# Patient Record
Sex: FEMALE | HISPANIC OR LATINO
[De-identification: names, ages, dates, MRNs, and addresses within clinical notes are randomized per-mention and may not be internally consistent; named-entity substitution may affect disease eponyms.]

---

## 2017-07-12 ENCOUNTER — APPOINTMENT (OUTPATIENT)
Dept: SPINE | Facility: CLINIC | Age: 22
End: 2017-07-12

## 2017-07-12 VITALS
HEIGHT: 60 IN | BODY MASS INDEX: 27.48 KG/M2 | SYSTOLIC BLOOD PRESSURE: 102 MMHG | DIASTOLIC BLOOD PRESSURE: 68 MMHG | WEIGHT: 140 LBS | HEART RATE: 54 BPM

## 2017-07-12 DIAGNOSIS — Z78.9 OTHER SPECIFIED HEALTH STATUS: ICD-10-CM

## 2017-07-12 DIAGNOSIS — Z82.69 FAMILY HISTORY OF OTHER DISEASES OF THE MUSCULOSKELETAL SYSTEM AND CONNECTIVE TISSUE: ICD-10-CM

## 2017-07-12 RX ORDER — ASPIRIN 81 MG
81 TABLET, DELAYED RELEASE (ENTERIC COATED) ORAL
Refills: 0 | Status: DISCONTINUED | COMMUNITY

## 2019-05-28 ENCOUNTER — LABORATORY RESULT (OUTPATIENT)
Age: 24
End: 2019-05-28

## 2019-05-28 ENCOUNTER — APPOINTMENT (OUTPATIENT)
Dept: NEUROLOGY | Facility: CLINIC | Age: 24
End: 2019-05-28
Payer: MEDICAID

## 2019-05-28 VITALS
SYSTOLIC BLOOD PRESSURE: 112 MMHG | HEART RATE: 92 BPM | DIASTOLIC BLOOD PRESSURE: 62 MMHG | HEIGHT: 60 IN | WEIGHT: 162 LBS | BODY MASS INDEX: 31.8 KG/M2 | TEMPERATURE: 98.4 F | OXYGEN SATURATION: 98 %

## 2019-05-28 DIAGNOSIS — Z78.9 OTHER SPECIFIED HEALTH STATUS: ICD-10-CM

## 2019-05-28 PROCEDURE — 99205 OFFICE O/P NEW HI 60 MIN: CPT

## 2019-05-28 NOTE — ASSESSMENT
[FreeTextEntry1] : Patient's symptoms of right eyelid twitching are likely related to anxiety.  However given patient's age, will evaluate for multiple sclerosis and epileptic events. Will obtain MRI of the brain with and without contrast as well as an EEG. Will also obtain thyroid functions tests.

## 2019-05-28 NOTE — REVIEW OF SYSTEMS
[As Noted in HPI] : as noted in HPI [Anxiety] : anxiety [Depression] : depression [Fever] : no fever [Eyesight Problems] : no eyesight problems [Loss Of Hearing] : no hearing loss [Chest Pain] : no chest pain [Shortness Of Breath] : no shortness of breath [Vomiting] : no vomiting [Incontinence] : no incontinence [Joint Pain] : no joint pain [Itching] : no itching [Muscle Weakness] : no muscle weakness [Easy Bleeding] : no tendency for easy bleeding

## 2019-05-28 NOTE — PHYSICAL EXAM
[General Appearance - Alert] : alert [General Appearance - In No Acute Distress] : in no acute distress [Person] : oriented to person [Place] : oriented to place [Time] : oriented to time [Registration Intact] : recent registration memory intact [Concentration Intact] : normal concentrating ability [Visual Intact] : visual attention was ~T not ~L decreased [Naming Objects] : no difficulty naming common objects [Repeating Phrases] : no difficulty repeating a phrase [Fluency] : fluency intact [Comprehension] : comprehension intact [Vocabulary] : adequate range of vocabulary [Cranial Nerves Optic (II)] : visual acuity intact bilaterally,  visual fields full to confrontation, pupils equal round and reactive to light [Cranial Nerves Oculomotor (III)] : extraocular motion intact [Cranial Nerves Facial (VII)] : face symmetrical [Cranial Nerves Trigeminal (V)] : facial sensation intact symmetrically [Cranial Nerves Vestibulocochlear (VIII)] : hearing was intact bilaterally [Cranial Nerves Glossopharyngeal (IX)] : tongue and palate midline [Cranial Nerves Accessory (XI - Cranial And Spinal)] : head turning and shoulder shrug symmetric [Cranial Nerves Hypoglossal (XII)] : there was no tongue deviation with protrusion [Motor Tone] : muscle tone was normal in all four extremities [Motor Strength] : muscle strength was normal in all four extremities [Involuntary Movements] : no involuntary movements were seen [Sensation Tactile Decrease] : light touch was intact [Abnormal Walk] : normal gait [Balance] : balance was intact [1+] : Ankle jerk left 1+ [Full Pulse] : the pedal pulses are present [Coordination - Dysmetria Impaired Finger-to-Nose Bilateral] : not present [Plantar Reflex Right Only] : normal on the right [Coordination - Dysmetria Impaired Heel-to-Shin Bilateral] : not present [Plantar Reflex Left Only] : normal on the left [FreeTextEntry5] : fundi sharp

## 2019-05-28 NOTE — DATA REVIEWED
[de-identified] : Neurosurgery consult appreciated\par MRI of the cervical spine shows C5-C6 disc bulge, inferior cerebellar tonsillar displacement measuring 1-2 mm suggestive of borderline tonsillar ectopia without evidence of syringohydromyelia

## 2019-05-28 NOTE — CONSULT LETTER
[Dear  ___] : Dear  [unfilled], [Consult Letter:] : I had the pleasure of evaluating your patient, [unfilled]. [Please see my note below.] : Please see my note below. [Sincerely,] : Sincerely, [Consult Closing:] : Thank you very much for allowing me to participate in the care of this patient.  If you have any questions, please do not hesitate to contact me. [FreeTextEntry3] : Patrica Baltazar MD, MPH\par

## 2019-05-28 NOTE — HISTORY OF PRESENT ILLNESS
[FreeTextEntry1] : Patient is here for evaluation of facial twitching started end of April-beginning of May 2019. It involve the right lower eyelid.  The twitching persisted for a few weeks, then resolve. The patient denies loss of vision or double vision. She had dizziness at that time. No vertigo. She did not having difficulty speaking or with language. She has occasional episodes of difficulty with swallowing, chronic. There was no focal weakness or numbness. The patient has just had difficulty with balance with falling towards the left side. The symptoms resolved. She had anxiety and depression at that time, which has improved.\par \par The patient has been born and lived in [place].  There is no family history of brain tumors, epilepsy or multiple sclerosis.

## 2019-05-30 ENCOUNTER — TRANSCRIPTION ENCOUNTER (OUTPATIENT)
Age: 24
End: 2019-05-30

## 2019-05-30 LAB
ANION GAP SERPL CALC-SCNC: 13 MMOL/L
BUN SERPL-MCNC: 15 MG/DL
CALCIUM SERPL-MCNC: 9.1 MG/DL
CHLORIDE SERPL-SCNC: 102 MMOL/L
CO2 SERPL-SCNC: 25 MMOL/L
CREAT SERPL-MCNC: 0.75 MG/DL
GLUCOSE SERPL-MCNC: 97 MG/DL
HCG UR QL: NEGATIVE
POTASSIUM SERPL-SCNC: 3.8 MMOL/L
SODIUM SERPL-SCNC: 140 MMOL/L
TSH SERPL-ACNC: 14.8 UIU/ML

## 2019-06-03 ENCOUNTER — APPOINTMENT (OUTPATIENT)
Dept: MRI IMAGING | Facility: HOSPITAL | Age: 24
End: 2019-06-03
Payer: MEDICAID

## 2019-06-03 ENCOUNTER — OUTPATIENT (OUTPATIENT)
Dept: OUTPATIENT SERVICES | Facility: HOSPITAL | Age: 24
LOS: 1 days | End: 2019-06-03
Payer: MEDICAID

## 2019-06-03 DIAGNOSIS — R25.3 FASCICULATION: ICD-10-CM

## 2019-06-03 PROCEDURE — 70553 MRI BRAIN STEM W/O & W/DYE: CPT

## 2019-06-03 PROCEDURE — 70553 MRI BRAIN STEM W/O & W/DYE: CPT | Mod: 26

## 2019-08-01 ENCOUNTER — APPOINTMENT (OUTPATIENT)
Dept: NEUROLOGY | Facility: CLINIC | Age: 24
End: 2019-08-01
Payer: MEDICAID

## 2019-08-01 ENCOUNTER — RESULT REVIEW (OUTPATIENT)
Age: 24
End: 2019-08-01

## 2019-08-01 VITALS
OXYGEN SATURATION: 98 % | BODY MASS INDEX: 31.8 KG/M2 | HEART RATE: 81 BPM | SYSTOLIC BLOOD PRESSURE: 108 MMHG | WEIGHT: 162 LBS | DIASTOLIC BLOOD PRESSURE: 68 MMHG | HEIGHT: 60 IN

## 2019-08-01 PROCEDURE — 99213 OFFICE O/P EST LOW 20 MIN: CPT

## 2019-08-01 NOTE — HISTORY OF PRESENT ILLNESS
[FreeTextEntry1] : Patient is here for evaluation of facial twitching started end of April-beginning of May 2019. It involve the right lower eyelid. The twitching persisted for a few weeks, then resolve. The patient denies loss of vision or double vision. She had dizziness at that time. No vertigo. She did not having difficulty speaking or with language. She has occasional episodes of difficulty with swallowing, chronic. There was no focal weakness or numbness. The patient has just had difficulty with balance with falling towards the left side. The symptoms resolved. She had anxiety and depression at that time, which has improved.\par \par The patient has been born and lived in [place]. There is no family history of brain tumors, epilepsy or multiple sclerosis. \par \par The twitching has resolved and has not reoccured.\par

## 2019-08-01 NOTE — PHYSICAL EXAM
[General Appearance - Alert] : alert [General Appearance - In No Acute Distress] : in no acute distress [Person] : oriented to person [Place] : oriented to place [Time] : oriented to time [Registration Intact] : recent registration memory intact [Concentration Intact] : normal concentrating ability [Visual Intact] : visual attention was ~T not ~L decreased [Naming Objects] : no difficulty naming common objects [Repeating Phrases] : no difficulty repeating a phrase [Fluency] : fluency intact [Comprehension] : comprehension intact [Vocabulary] : adequate range of vocabulary [Cranial Nerves Oculomotor (III)] : extraocular motion intact [Cranial Nerves Optic (II)] : visual acuity intact bilaterally,  visual fields full to confrontation, pupils equal round and reactive to light [Cranial Nerves Trigeminal (V)] : facial sensation intact symmetrically [Cranial Nerves Facial (VII)] : face symmetrical [Motor Tone] : muscle tone was normal in all four extremities [Sensation Tactile Decrease] : light touch was intact [Motor Strength] : muscle strength was normal in all four extremities [Abnormal Walk] : normal gait [Balance] : balance was intact

## 2019-08-01 NOTE — ASSESSMENT
[FreeTextEntry1] : Patient's symptoms of right eyelid twitching are likely related to anxiety. TSh is high, patient will fu with PMD/endo for that.  Will get EEG to evaluate for epileptic events. Patient will call after her eeg.

## 2019-08-02 ENCOUNTER — NON-APPOINTMENT (OUTPATIENT)
Age: 24
End: 2019-08-02

## 2019-08-02 ENCOUNTER — APPOINTMENT (OUTPATIENT)
Dept: OBGYN | Facility: CLINIC | Age: 24
End: 2019-08-02
Payer: MEDICAID

## 2019-08-02 ENCOUNTER — OUTPATIENT (OUTPATIENT)
Dept: OUTPATIENT SERVICES | Facility: HOSPITAL | Age: 24
LOS: 1 days | End: 2019-08-02
Payer: MEDICAID

## 2019-08-02 VITALS
WEIGHT: 163 LBS | DIASTOLIC BLOOD PRESSURE: 66 MMHG | BODY MASS INDEX: 32 KG/M2 | SYSTOLIC BLOOD PRESSURE: 92 MMHG | HEIGHT: 60 IN

## 2019-08-02 DIAGNOSIS — Z34.00 ENCOUNTER FOR SUPERVISION OF NORMAL FIRST PREGNANCY, UNSPECIFIED TRIMESTER: ICD-10-CM

## 2019-08-02 LAB
APPEARANCE UR: CLEAR — SIGNIFICANT CHANGE UP
BASOPHILS # BLD AUTO: 0.02 K/UL — SIGNIFICANT CHANGE UP (ref 0–0.2)
BASOPHILS NFR BLD AUTO: 0.4 % — SIGNIFICANT CHANGE UP (ref 0–2)
BILIRUB UR-MCNC: NEGATIVE — SIGNIFICANT CHANGE UP
COLOR SPEC: YELLOW — SIGNIFICANT CHANGE UP
DIFF PNL FLD: SIGNIFICANT CHANGE UP
EOSINOPHIL # BLD AUTO: 0.1 K/UL — SIGNIFICANT CHANGE UP (ref 0–0.5)
EOSINOPHIL NFR BLD AUTO: 1.8 % — SIGNIFICANT CHANGE UP (ref 0–6)
GLUCOSE UR QL: NEGATIVE — SIGNIFICANT CHANGE UP
HCT VFR BLD CALC: 36.2 % — SIGNIFICANT CHANGE UP (ref 34.5–45)
HGB BLD-MCNC: 11.1 G/DL — LOW (ref 11.5–15.5)
IMM GRANULOCYTES NFR BLD AUTO: 0.2 % — SIGNIFICANT CHANGE UP (ref 0–1.5)
KETONES UR-MCNC: NEGATIVE — SIGNIFICANT CHANGE UP
LEUKOCYTE ESTERASE UR-ACNC: NEGATIVE — SIGNIFICANT CHANGE UP
LYMPHOCYTES # BLD AUTO: 1.99 K/UL — SIGNIFICANT CHANGE UP (ref 1–3.3)
LYMPHOCYTES # BLD AUTO: 35.2 % — SIGNIFICANT CHANGE UP (ref 13–44)
MCHC RBC-ENTMCNC: 24.8 PG — LOW (ref 27–34)
MCHC RBC-ENTMCNC: 30.7 GM/DL — LOW (ref 32–36)
MCV RBC AUTO: 81 FL — SIGNIFICANT CHANGE UP (ref 80–100)
MONOCYTES # BLD AUTO: 0.32 K/UL — SIGNIFICANT CHANGE UP (ref 0–0.9)
MONOCYTES NFR BLD AUTO: 5.7 % — SIGNIFICANT CHANGE UP (ref 2–14)
NEUTROPHILS # BLD AUTO: 3.22 K/UL — SIGNIFICANT CHANGE UP (ref 1.8–7.4)
NEUTROPHILS NFR BLD AUTO: 56.7 % — SIGNIFICANT CHANGE UP (ref 43–77)
NITRITE UR-MCNC: NEGATIVE — SIGNIFICANT CHANGE UP
PH UR: 6 — SIGNIFICANT CHANGE UP (ref 5–8)
PLATELET # BLD AUTO: 396 K/UL — SIGNIFICANT CHANGE UP (ref 150–400)
PROT UR-MCNC: ABNORMAL
RBC # BLD: 4.47 M/UL — SIGNIFICANT CHANGE UP (ref 3.8–5.2)
RBC # FLD: 14.5 % — SIGNIFICANT CHANGE UP (ref 10.3–14.5)
SP GR SPEC: 1.03 — HIGH (ref 1.01–1.02)
T3FREE SERPL-MCNC: 3.09 PG/ML — SIGNIFICANT CHANGE UP (ref 1.8–4.6)
T4 FREE SERPL-MCNC: 1.1 NG/DL — SIGNIFICANT CHANGE UP (ref 0.9–1.8)
TSH SERPL-MCNC: 9.49 UIU/ML — HIGH (ref 0.27–4.2)
UROBILINOGEN FLD QL: SIGNIFICANT CHANGE UP
WBC # BLD: 5.66 K/UL — SIGNIFICANT CHANGE UP (ref 3.8–10.5)
WBC # FLD AUTO: 5.66 K/UL — SIGNIFICANT CHANGE UP (ref 3.8–10.5)

## 2019-08-02 PROCEDURE — G0463: CPT

## 2019-08-02 PROCEDURE — 87389 HIV-1 AG W/HIV-1&-2 AB AG IA: CPT

## 2019-08-02 PROCEDURE — 84439 ASSAY OF FREE THYROXINE: CPT

## 2019-08-02 PROCEDURE — 81025 URINE PREGNANCY TEST: CPT

## 2019-08-02 PROCEDURE — 36415 COLL VENOUS BLD VENIPUNCTURE: CPT

## 2019-08-02 PROCEDURE — 86901 BLOOD TYPING SEROLOGIC RH(D): CPT

## 2019-08-02 PROCEDURE — 86850 RBC ANTIBODY SCREEN: CPT

## 2019-08-02 PROCEDURE — 81003 URINALYSIS AUTO W/O SCOPE: CPT

## 2019-08-02 PROCEDURE — 84443 ASSAY THYROID STIM HORMONE: CPT

## 2019-08-02 PROCEDURE — 86765 RUBEOLA ANTIBODY: CPT

## 2019-08-02 PROCEDURE — 86480 TB TEST CELL IMMUN MEASURE: CPT

## 2019-08-02 PROCEDURE — 86803 HEPATITIS C AB TEST: CPT

## 2019-08-02 PROCEDURE — 83655 ASSAY OF LEAD: CPT

## 2019-08-02 PROCEDURE — 81220 CFTR GENE COM VARIANTS: CPT

## 2019-08-02 PROCEDURE — 87340 HEPATITIS B SURFACE AG IA: CPT

## 2019-08-02 PROCEDURE — 86780 TREPONEMA PALLIDUM: CPT

## 2019-08-02 PROCEDURE — 83036 HEMOGLOBIN GLYCOSYLATED A1C: CPT

## 2019-08-02 PROCEDURE — 81001 URINALYSIS AUTO W/SCOPE: CPT

## 2019-08-02 PROCEDURE — 83020 HEMOGLOBIN ELECTROPHORESIS: CPT | Mod: 26

## 2019-08-02 PROCEDURE — 86762 RUBELLA ANTIBODY: CPT

## 2019-08-02 PROCEDURE — 86900 BLOOD TYPING SEROLOGIC ABO: CPT

## 2019-08-02 PROCEDURE — 99203 OFFICE O/P NEW LOW 30 MIN: CPT | Mod: TH

## 2019-08-02 PROCEDURE — 86787 VARICELLA-ZOSTER ANTIBODY: CPT

## 2019-08-02 PROCEDURE — 87086 URINE CULTURE/COLONY COUNT: CPT

## 2019-08-02 PROCEDURE — 84481 FREE ASSAY (FT-3): CPT

## 2019-08-02 PROCEDURE — 86376 MICROSOMAL ANTIBODY EACH: CPT

## 2019-08-02 PROCEDURE — 83020 HEMOGLOBIN ELECTROPHORESIS: CPT

## 2019-08-03 LAB
HBA1C BLD-MCNC: 5.6 % — SIGNIFICANT CHANGE UP (ref 4–5.6)
HBV SURFACE AG SER-ACNC: SIGNIFICANT CHANGE UP
HCV AB S/CO SERPL IA: 0.12 S/CO — SIGNIFICANT CHANGE UP (ref 0–0.99)
HCV AB SERPL-IMP: SIGNIFICANT CHANGE UP
HIV 1+2 AB+HIV1 P24 AG SERPL QL IA: SIGNIFICANT CHANGE UP
MEV IGG SER-ACNC: >300 AU/ML — SIGNIFICANT CHANGE UP
MEV IGG+IGM SER-IMP: POSITIVE — SIGNIFICANT CHANGE UP
RUBV IGG SER-ACNC: 3.6 INDEX — SIGNIFICANT CHANGE UP
RUBV IGG SER-IMP: POSITIVE — SIGNIFICANT CHANGE UP
T PALLIDUM AB TITR SER: NEGATIVE — SIGNIFICANT CHANGE UP
THYROPEROXIDASE AB SERPL-ACNC: 640 IU/ML — HIGH (ref 0–34.9)
VZV IGG FLD QL IA: 1759 INDEX — SIGNIFICANT CHANGE UP
VZV IGG FLD QL IA: POSITIVE — SIGNIFICANT CHANGE UP

## 2019-08-04 LAB
CULTURE RESULTS: SIGNIFICANT CHANGE UP
SPECIMEN SOURCE: SIGNIFICANT CHANGE UP

## 2019-08-05 ENCOUNTER — RESULT CHARGE (OUTPATIENT)
Age: 24
End: 2019-08-05

## 2019-08-05 ENCOUNTER — OUTPATIENT (OUTPATIENT)
Dept: OUTPATIENT SERVICES | Facility: HOSPITAL | Age: 24
LOS: 1 days | End: 2019-08-05
Payer: MEDICAID

## 2019-08-05 ENCOUNTER — RESULT REVIEW (OUTPATIENT)
Age: 24
End: 2019-08-05

## 2019-08-05 DIAGNOSIS — Z34.91 ENCOUNTER FOR SUPERVISION OF NORMAL PREGNANCY, UNSPECIFIED, FIRST TRIMESTER: ICD-10-CM

## 2019-08-05 DIAGNOSIS — R25.3 FASCICULATION: ICD-10-CM

## 2019-08-05 DIAGNOSIS — Z3A.10 10 WEEKS GESTATION OF PREGNANCY: ICD-10-CM

## 2019-08-05 LAB
GAMMA INTERFERON BACKGROUND BLD IA-ACNC: 0.02 IU/ML — SIGNIFICANT CHANGE UP
LEAD BLD-MCNC: 1 UG/DL — SIGNIFICANT CHANGE UP (ref 0–4)
M TB IFN-G BLD-IMP: NEGATIVE — SIGNIFICANT CHANGE UP
M TB IFN-G CD4+ BCKGRND COR BLD-ACNC: 0 IU/ML — SIGNIFICANT CHANGE UP
M TB IFN-G CD4+CD8+ BCKGRND COR BLD-ACNC: 0.01 IU/ML — SIGNIFICANT CHANGE UP
QUANT TB PLUS MITOGEN MINUS NIL: 1.97 IU/ML — SIGNIFICANT CHANGE UP

## 2019-08-05 PROCEDURE — 95819 EEG AWAKE AND ASLEEP: CPT

## 2019-08-05 PROCEDURE — 95819 EEG AWAKE AND ASLEEP: CPT | Mod: 26

## 2019-08-05 PROCEDURE — 95957 EEG DIGITAL ANALYSIS: CPT

## 2019-08-05 NOTE — EEG REPORT - NS EEG TEXT BOX
[facility]  Comprehensive Epilepsy Center  Report of Routine EEG     [facility]: [address], [address] [place] [address], Phone: [phone]  [facility]: [address], [place] [address], Phone: [phone]  Office: [address] [facility], [facility] [address], [place] [address], Phone: [phone]    Patient Name: EDVIN JACOBSEN    Age: 24 year  : 1995  Patient ID: -, MRN #: [id number], Location: [facility] EEG Lab  Referring Physician: DR SHER  EEG #:     Study Date: 2019		    Technical Information:					  On Instrument: -  Placement and Labeling of Electrodes:  The EEG was performed utilizing 20 channels referential EEG connections (coronal over temporal over parasagittal montage) using all standard 10-20 electrode placements with EKG.  Recording was at a sampling rate of 256 samples per second per channel.  Harmeet and seizure detection algorithms were utilized.    History:  ROUTINE EEG PERFORMED   25 Y/O FEMALE  H/O : CHIRI ,FASCICULATION  P/W :  RIGHT EYE TWITCHING   PATIENT IS ALERT AND ORIENTED X 3  PATIENT IS RIGHT HANDED  PHOTIC STIMULATION PERFORMED  HYPERVENTILATION PERFORMED  HR 60-65  PDR 11        Medication	  No Data.	    Study Interpretation:    Findings: The background was continuous, spontaneously variable and reactive. During wakefulness, the posterior dominant rhythm consisted of symmetric, well-modulated 11 Hz activity, with amplitude to 30 uV, that attenuated to eye opening.  Low amplitude frontal beta was noted in wakefulness.    Background Slowing:  No generalized background slowing was present.    Focal Slowing:   None were present.    Sleep Background:  Drowsiness was characterized by fragmentation, attenuation, and slowing of the background activity.    Stage II sleep transients were not recorded.    Other Non-Epileptiform Findings:  None were present.    Interictal Epileptiform Activity:   None were present.    Events:  Clinical events: None recorded.  Seizures: None recorded.    Activation Procedures:   Hyperventilation was performed and did not elicit any abnormalities.    Photic stimulation was performed and did not elicit any abnormalities.      Artifacts:  Intermittent myogenic and movement artifacts were noted.    ECG:  The heart rate on single channel ECG was predominantly between 60 and 70 BPM.    EEG Summary/Classification:  Normal EEG in the awake and drowsy states.    EEG Impression/Clinical Correlate:    Normal EEG study.    No epileptic pattern or seizure seen.    No clinical events concerning for seizure were captured during this study.    A repeat study preceded by sleep deprivation may be considered to help capture the asleep state.      ________________________________________    Jesus Balderas MD  Attending Physician, [facility] [place]

## 2019-08-06 LAB
HEMOGLOBIN INTERPRETATION: SIGNIFICANT CHANGE UP
HGB A MFR BLD: 97.5 % — SIGNIFICANT CHANGE UP (ref 95.8–98)
HGB A2 MFR BLD: 2.5 % — SIGNIFICANT CHANGE UP (ref 2–3.2)

## 2019-08-09 LAB — CYSTIC FIBROSIS EXPANDED PANEL: SIGNIFICANT CHANGE UP

## 2019-08-19 ENCOUNTER — APPOINTMENT (OUTPATIENT)
Dept: ANTEPARTUM | Facility: CLINIC | Age: 24
End: 2019-08-19
Payer: MEDICAID

## 2019-08-19 ENCOUNTER — ASOB RESULT (OUTPATIENT)
Age: 24
End: 2019-08-19

## 2019-08-19 PROCEDURE — 76801 OB US < 14 WKS SINGLE FETUS: CPT

## 2019-08-19 PROCEDURE — 36416 COLLJ CAPILLARY BLOOD SPEC: CPT

## 2019-08-19 PROCEDURE — 76813 OB US NUCHAL MEAS 1 GEST: CPT

## 2019-09-06 ENCOUNTER — OUTPATIENT (OUTPATIENT)
Dept: OUTPATIENT SERVICES | Facility: HOSPITAL | Age: 24
LOS: 1 days | End: 2019-09-06
Payer: MEDICAID

## 2019-09-06 ENCOUNTER — APPOINTMENT (OUTPATIENT)
Dept: OBGYN | Facility: CLINIC | Age: 24
End: 2019-09-06
Payer: MEDICAID

## 2019-09-06 ENCOUNTER — NON-APPOINTMENT (OUTPATIENT)
Age: 24
End: 2019-09-06

## 2019-09-06 VITALS
BODY MASS INDEX: 31.41 KG/M2 | WEIGHT: 160 LBS | HEIGHT: 60 IN | DIASTOLIC BLOOD PRESSURE: 65 MMHG | SYSTOLIC BLOOD PRESSURE: 97 MMHG

## 2019-09-06 DIAGNOSIS — Z34.00 ENCOUNTER FOR SUPERVISION OF NORMAL FIRST PREGNANCY, UNSPECIFIED TRIMESTER: ICD-10-CM

## 2019-09-06 LAB
T4 FREE SERPL-MCNC: 1.2 NG/DL — SIGNIFICANT CHANGE UP (ref 0.9–1.8)
TSH SERPL-MCNC: 5.91 UIU/ML — HIGH (ref 0.27–4.2)

## 2019-09-06 PROCEDURE — 84439 ASSAY OF FREE THYROXINE: CPT

## 2019-09-06 PROCEDURE — G0463: CPT

## 2019-09-06 PROCEDURE — 81003 URINALYSIS AUTO W/O SCOPE: CPT

## 2019-09-06 PROCEDURE — 99213 OFFICE O/P EST LOW 20 MIN: CPT | Mod: TH

## 2019-09-06 PROCEDURE — 84443 ASSAY THYROID STIM HORMONE: CPT

## 2019-09-09 DIAGNOSIS — Z3A.15 15 WEEKS GESTATION OF PREGNANCY: ICD-10-CM

## 2019-09-09 DIAGNOSIS — O99.280 ENDOCRINE, NUTRITIONAL AND METABOLIC DISEASES COMPLICATING PREGNANCY, UNSPECIFIED TRIMESTER: ICD-10-CM

## 2019-09-09 DIAGNOSIS — Z34.91 ENCOUNTER FOR SUPERVISION OF NORMAL PREGNANCY, UNSPECIFIED, FIRST TRIMESTER: ICD-10-CM

## 2019-09-25 ENCOUNTER — APPOINTMENT (OUTPATIENT)
Dept: NEUROLOGY | Facility: CLINIC | Age: 24
End: 2019-09-25
Payer: MEDICAID

## 2019-09-25 ENCOUNTER — OUTPATIENT (OUTPATIENT)
Dept: OUTPATIENT SERVICES | Facility: HOSPITAL | Age: 24
LOS: 1 days | End: 2019-09-25
Payer: MEDICAID

## 2019-09-25 VITALS
DIASTOLIC BLOOD PRESSURE: 69 MMHG | SYSTOLIC BLOOD PRESSURE: 104 MMHG | BODY MASS INDEX: 31.41 KG/M2 | HEIGHT: 60 IN | WEIGHT: 160 LBS | OXYGEN SATURATION: 98 % | HEART RATE: 69 BPM

## 2019-09-25 DIAGNOSIS — Z34.90 ENCOUNTER FOR SUPERVISION OF NORMAL PREGNANCY, UNSPECIFIED, UNSPECIFIED TRIMESTER: ICD-10-CM

## 2019-09-25 PROCEDURE — 82105 ALPHA-FETOPROTEIN SERUM: CPT

## 2019-09-25 PROCEDURE — 99212 OFFICE O/P EST SF 10 MIN: CPT

## 2019-09-25 PROCEDURE — 84704 HCG FREE BETACHAIN TEST: CPT

## 2019-09-25 PROCEDURE — 84702 CHORIONIC GONADOTROPIN TEST: CPT

## 2019-09-25 PROCEDURE — 86336 INHIBIN A: CPT

## 2019-09-25 PROCEDURE — 82677 ASSAY OF ESTRIOL: CPT

## 2019-09-25 RX ORDER — LEVOTHYROXINE SODIUM 0.05 MG/1
50 TABLET ORAL DAILY
Qty: 30 | Refills: 1 | Status: DISCONTINUED | COMMUNITY
Start: 2019-08-06 | End: 2019-09-25

## 2019-09-25 NOTE — ASSESSMENT
[FreeTextEntry1] : Patient's symptoms of right eyelid twitching are likely related to anxiety. TSh is high, synthroid was just increased.  fu prn

## 2019-09-25 NOTE — HISTORY OF PRESENT ILLNESS
[FreeTextEntry1] : Patient is here for evaluation of facial twitching started end of April-beginning of May 2019. It involve the right lower eyelid. The twitching persisted for a few weeks, then resolve. The patient denies loss of vision or double vision. She had dizziness at that time. No vertigo. She did not having difficulty speaking or with language. She has occasional episodes of difficulty with swallowing, chronic. There was no focal weakness or numbness. The patient has just had difficulty with balance with falling towards the left side. The symptoms resolved. She had anxiety and depression at that time, which has improved.  The dose of Synthroid was increased a couple of weeks ago. \par \par The patient has been born and lived in [place]. There is no family history of brain tumors, epilepsy or multiple sclerosis. \par \par The twitching has resolved and has not reoccured.

## 2019-09-25 NOTE — PHYSICAL EXAM
[General Appearance - Alert] : alert [General Appearance - In No Acute Distress] : in no acute distress [Person] : oriented to person [Place] : oriented to place [Time] : oriented to time [Registration Intact] : recent registration memory intact [Concentration Intact] : normal concentrating ability [Visual Intact] : visual attention was ~T not ~L decreased [Naming Objects] : no difficulty naming common objects [Repeating Phrases] : no difficulty repeating a phrase [Fluency] : fluency intact [Comprehension] : comprehension intact [Cranial Nerves Optic (II)] : visual acuity intact bilaterally,  visual fields full to confrontation, pupils equal round and reactive to light [Vocabulary] : adequate range of vocabulary [Cranial Nerves Trigeminal (V)] : facial sensation intact symmetrically [Cranial Nerves Oculomotor (III)] : extraocular motion intact [Cranial Nerves Facial (VII)] : face symmetrical [Motor Tone] : muscle tone was normal in all four extremities [Motor Strength] : muscle strength was normal in all four extremities [Sensation Tactile Decrease] : light touch was intact [Abnormal Walk] : normal gait [Balance] : balance was intact

## 2019-09-28 LAB
1ST TRIMESTER DATA: SIGNIFICANT CHANGE UP
2ND TRIMESTER DATA: SIGNIFICANT CHANGE UP
AFP AMN-MCNC: SIGNIFICANT CHANGE UP
AFP SERPL-ACNC: SIGNIFICANT CHANGE UP
B-HCG FREE SERPL-MCNC: SIGNIFICANT CHANGE UP
B-HCG FREE SERPL-MCNC: SIGNIFICANT CHANGE UP
CLINICAL BIOCHEMIST REVIEW: SIGNIFICANT CHANGE UP
DEMOGRAPHIC DATA: SIGNIFICANT CHANGE UP
INHIBIN A SERPL-MCNC: SIGNIFICANT CHANGE UP
NT: SIGNIFICANT CHANGE UP
PAPP-A SERPL-ACNC: SIGNIFICANT CHANGE UP
SCREEN-FOOTER: SIGNIFICANT CHANGE UP
U ESTRIOL SERPL-SCNC: SIGNIFICANT CHANGE UP

## 2019-10-10 ENCOUNTER — OUTPATIENT (OUTPATIENT)
Dept: OUTPATIENT SERVICES | Facility: HOSPITAL | Age: 24
LOS: 1 days | End: 2019-10-10
Payer: MEDICAID

## 2019-10-10 ENCOUNTER — APPOINTMENT (OUTPATIENT)
Dept: OBGYN | Facility: CLINIC | Age: 24
End: 2019-10-10
Payer: MEDICAID

## 2019-10-10 ENCOUNTER — NON-APPOINTMENT (OUTPATIENT)
Age: 24
End: 2019-10-10

## 2019-10-10 VITALS
SYSTOLIC BLOOD PRESSURE: 98 MMHG | DIASTOLIC BLOOD PRESSURE: 63 MMHG | HEIGHT: 60 IN | WEIGHT: 159 LBS | BODY MASS INDEX: 31.22 KG/M2

## 2019-10-10 DIAGNOSIS — Z34.00 ENCOUNTER FOR SUPERVISION OF NORMAL FIRST PREGNANCY, UNSPECIFIED TRIMESTER: ICD-10-CM

## 2019-10-10 PROCEDURE — 81003 URINALYSIS AUTO W/O SCOPE: CPT

## 2019-10-10 PROCEDURE — 99213 OFFICE O/P EST LOW 20 MIN: CPT | Mod: TH

## 2019-10-10 PROCEDURE — 85027 COMPLETE CBC AUTOMATED: CPT

## 2019-10-10 PROCEDURE — 84443 ASSAY THYROID STIM HORMONE: CPT

## 2019-10-10 PROCEDURE — 84439 ASSAY OF FREE THYROXINE: CPT

## 2019-10-10 PROCEDURE — G0463: CPT

## 2019-10-10 PROCEDURE — 84480 ASSAY TRIIODOTHYRONINE (T3): CPT

## 2019-10-10 PROCEDURE — 84479 ASSAY OF THYROID (T3 OR T4): CPT

## 2019-10-10 PROCEDURE — 84436 ASSAY OF TOTAL THYROXINE: CPT

## 2019-10-11 DIAGNOSIS — Z3A.10 10 WEEKS GESTATION OF PREGNANCY: ICD-10-CM

## 2019-10-11 DIAGNOSIS — O99.280 ENDOCRINE, NUTRITIONAL AND METABOLIC DISEASES COMPLICATING PREGNANCY, UNSPECIFIED TRIMESTER: ICD-10-CM

## 2019-10-11 LAB
BASOPHILS # BLD AUTO: 0.03 K/UL — SIGNIFICANT CHANGE UP (ref 0–0.2)
BASOPHILS NFR BLD AUTO: 0.4 % — SIGNIFICANT CHANGE UP (ref 0–2)
EOSINOPHIL # BLD AUTO: 0.14 K/UL — SIGNIFICANT CHANGE UP (ref 0–0.5)
EOSINOPHIL NFR BLD AUTO: 1.7 % — SIGNIFICANT CHANGE UP (ref 0–6)
HCT VFR BLD CALC: 29.7 % — LOW (ref 34.5–45)
HGB BLD-MCNC: 9.2 G/DL — LOW (ref 11.5–15.5)
IMM GRANULOCYTES NFR BLD AUTO: 0.4 % — SIGNIFICANT CHANGE UP (ref 0–1.5)
LYMPHOCYTES # BLD AUTO: 1.81 K/UL — SIGNIFICANT CHANGE UP (ref 1–3.3)
LYMPHOCYTES # BLD AUTO: 21.7 % — SIGNIFICANT CHANGE UP (ref 13–44)
MCHC RBC-ENTMCNC: 25.7 PG — LOW (ref 27–34)
MCHC RBC-ENTMCNC: 31 GM/DL — LOW (ref 32–36)
MCV RBC AUTO: 83 FL — SIGNIFICANT CHANGE UP (ref 80–100)
MONOCYTES # BLD AUTO: 0.47 K/UL — SIGNIFICANT CHANGE UP (ref 0–0.9)
MONOCYTES NFR BLD AUTO: 5.6 % — SIGNIFICANT CHANGE UP (ref 2–14)
NEUTROPHILS # BLD AUTO: 5.86 K/UL — SIGNIFICANT CHANGE UP (ref 1.8–7.4)
NEUTROPHILS NFR BLD AUTO: 70.2 % — SIGNIFICANT CHANGE UP (ref 43–77)
PLATELET # BLD AUTO: 327 K/UL — SIGNIFICANT CHANGE UP (ref 150–400)
RBC # BLD: 3.58 M/UL — LOW (ref 3.8–5.2)
RBC # FLD: 15.3 % — HIGH (ref 10.3–14.5)
T3 SERPL-MCNC: 192 NG/DL — SIGNIFICANT CHANGE UP (ref 80–200)
T4 FREE SERPL-MCNC: 1 NG/DL — SIGNIFICANT CHANGE UP (ref 0.9–1.8)
T4/T3 UPTAKE INDEX SERPL: 1.3 TBI — SIGNIFICANT CHANGE UP (ref 0.8–1.3)
TSH SERPL-MCNC: 5.97 UIU/ML — HIGH (ref 0.27–4.2)
WBC # BLD: 8.34 K/UL — SIGNIFICANT CHANGE UP (ref 3.8–10.5)
WBC # FLD AUTO: 8.34 K/UL — SIGNIFICANT CHANGE UP (ref 3.8–10.5)

## 2019-10-14 ENCOUNTER — APPOINTMENT (OUTPATIENT)
Dept: ANTEPARTUM | Facility: CLINIC | Age: 24
End: 2019-10-14
Payer: MEDICAID

## 2019-10-14 ENCOUNTER — ASOB RESULT (OUTPATIENT)
Age: 24
End: 2019-10-14

## 2019-10-14 PROCEDURE — 99203 OFFICE O/P NEW LOW 30 MIN: CPT | Mod: 25,TH

## 2019-10-18 ENCOUNTER — MED ADMIN CHARGE (OUTPATIENT)
Age: 24
End: 2019-10-18

## 2019-11-07 ENCOUNTER — APPOINTMENT (OUTPATIENT)
Dept: ENDOCRINOLOGY | Facility: CLINIC | Age: 24
End: 2019-11-07

## 2019-11-14 ENCOUNTER — OUTPATIENT (OUTPATIENT)
Dept: OUTPATIENT SERVICES | Facility: HOSPITAL | Age: 24
LOS: 1 days | End: 2019-11-14
Payer: MEDICAID

## 2019-11-14 ENCOUNTER — NON-APPOINTMENT (OUTPATIENT)
Age: 24
End: 2019-11-14

## 2019-11-14 ENCOUNTER — APPOINTMENT (OUTPATIENT)
Dept: OBGYN | Facility: CLINIC | Age: 24
End: 2019-11-14
Payer: MEDICAID

## 2019-11-14 VITALS
HEIGHT: 60 IN | DIASTOLIC BLOOD PRESSURE: 61 MMHG | SYSTOLIC BLOOD PRESSURE: 97 MMHG | BODY MASS INDEX: 32.39 KG/M2 | WEIGHT: 165 LBS

## 2019-11-14 DIAGNOSIS — Z3A.10 10 WEEKS GESTATION OF PREGNANCY: ICD-10-CM

## 2019-11-14 DIAGNOSIS — Z34.00 ENCOUNTER FOR SUPERVISION OF NORMAL FIRST PREGNANCY, UNSPECIFIED TRIMESTER: ICD-10-CM

## 2019-11-14 LAB
ALBUMIN SERPL ELPH-MCNC: 3.3 G/DL — SIGNIFICANT CHANGE UP (ref 3.3–5)
ALP SERPL-CCNC: 98 U/L — SIGNIFICANT CHANGE UP (ref 40–120)
ALT FLD-CCNC: 5 U/L — LOW (ref 10–45)
ANION GAP SERPL CALC-SCNC: 11 MMOL/L — SIGNIFICANT CHANGE UP (ref 5–17)
APPEARANCE UR: CLEAR — SIGNIFICANT CHANGE UP
AST SERPL-CCNC: 13 U/L — SIGNIFICANT CHANGE UP (ref 10–40)
BASOPHILS # BLD AUTO: 0.02 K/UL — SIGNIFICANT CHANGE UP (ref 0–0.2)
BASOPHILS NFR BLD AUTO: 0.2 % — SIGNIFICANT CHANGE UP (ref 0–2)
BILIRUB SERPL-MCNC: 0.2 MG/DL — SIGNIFICANT CHANGE UP (ref 0.2–1.2)
BILIRUB UR-MCNC: NEGATIVE — SIGNIFICANT CHANGE UP
BUN SERPL-MCNC: 7 MG/DL — SIGNIFICANT CHANGE UP (ref 7–23)
CALCIUM SERPL-MCNC: 8.6 MG/DL — SIGNIFICANT CHANGE UP (ref 8.4–10.5)
CHLORIDE SERPL-SCNC: 104 MMOL/L — SIGNIFICANT CHANGE UP (ref 96–108)
CO2 SERPL-SCNC: 22 MMOL/L — SIGNIFICANT CHANGE UP (ref 22–31)
COLOR SPEC: COLORLESS — SIGNIFICANT CHANGE UP
CREAT SERPL-MCNC: 0.56 MG/DL — SIGNIFICANT CHANGE UP (ref 0.5–1.3)
DIFF PNL FLD: NEGATIVE — SIGNIFICANT CHANGE UP
EOSINOPHIL # BLD AUTO: 0.19 K/UL — SIGNIFICANT CHANGE UP (ref 0–0.5)
EOSINOPHIL NFR BLD AUTO: 2.2 % — SIGNIFICANT CHANGE UP (ref 0–6)
FERRITIN SERPL-MCNC: 10 NG/ML — LOW (ref 15–150)
GLUCOSE SERPL-MCNC: 109 MG/DL — HIGH (ref 70–99)
GLUCOSE UR QL: ABNORMAL
HCT VFR BLD CALC: 28 % — LOW (ref 34.5–45)
HGB BLD-MCNC: 8.7 G/DL — LOW (ref 11.5–15.5)
IMM GRANULOCYTES NFR BLD AUTO: 0.3 % — SIGNIFICANT CHANGE UP (ref 0–1.5)
IRON SATN MFR SERPL: 33 UG/DL — SIGNIFICANT CHANGE UP (ref 30–160)
IRON SATN MFR SERPL: 6 % — LOW (ref 14–50)
KETONES UR-MCNC: NEGATIVE — SIGNIFICANT CHANGE UP
LEUKOCYTE ESTERASE UR-ACNC: NEGATIVE — SIGNIFICANT CHANGE UP
LYMPHOCYTES # BLD AUTO: 1.65 K/UL — SIGNIFICANT CHANGE UP (ref 1–3.3)
LYMPHOCYTES # BLD AUTO: 18.7 % — SIGNIFICANT CHANGE UP (ref 13–44)
MCHC RBC-ENTMCNC: 25 PG — LOW (ref 27–34)
MCHC RBC-ENTMCNC: 31.1 GM/DL — LOW (ref 32–36)
MCV RBC AUTO: 80.5 FL — SIGNIFICANT CHANGE UP (ref 80–100)
MONOCYTES # BLD AUTO: 0.47 K/UL — SIGNIFICANT CHANGE UP (ref 0–0.9)
MONOCYTES NFR BLD AUTO: 5.3 % — SIGNIFICANT CHANGE UP (ref 2–14)
NEUTROPHILS # BLD AUTO: 6.46 K/UL — SIGNIFICANT CHANGE UP (ref 1.8–7.4)
NEUTROPHILS NFR BLD AUTO: 73.3 % — SIGNIFICANT CHANGE UP (ref 43–77)
NITRITE UR-MCNC: NEGATIVE — SIGNIFICANT CHANGE UP
PH UR: 6.5 — SIGNIFICANT CHANGE UP (ref 5–8)
PLATELET # BLD AUTO: 347 K/UL — SIGNIFICANT CHANGE UP (ref 150–400)
POTASSIUM SERPL-MCNC: 3.8 MMOL/L — SIGNIFICANT CHANGE UP (ref 3.5–5.3)
POTASSIUM SERPL-SCNC: 3.8 MMOL/L — SIGNIFICANT CHANGE UP (ref 3.5–5.3)
PROT SERPL-MCNC: 6.9 G/DL — SIGNIFICANT CHANGE UP (ref 6–8.3)
PROT UR-MCNC: NEGATIVE — SIGNIFICANT CHANGE UP
RBC # BLD: 3.48 M/UL — LOW (ref 3.8–5.2)
RBC # FLD: 14.6 % — HIGH (ref 10.3–14.5)
SODIUM SERPL-SCNC: 137 MMOL/L — SIGNIFICANT CHANGE UP (ref 135–145)
SP GR SPEC: 1.01 — LOW (ref 1.01–1.02)
T PALLIDUM AB TITR SER: NEGATIVE — SIGNIFICANT CHANGE UP
T4 FREE SERPL-MCNC: 0.9 NG/DL — SIGNIFICANT CHANGE UP (ref 0.9–1.8)
TIBC SERPL-MCNC: 522 UG/DL — HIGH (ref 220–430)
TSH SERPL-MCNC: 8.49 UIU/ML — HIGH (ref 0.27–4.2)
UIBC SERPL-MCNC: 489 UG/DL — HIGH (ref 110–370)
UROBILINOGEN FLD QL: SIGNIFICANT CHANGE UP
WBC # BLD: 8.82 K/UL — SIGNIFICANT CHANGE UP (ref 3.8–10.5)
WBC # FLD AUTO: 8.82 K/UL — SIGNIFICANT CHANGE UP (ref 3.8–10.5)

## 2019-11-14 PROCEDURE — 82728 ASSAY OF FERRITIN: CPT

## 2019-11-14 PROCEDURE — 80053 COMPREHEN METABOLIC PANEL: CPT

## 2019-11-14 PROCEDURE — 99213 OFFICE O/P EST LOW 20 MIN: CPT | Mod: TH

## 2019-11-14 PROCEDURE — 86780 TREPONEMA PALLIDUM: CPT

## 2019-11-14 PROCEDURE — 84443 ASSAY THYROID STIM HORMONE: CPT

## 2019-11-14 PROCEDURE — G0463: CPT

## 2019-11-14 PROCEDURE — 85027 COMPLETE CBC AUTOMATED: CPT

## 2019-11-14 PROCEDURE — 36415 COLL VENOUS BLD VENIPUNCTURE: CPT

## 2019-11-14 PROCEDURE — 87086 URINE CULTURE/COLONY COUNT: CPT

## 2019-11-14 PROCEDURE — 83550 IRON BINDING TEST: CPT

## 2019-11-14 PROCEDURE — 87186 SC STD MICRODIL/AGAR DIL: CPT

## 2019-11-14 PROCEDURE — 82950 GLUCOSE TEST: CPT

## 2019-11-14 PROCEDURE — 83540 ASSAY OF IRON: CPT

## 2019-11-14 PROCEDURE — 81003 URINALYSIS AUTO W/O SCOPE: CPT

## 2019-11-14 PROCEDURE — 84439 ASSAY OF FREE THYROXINE: CPT

## 2019-11-15 LAB — GLUCOSE 1H P GLC SERPL-MCNC: 103 MG/DL — SIGNIFICANT CHANGE UP (ref 70–199)

## 2019-11-18 ENCOUNTER — RESULT REVIEW (OUTPATIENT)
Age: 24
End: 2019-11-18

## 2019-12-04 ENCOUNTER — OUTPATIENT (OUTPATIENT)
Dept: OUTPATIENT SERVICES | Facility: HOSPITAL | Age: 24
LOS: 1 days | End: 2019-12-04
Payer: MEDICAID

## 2019-12-04 ENCOUNTER — NON-APPOINTMENT (OUTPATIENT)
Age: 24
End: 2019-12-04

## 2019-12-04 ENCOUNTER — APPOINTMENT (OUTPATIENT)
Dept: OBGYN | Facility: CLINIC | Age: 24
End: 2019-12-04
Payer: MEDICAID

## 2019-12-04 VITALS
HEIGHT: 60 IN | DIASTOLIC BLOOD PRESSURE: 64 MMHG | WEIGHT: 164 LBS | SYSTOLIC BLOOD PRESSURE: 99 MMHG | BODY MASS INDEX: 32.2 KG/M2

## 2019-12-04 DIAGNOSIS — Z3A.10 10 WEEKS GESTATION OF PREGNANCY: ICD-10-CM

## 2019-12-04 DIAGNOSIS — Z34.00 ENCOUNTER FOR SUPERVISION OF NORMAL FIRST PREGNANCY, UNSPECIFIED TRIMESTER: ICD-10-CM

## 2019-12-04 PROCEDURE — 81003 URINALYSIS AUTO W/O SCOPE: CPT

## 2019-12-04 PROCEDURE — G0463: CPT

## 2019-12-04 PROCEDURE — 87086 URINE CULTURE/COLONY COUNT: CPT

## 2019-12-04 PROCEDURE — 99213 OFFICE O/P EST LOW 20 MIN: CPT | Mod: NC,TH

## 2019-12-04 PROCEDURE — 81001 URINALYSIS AUTO W/SCOPE: CPT

## 2019-12-04 PROCEDURE — 87186 SC STD MICRODIL/AGAR DIL: CPT

## 2019-12-05 LAB
APPEARANCE UR: ABNORMAL
BACTERIA # UR AUTO: NEGATIVE — SIGNIFICANT CHANGE UP
BILIRUB UR-MCNC: NEGATIVE — SIGNIFICANT CHANGE UP
COLOR SPEC: SIGNIFICANT CHANGE UP
COMMENT - URINE: SIGNIFICANT CHANGE UP
DIFF PNL FLD: SIGNIFICANT CHANGE UP
EPI CELLS # UR: 4 /HPF — SIGNIFICANT CHANGE UP (ref 0–5)
GLUCOSE UR QL: ABNORMAL
HYALINE CASTS # UR AUTO: 0 /LPF — SIGNIFICANT CHANGE UP (ref 0–7)
KETONES UR-MCNC: NEGATIVE — SIGNIFICANT CHANGE UP
LEUKOCYTE ESTERASE UR-ACNC: NEGATIVE — SIGNIFICANT CHANGE UP
NITRITE UR-MCNC: NEGATIVE — SIGNIFICANT CHANGE UP
PH UR: 6 — SIGNIFICANT CHANGE UP (ref 5–8)
PROT UR-MCNC: SIGNIFICANT CHANGE UP
RBC CASTS # UR COMP ASSIST: 5 /HPF — HIGH (ref 0–4)
SP GR SPEC: 1.02 — SIGNIFICANT CHANGE UP (ref 1.01–1.02)
UROBILINOGEN FLD QL: SIGNIFICANT CHANGE UP
WBC UR QL: 3 /HPF — SIGNIFICANT CHANGE UP (ref 0–5)

## 2019-12-07 LAB
-  AMIKACIN: SIGNIFICANT CHANGE UP
-  AMPICILLIN/SULBACTAM: SIGNIFICANT CHANGE UP
-  AMPICILLIN: SIGNIFICANT CHANGE UP
-  AZTREONAM: SIGNIFICANT CHANGE UP
-  CEFAZOLIN: SIGNIFICANT CHANGE UP
-  CEFEPIME: SIGNIFICANT CHANGE UP
-  CEFOXITIN: SIGNIFICANT CHANGE UP
-  CEFTRIAXONE: SIGNIFICANT CHANGE UP
-  CIPROFLOXACIN: SIGNIFICANT CHANGE UP
-  GENTAMICIN: SIGNIFICANT CHANGE UP
-  IMIPENEM: SIGNIFICANT CHANGE UP
-  LEVOFLOXACIN: SIGNIFICANT CHANGE UP
-  MEROPENEM: SIGNIFICANT CHANGE UP
-  NITROFURANTOIN: SIGNIFICANT CHANGE UP
-  PIPERACILLIN/TAZOBACTAM: SIGNIFICANT CHANGE UP
-  TIGECYCLINE: SIGNIFICANT CHANGE UP
-  TOBRAMYCIN: SIGNIFICANT CHANGE UP
-  TRIMETHOPRIM/SULFAMETHOXAZOLE: SIGNIFICANT CHANGE UP
METHOD TYPE: SIGNIFICANT CHANGE UP

## 2019-12-08 LAB
-  AMPICILLIN/SULBACTAM: SIGNIFICANT CHANGE UP
-  CEFAZOLIN: SIGNIFICANT CHANGE UP
-  GENTAMICIN: SIGNIFICANT CHANGE UP
-  OXACILLIN: SIGNIFICANT CHANGE UP
-  PENICILLIN: SIGNIFICANT CHANGE UP
-  RIFAMPIN: SIGNIFICANT CHANGE UP
-  TETRACYCLINE: SIGNIFICANT CHANGE UP
-  TRIMETHOPRIM/SULFAMETHOXAZOLE: SIGNIFICANT CHANGE UP
-  VANCOMYCIN: SIGNIFICANT CHANGE UP
CULTURE RESULTS: SIGNIFICANT CHANGE UP
METHOD TYPE: SIGNIFICANT CHANGE UP
ORGANISM # SPEC MICROSCOPIC CNT: SIGNIFICANT CHANGE UP
SPECIMEN SOURCE: SIGNIFICANT CHANGE UP

## 2019-12-09 DIAGNOSIS — G93.5 COMPRESSION OF BRAIN: ICD-10-CM

## 2019-12-09 DIAGNOSIS — Z34.03 ENCOUNTER FOR SUPERVISION OF NORMAL FIRST PREGNANCY, THIRD TRIMESTER: ICD-10-CM

## 2019-12-09 DIAGNOSIS — Z3A.27 27 WEEKS GESTATION OF PREGNANCY: ICD-10-CM

## 2019-12-09 DIAGNOSIS — M50.20 OTHER CERVICAL DISC DISPLACEMENT, UNSPECIFIED CERVICAL REGION: ICD-10-CM

## 2019-12-10 ENCOUNTER — RESULT REVIEW (OUTPATIENT)
Age: 24
End: 2019-12-10

## 2019-12-10 RX ORDER — CEFDINIR 300 MG/1
300 CAPSULE ORAL
Qty: 14 | Refills: 0 | Status: COMPLETED | COMMUNITY
Start: 2019-11-18 | End: 2019-12-10

## 2019-12-17 ENCOUNTER — ASOB RESULT (OUTPATIENT)
Age: 24
End: 2019-12-17

## 2019-12-17 ENCOUNTER — APPOINTMENT (OUTPATIENT)
Dept: ANTEPARTUM | Facility: CLINIC | Age: 24
End: 2019-12-17
Payer: MEDICAID

## 2019-12-17 PROCEDURE — 76816 OB US FOLLOW-UP PER FETUS: CPT

## 2019-12-26 ENCOUNTER — NON-APPOINTMENT (OUTPATIENT)
Age: 24
End: 2019-12-26

## 2019-12-26 ENCOUNTER — OUTPATIENT (OUTPATIENT)
Dept: OUTPATIENT SERVICES | Facility: HOSPITAL | Age: 24
LOS: 1 days | End: 2019-12-26
Payer: MEDICAID

## 2019-12-26 ENCOUNTER — APPOINTMENT (OUTPATIENT)
Dept: OBGYN | Facility: CLINIC | Age: 24
End: 2019-12-26
Payer: MEDICAID

## 2019-12-26 VITALS
SYSTOLIC BLOOD PRESSURE: 98 MMHG | HEIGHT: 60 IN | WEIGHT: 166.75 LBS | DIASTOLIC BLOOD PRESSURE: 64 MMHG | BODY MASS INDEX: 32.74 KG/M2

## 2019-12-26 DIAGNOSIS — Z34.00 ENCOUNTER FOR SUPERVISION OF NORMAL FIRST PREGNANCY, UNSPECIFIED TRIMESTER: ICD-10-CM

## 2019-12-26 LAB
APPEARANCE UR: ABNORMAL
BILIRUB UR-MCNC: NEGATIVE — SIGNIFICANT CHANGE UP
COLOR SPEC: YELLOW — SIGNIFICANT CHANGE UP
DIFF PNL FLD: SIGNIFICANT CHANGE UP
GLUCOSE UR QL: ABNORMAL
KETONES UR-MCNC: NEGATIVE — SIGNIFICANT CHANGE UP
LEUKOCYTE ESTERASE UR-ACNC: NEGATIVE — SIGNIFICANT CHANGE UP
NITRITE UR-MCNC: NEGATIVE — SIGNIFICANT CHANGE UP
PH UR: 6.5 — SIGNIFICANT CHANGE UP (ref 5–8)
PROT UR-MCNC: ABNORMAL
SP GR SPEC: 1.03 — HIGH (ref 1.01–1.02)
UROBILINOGEN FLD QL: SIGNIFICANT CHANGE UP

## 2019-12-26 PROCEDURE — 99213 OFFICE O/P EST LOW 20 MIN: CPT | Mod: TH

## 2019-12-26 PROCEDURE — G0463: CPT

## 2019-12-26 PROCEDURE — 81001 URINALYSIS AUTO W/SCOPE: CPT

## 2019-12-26 PROCEDURE — 87086 URINE CULTURE/COLONY COUNT: CPT

## 2019-12-26 PROCEDURE — 81003 URINALYSIS AUTO W/O SCOPE: CPT

## 2019-12-27 LAB
BACTERIA # UR AUTO: NEGATIVE — SIGNIFICANT CHANGE UP
CULTURE RESULTS: SIGNIFICANT CHANGE UP
EPI CELLS # UR: 17 /HPF — HIGH (ref 0–5)
HYALINE CASTS # UR AUTO: 0 /LPF — SIGNIFICANT CHANGE UP (ref 0–7)
RBC CASTS # UR COMP ASSIST: 3 /HPF — SIGNIFICANT CHANGE UP (ref 0–4)
SPECIMEN SOURCE: SIGNIFICANT CHANGE UP
WBC UR QL: 6 /HPF — HIGH (ref 0–5)

## 2019-12-30 DIAGNOSIS — Z34.93 ENCOUNTER FOR SUPERVISION OF NORMAL PREGNANCY, UNSPECIFIED, THIRD TRIMESTER: ICD-10-CM

## 2019-12-30 DIAGNOSIS — Z3A.31 31 WEEKS GESTATION OF PREGNANCY: ICD-10-CM

## 2020-01-09 ENCOUNTER — APPOINTMENT (OUTPATIENT)
Dept: OBGYN | Facility: CLINIC | Age: 25
End: 2020-01-09
Payer: MEDICAID

## 2020-01-09 ENCOUNTER — OUTPATIENT (OUTPATIENT)
Dept: OUTPATIENT SERVICES | Facility: HOSPITAL | Age: 25
LOS: 1 days | End: 2020-01-09
Payer: MEDICAID

## 2020-01-09 VITALS
DIASTOLIC BLOOD PRESSURE: 63 MMHG | SYSTOLIC BLOOD PRESSURE: 99 MMHG | WEIGHT: 167 LBS | BODY MASS INDEX: 32.79 KG/M2 | HEIGHT: 60 IN

## 2020-01-09 DIAGNOSIS — Z34.00 ENCOUNTER FOR SUPERVISION OF NORMAL FIRST PREGNANCY, UNSPECIFIED TRIMESTER: ICD-10-CM

## 2020-01-09 LAB
APPEARANCE UR: ABNORMAL
BACTERIA # UR AUTO: ABNORMAL
BILIRUB UR-MCNC: NEGATIVE — SIGNIFICANT CHANGE UP
COLOR SPEC: YELLOW — SIGNIFICANT CHANGE UP
DIFF PNL FLD: ABNORMAL
EPI CELLS # UR: >27 /HPF — HIGH (ref 0–5)
GLUCOSE UR QL: ABNORMAL
HYALINE CASTS # UR AUTO: 0 /LPF — SIGNIFICANT CHANGE UP (ref 0–7)
KETONES UR-MCNC: NEGATIVE — SIGNIFICANT CHANGE UP
LEUKOCYTE ESTERASE UR-ACNC: ABNORMAL
NITRITE UR-MCNC: NEGATIVE — SIGNIFICANT CHANGE UP
PH UR: 6.5 — SIGNIFICANT CHANGE UP (ref 5–8)
PROT UR-MCNC: ABNORMAL
RBC CASTS # UR COMP ASSIST: 3 /HPF — SIGNIFICANT CHANGE UP (ref 0–4)
SP GR SPEC: 1.03 — HIGH (ref 1.01–1.02)
UROBILINOGEN FLD QL: SIGNIFICANT CHANGE UP
WBC UR QL: 60 /HPF — HIGH (ref 0–5)

## 2020-01-09 PROCEDURE — 87086 URINE CULTURE/COLONY COUNT: CPT

## 2020-01-09 PROCEDURE — 81001 URINALYSIS AUTO W/SCOPE: CPT

## 2020-01-09 PROCEDURE — 81003 URINALYSIS AUTO W/O SCOPE: CPT

## 2020-01-09 PROCEDURE — G0463: CPT

## 2020-01-09 PROCEDURE — 99213 OFFICE O/P EST LOW 20 MIN: CPT | Mod: TH

## 2020-01-10 DIAGNOSIS — Z34.03 ENCOUNTER FOR SUPERVISION OF NORMAL FIRST PREGNANCY, THIRD TRIMESTER: ICD-10-CM

## 2020-01-10 DIAGNOSIS — Z3A.32 32 WEEKS GESTATION OF PREGNANCY: ICD-10-CM

## 2020-01-10 LAB
CULTURE RESULTS: NO GROWTH — SIGNIFICANT CHANGE UP
SPECIMEN SOURCE: SIGNIFICANT CHANGE UP

## 2020-01-16 ENCOUNTER — NON-APPOINTMENT (OUTPATIENT)
Age: 25
End: 2020-01-16

## 2020-01-21 ENCOUNTER — OUTPATIENT (OUTPATIENT)
Dept: OUTPATIENT SERVICES | Facility: HOSPITAL | Age: 25
LOS: 1 days | End: 2020-01-21
Payer: MEDICAID

## 2020-01-21 ENCOUNTER — NON-APPOINTMENT (OUTPATIENT)
Age: 25
End: 2020-01-21

## 2020-01-21 ENCOUNTER — APPOINTMENT (OUTPATIENT)
Dept: OBGYN | Facility: CLINIC | Age: 25
End: 2020-01-21
Payer: MEDICAID

## 2020-01-21 VITALS
DIASTOLIC BLOOD PRESSURE: 58 MMHG | BODY MASS INDEX: 32.79 KG/M2 | RESPIRATION RATE: 18 BRPM | HEIGHT: 60 IN | WEIGHT: 167 LBS | OXYGEN SATURATION: 98 % | HEART RATE: 85 BPM | TEMPERATURE: 98.2 F | SYSTOLIC BLOOD PRESSURE: 96 MMHG

## 2020-01-21 DIAGNOSIS — Z34.00 ENCOUNTER FOR SUPERVISION OF NORMAL FIRST PREGNANCY, UNSPECIFIED TRIMESTER: ICD-10-CM

## 2020-01-21 PROCEDURE — 99213 OFFICE O/P EST LOW 20 MIN: CPT | Mod: TH

## 2020-01-21 PROCEDURE — 81001 URINALYSIS AUTO W/SCOPE: CPT

## 2020-01-21 PROCEDURE — 84439 ASSAY OF FREE THYROXINE: CPT

## 2020-01-21 PROCEDURE — 84443 ASSAY THYROID STIM HORMONE: CPT

## 2020-01-21 PROCEDURE — G0463: CPT

## 2020-01-21 PROCEDURE — 85027 COMPLETE CBC AUTOMATED: CPT

## 2020-01-21 PROCEDURE — 86376 MICROSOMAL ANTIBODY EACH: CPT

## 2020-01-21 PROCEDURE — 87086 URINE CULTURE/COLONY COUNT: CPT

## 2020-01-21 PROCEDURE — 36415 COLL VENOUS BLD VENIPUNCTURE: CPT

## 2020-01-21 PROCEDURE — 83036 HEMOGLOBIN GLYCOSYLATED A1C: CPT

## 2020-01-21 PROCEDURE — 81003 URINALYSIS AUTO W/O SCOPE: CPT

## 2020-01-21 RX ORDER — AMOXICILLIN 500 MG/1
500 CAPSULE ORAL 3 TIMES DAILY
Qty: 21 | Refills: 0 | Status: DISCONTINUED | COMMUNITY
Start: 2019-12-10 | End: 2020-01-21

## 2020-01-21 RX ORDER — LEVOTHYROXINE SODIUM 0.07 MG/1
75 TABLET ORAL DAILY
Qty: 60 | Refills: 1 | Status: DISCONTINUED | COMMUNITY
Start: 2019-09-25 | End: 2020-01-21

## 2020-01-21 RX ORDER — FEXOFENADINE HYDROCHLORIDE AND PSEUDOEPHEDRINE HYDROCHLORIDE 180; 240 MG/1; MG/1
TABLET, FILM COATED, EXTENDED RELEASE ORAL
Refills: 0 | Status: DISCONTINUED | COMMUNITY
End: 2020-01-21

## 2020-01-21 RX ORDER — PNV NO.95/FERROUS FUM/FOLIC AC 28MG-0.8MG
TABLET ORAL
Refills: 0 | Status: DISCONTINUED | COMMUNITY
End: 2020-01-21

## 2020-01-22 DIAGNOSIS — Z3A.34 34 WEEKS GESTATION OF PREGNANCY: ICD-10-CM

## 2020-01-22 DIAGNOSIS — G93.5 COMPRESSION OF BRAIN: ICD-10-CM

## 2020-01-22 DIAGNOSIS — O99.019 ANEMIA COMPLICATING PREGNANCY, UNSPECIFIED TRIMESTER: ICD-10-CM

## 2020-01-22 DIAGNOSIS — M50.20 OTHER CERVICAL DISC DISPLACEMENT, UNSPECIFIED CERVICAL REGION: ICD-10-CM

## 2020-01-22 DIAGNOSIS — O99.280 ENDOCRINE, NUTRITIONAL AND METABOLIC DISEASES COMPLICATING PREGNANCY, UNSPECIFIED TRIMESTER: ICD-10-CM

## 2020-01-22 LAB
ANISOCYTOSIS BLD QL: SIGNIFICANT CHANGE UP
APPEARANCE UR: CLEAR — SIGNIFICANT CHANGE UP
BACTERIA # UR AUTO: ABNORMAL
BASOPHILS # BLD AUTO: 0 K/UL — SIGNIFICANT CHANGE UP (ref 0–0.2)
BASOPHILS NFR BLD AUTO: 0 % — SIGNIFICANT CHANGE UP (ref 0–2)
BILIRUB UR-MCNC: NEGATIVE — SIGNIFICANT CHANGE UP
COLOR SPEC: YELLOW — SIGNIFICANT CHANGE UP
DIFF PNL FLD: SIGNIFICANT CHANGE UP
ELLIPTOCYTES BLD QL SMEAR: SLIGHT — SIGNIFICANT CHANGE UP
EOSINOPHIL # BLD AUTO: 0.09 K/UL — SIGNIFICANT CHANGE UP (ref 0–0.5)
EOSINOPHIL NFR BLD AUTO: 0.9 % — SIGNIFICANT CHANGE UP (ref 0–6)
EPI CELLS # UR: 8 /HPF — HIGH (ref 0–5)
GLUCOSE UR QL: ABNORMAL
HBA1C BLD-MCNC: 4.8 % — SIGNIFICANT CHANGE UP (ref 4–5.6)
HCT VFR BLD CALC: 32.9 % — LOW (ref 34.5–45)
HGB BLD-MCNC: 10.7 G/DL — LOW (ref 11.5–15.5)
HYALINE CASTS # UR AUTO: 0 /LPF — SIGNIFICANT CHANGE UP (ref 0–7)
HYPOCHROMIA BLD QL: SLIGHT — SIGNIFICANT CHANGE UP
KETONES UR-MCNC: NEGATIVE — SIGNIFICANT CHANGE UP
LEUKOCYTE ESTERASE UR-ACNC: NEGATIVE — SIGNIFICANT CHANGE UP
LYMPHOCYTES # BLD AUTO: 1.11 K/UL — SIGNIFICANT CHANGE UP (ref 1–3.3)
LYMPHOCYTES # BLD AUTO: 11.5 % — LOW (ref 13–44)
MACROCYTES BLD QL: SLIGHT — SIGNIFICANT CHANGE UP
MANUAL SMEAR VERIFICATION: SIGNIFICANT CHANGE UP
MCHC RBC-ENTMCNC: 27.5 PG — SIGNIFICANT CHANGE UP (ref 27–34)
MCHC RBC-ENTMCNC: 32.5 GM/DL — SIGNIFICANT CHANGE UP (ref 32–36)
MCV RBC AUTO: 84.6 FL — SIGNIFICANT CHANGE UP (ref 80–100)
MICROCYTES BLD QL: SIGNIFICANT CHANGE UP
MONOCYTES # BLD AUTO: 0.51 K/UL — SIGNIFICANT CHANGE UP (ref 0–0.9)
MONOCYTES NFR BLD AUTO: 5.3 % — SIGNIFICANT CHANGE UP (ref 2–14)
NEUTROPHILS # BLD AUTO: 7.86 K/UL — HIGH (ref 1.8–7.4)
NEUTROPHILS NFR BLD AUTO: 81.4 % — HIGH (ref 43–77)
NITRITE UR-MCNC: NEGATIVE — SIGNIFICANT CHANGE UP
PH UR: 6 — SIGNIFICANT CHANGE UP (ref 5–8)
PLAT MORPH BLD: NORMAL — SIGNIFICANT CHANGE UP
PLATELET # BLD AUTO: 312 K/UL — SIGNIFICANT CHANGE UP (ref 150–400)
POIKILOCYTOSIS BLD QL AUTO: SLIGHT — SIGNIFICANT CHANGE UP
POLYCHROMASIA BLD QL SMEAR: SIGNIFICANT CHANGE UP
PROT UR-MCNC: ABNORMAL
RBC # BLD: 3.89 M/UL — SIGNIFICANT CHANGE UP (ref 3.8–5.2)
RBC # FLD: SIGNIFICANT CHANGE UP (ref 10.3–14.5)
RBC BLD AUTO: ABNORMAL
RBC CASTS # UR COMP ASSIST: 4 /HPF — SIGNIFICANT CHANGE UP (ref 0–4)
SP GR SPEC: >=1.03 — SIGNIFICANT CHANGE UP (ref 1.01–1.02)
T4 FREE SERPL-MCNC: 1 NG/DL — SIGNIFICANT CHANGE UP (ref 0.9–1.8)
THYROPEROXIDASE AB SERPL-ACNC: 150 IU/ML — HIGH
TSH SERPL-MCNC: 5.35 UIU/ML — HIGH (ref 0.27–4.2)
UROBILINOGEN FLD QL: SIGNIFICANT CHANGE UP
VARIANT LYMPHS # BLD: 0.9 % — SIGNIFICANT CHANGE UP (ref 0–6)
WBC # BLD: 9.66 K/UL — SIGNIFICANT CHANGE UP (ref 3.8–10.5)
WBC # FLD AUTO: 9.66 K/UL — SIGNIFICANT CHANGE UP (ref 3.8–10.5)
WBC UR QL: 5 /HPF — SIGNIFICANT CHANGE UP (ref 0–5)

## 2020-01-23 LAB
CULTURE RESULTS: NO GROWTH — SIGNIFICANT CHANGE UP
SPECIMEN SOURCE: SIGNIFICANT CHANGE UP

## 2020-01-27 ENCOUNTER — ASOB RESULT (OUTPATIENT)
Age: 25
End: 2020-01-27

## 2020-01-27 ENCOUNTER — APPOINTMENT (OUTPATIENT)
Dept: ANTEPARTUM | Facility: CLINIC | Age: 25
End: 2020-01-27
Payer: MEDICAID

## 2020-01-27 ENCOUNTER — OUTPATIENT (OUTPATIENT)
Dept: OUTPATIENT SERVICES | Facility: HOSPITAL | Age: 25
LOS: 1 days | End: 2020-01-27
Payer: MEDICAID

## 2020-01-27 DIAGNOSIS — Z3A.00 WEEKS OF GESTATION OF PREGNANCY NOT SPECIFIED: ICD-10-CM

## 2020-01-27 DIAGNOSIS — O26.899 OTHER SPECIFIED PREGNANCY RELATED CONDITIONS, UNSPECIFIED TRIMESTER: ICD-10-CM

## 2020-01-27 PROCEDURE — 59025 FETAL NON-STRESS TEST: CPT | Mod: 26

## 2020-01-27 PROCEDURE — 96360 HYDRATION IV INFUSION INIT: CPT

## 2020-01-27 PROCEDURE — 36415 COLL VENOUS BLD VENIPUNCTURE: CPT

## 2020-01-27 PROCEDURE — 76816 OB US FOLLOW-UP PER FETUS: CPT

## 2020-01-27 RX ORDER — ONDANSETRON 8 MG/1
4 TABLET, FILM COATED ORAL ONCE
Refills: 0 | Status: COMPLETED | OUTPATIENT
Start: 2020-01-27 | End: 2020-01-28

## 2020-01-27 RX ORDER — SODIUM CHLORIDE 9 MG/ML
1000 INJECTION, SOLUTION INTRAVENOUS ONCE
Refills: 0 | Status: COMPLETED | OUTPATIENT
Start: 2020-01-27 | End: 2020-01-27

## 2020-01-27 RX ADMIN — SODIUM CHLORIDE 1000 MILLILITER(S): 9 INJECTION, SOLUTION INTRAVENOUS at 23:45

## 2020-01-28 LAB
ALBUMIN SERPL ELPH-MCNC: 2.9 G/DL — LOW (ref 3.5–5)
ALP SERPL-CCNC: 161 U/L — HIGH (ref 40–120)
ALT FLD-CCNC: 12 U/L DA — SIGNIFICANT CHANGE UP (ref 10–60)
AMYLASE P1 CFR SERPL: 22 U/L — LOW (ref 25–115)
ANION GAP SERPL CALC-SCNC: 10 MMOL/L — SIGNIFICANT CHANGE UP (ref 5–17)
APPEARANCE UR: ABNORMAL
AST SERPL-CCNC: 12 U/L — SIGNIFICANT CHANGE UP (ref 10–40)
BASOPHILS # BLD AUTO: 0.01 K/UL — SIGNIFICANT CHANGE UP (ref 0–0.2)
BASOPHILS NFR BLD AUTO: 0.1 % — SIGNIFICANT CHANGE UP (ref 0–2)
BILIRUB SERPL-MCNC: 0.3 MG/DL — SIGNIFICANT CHANGE UP (ref 0.2–1.2)
BILIRUB UR-MCNC: NEGATIVE — SIGNIFICANT CHANGE UP
BUN SERPL-MCNC: 10 MG/DL — SIGNIFICANT CHANGE UP (ref 7–18)
CALCIUM SERPL-MCNC: 8.6 MG/DL — SIGNIFICANT CHANGE UP (ref 8.4–10.5)
CHLORIDE SERPL-SCNC: 106 MMOL/L — SIGNIFICANT CHANGE UP (ref 96–108)
CO2 SERPL-SCNC: 22 MMOL/L — SIGNIFICANT CHANGE UP (ref 22–31)
COLOR SPEC: YELLOW — SIGNIFICANT CHANGE UP
CREAT SERPL-MCNC: 0.54 MG/DL — SIGNIFICANT CHANGE UP (ref 0.5–1.3)
DIFF PNL FLD: ABNORMAL
EOSINOPHIL # BLD AUTO: 0.03 K/UL — SIGNIFICANT CHANGE UP (ref 0–0.5)
EOSINOPHIL NFR BLD AUTO: 0.2 % — SIGNIFICANT CHANGE UP (ref 0–6)
GLUCOSE SERPL-MCNC: 83 MG/DL — SIGNIFICANT CHANGE UP (ref 70–99)
GLUCOSE UR QL: 100 MG/DL
HCT VFR BLD CALC: 36 % — SIGNIFICANT CHANGE UP (ref 34.5–45)
HGB BLD-MCNC: 11.7 G/DL — SIGNIFICANT CHANGE UP (ref 11.5–15.5)
IMM GRANULOCYTES NFR BLD AUTO: 0.4 % — SIGNIFICANT CHANGE UP (ref 0–1.5)
KETONES UR-MCNC: ABNORMAL
LEUKOCYTE ESTERASE UR-ACNC: ABNORMAL
LIDOCAIN IGE QN: 71 U/L — LOW (ref 73–393)
LYMPHOCYTES # BLD AUTO: 0.75 K/UL — LOW (ref 1–3.3)
LYMPHOCYTES # BLD AUTO: 5.8 % — LOW (ref 13–44)
MCHC RBC-ENTMCNC: 27.2 PG — SIGNIFICANT CHANGE UP (ref 27–34)
MCHC RBC-ENTMCNC: 32.5 GM/DL — SIGNIFICANT CHANGE UP (ref 32–36)
MCV RBC AUTO: 83.7 FL — SIGNIFICANT CHANGE UP (ref 80–100)
MONOCYTES # BLD AUTO: 0.32 K/UL — SIGNIFICANT CHANGE UP (ref 0–0.9)
MONOCYTES NFR BLD AUTO: 2.5 % — SIGNIFICANT CHANGE UP (ref 2–14)
NEUTROPHILS # BLD AUTO: 11.68 K/UL — HIGH (ref 1.8–7.4)
NEUTROPHILS NFR BLD AUTO: 91 % — HIGH (ref 43–77)
NITRITE UR-MCNC: NEGATIVE — SIGNIFICANT CHANGE UP
NRBC # BLD: 0 /100 WBCS — SIGNIFICANT CHANGE UP (ref 0–0)
PH UR: 6 — SIGNIFICANT CHANGE UP (ref 5–8)
PLATELET # BLD AUTO: 264 K/UL — SIGNIFICANT CHANGE UP (ref 150–400)
POTASSIUM SERPL-MCNC: 3.5 MMOL/L — SIGNIFICANT CHANGE UP (ref 3.5–5.3)
POTASSIUM SERPL-SCNC: 3.5 MMOL/L — SIGNIFICANT CHANGE UP (ref 3.5–5.3)
PROT SERPL-MCNC: 7.1 G/DL — SIGNIFICANT CHANGE UP (ref 6–8.3)
PROT UR-MCNC: 100
RBC # BLD: 4.3 M/UL — SIGNIFICANT CHANGE UP (ref 3.8–5.2)
RBC # FLD: SIGNIFICANT CHANGE UP (ref 10.3–14.5)
SODIUM SERPL-SCNC: 138 MMOL/L — SIGNIFICANT CHANGE UP (ref 135–145)
SP GR SPEC: 1.02 — SIGNIFICANT CHANGE UP (ref 1.01–1.02)
UROBILINOGEN FLD QL: NEGATIVE — SIGNIFICANT CHANGE UP
WBC # BLD: 12.84 K/UL — HIGH (ref 3.8–10.5)
WBC # FLD AUTO: 12.84 K/UL — HIGH (ref 3.8–10.5)

## 2020-01-28 PROCEDURE — 85027 COMPLETE CBC AUTOMATED: CPT

## 2020-01-28 PROCEDURE — 59025 FETAL NON-STRESS TEST: CPT

## 2020-01-28 PROCEDURE — 82150 ASSAY OF AMYLASE: CPT

## 2020-01-28 PROCEDURE — 83690 ASSAY OF LIPASE: CPT

## 2020-01-28 PROCEDURE — 36415 COLL VENOUS BLD VENIPUNCTURE: CPT

## 2020-01-28 PROCEDURE — 80053 COMPREHEN METABOLIC PANEL: CPT

## 2020-01-28 PROCEDURE — 81001 URINALYSIS AUTO W/SCOPE: CPT

## 2020-01-28 PROCEDURE — G0463: CPT

## 2020-01-28 RX ADMIN — ONDANSETRON 4 MILLIGRAM(S): 8 TABLET, FILM COATED ORAL at 00:10

## 2020-02-01 ENCOUNTER — OUTPATIENT (OUTPATIENT)
Dept: OUTPATIENT SERVICES | Facility: HOSPITAL | Age: 25
LOS: 1 days | End: 2020-02-01

## 2020-02-04 ENCOUNTER — OUTPATIENT (OUTPATIENT)
Dept: OUTPATIENT SERVICES | Facility: HOSPITAL | Age: 25
LOS: 1 days | End: 2020-02-04
Payer: MEDICAID

## 2020-02-04 ENCOUNTER — APPOINTMENT (OUTPATIENT)
Dept: OBGYN | Facility: CLINIC | Age: 25
End: 2020-02-04
Payer: MEDICAID

## 2020-02-04 ENCOUNTER — NON-APPOINTMENT (OUTPATIENT)
Age: 25
End: 2020-02-04

## 2020-02-04 VITALS
SYSTOLIC BLOOD PRESSURE: 97 MMHG | HEIGHT: 60 IN | BODY MASS INDEX: 33.38 KG/M2 | DIASTOLIC BLOOD PRESSURE: 60 MMHG | WEIGHT: 170 LBS

## 2020-02-04 DIAGNOSIS — Z34.00 ENCOUNTER FOR SUPERVISION OF NORMAL FIRST PREGNANCY, UNSPECIFIED TRIMESTER: ICD-10-CM

## 2020-02-04 LAB
ANISOCYTOSIS BLD QL: SIGNIFICANT CHANGE UP
BASOPHILS # BLD AUTO: 0.03 K/UL — SIGNIFICANT CHANGE UP (ref 0–0.2)
BASOPHILS NFR BLD AUTO: 0.3 % — SIGNIFICANT CHANGE UP (ref 0–2)
EOSINOPHIL # BLD AUTO: 0.12 K/UL — SIGNIFICANT CHANGE UP (ref 0–0.5)
EOSINOPHIL NFR BLD AUTO: 1.3 % — SIGNIFICANT CHANGE UP (ref 0–6)
HCT VFR BLD CALC: 34.9 % — SIGNIFICANT CHANGE UP (ref 34.5–45)
HCV AB S/CO SERPL IA: 0.11 S/CO — SIGNIFICANT CHANGE UP (ref 0–0.99)
HCV AB SERPL-IMP: SIGNIFICANT CHANGE UP
HGB BLD-MCNC: 11.2 G/DL — LOW (ref 11.5–15.5)
IMM GRANULOCYTES NFR BLD AUTO: 0.5 % — SIGNIFICANT CHANGE UP (ref 0–1.5)
LYMPHOCYTES # BLD AUTO: 1.54 K/UL — SIGNIFICANT CHANGE UP (ref 1–3.3)
LYMPHOCYTES # BLD AUTO: 16.6 % — SIGNIFICANT CHANGE UP (ref 13–44)
MANUAL SMEAR VERIFICATION: SIGNIFICANT CHANGE UP
MCHC RBC-ENTMCNC: 27.1 PG — SIGNIFICANT CHANGE UP (ref 27–34)
MCHC RBC-ENTMCNC: 32.1 GM/DL — SIGNIFICANT CHANGE UP (ref 32–36)
MCV RBC AUTO: 84.3 FL — SIGNIFICANT CHANGE UP (ref 80–100)
MONOCYTES # BLD AUTO: 0.48 K/UL — SIGNIFICANT CHANGE UP (ref 0–0.9)
MONOCYTES NFR BLD AUTO: 5.2 % — SIGNIFICANT CHANGE UP (ref 2–14)
NEUTROPHILS # BLD AUTO: 7.08 K/UL — SIGNIFICANT CHANGE UP (ref 1.8–7.4)
NEUTROPHILS NFR BLD AUTO: 76.1 % — SIGNIFICANT CHANGE UP (ref 43–77)
PLAT MORPH BLD: NORMAL — SIGNIFICANT CHANGE UP
PLATELET # BLD AUTO: 298 K/UL — SIGNIFICANT CHANGE UP (ref 150–400)
POLYCHROMASIA BLD QL SMEAR: SLIGHT — SIGNIFICANT CHANGE UP
RBC # BLD: 4.14 M/UL — SIGNIFICANT CHANGE UP (ref 3.8–5.2)
RBC # FLD: SIGNIFICANT CHANGE UP (ref 10.3–14.5)
RBC BLD AUTO: ABNORMAL
WBC # BLD: 9.3 K/UL — SIGNIFICANT CHANGE UP (ref 3.8–10.5)
WBC # FLD AUTO: 9.3 K/UL — SIGNIFICANT CHANGE UP (ref 3.8–10.5)

## 2020-02-04 PROCEDURE — 81003 URINALYSIS AUTO W/O SCOPE: CPT

## 2020-02-04 PROCEDURE — 87491 CHLMYD TRACH DNA AMP PROBE: CPT

## 2020-02-04 PROCEDURE — 86780 TREPONEMA PALLIDUM: CPT

## 2020-02-04 PROCEDURE — 86765 RUBEOLA ANTIBODY: CPT

## 2020-02-04 PROCEDURE — G0463: CPT

## 2020-02-04 PROCEDURE — 86803 HEPATITIS C AB TEST: CPT

## 2020-02-04 PROCEDURE — 36415 COLL VENOUS BLD VENIPUNCTURE: CPT

## 2020-02-04 PROCEDURE — 87653 STREP B DNA AMP PROBE: CPT

## 2020-02-04 PROCEDURE — 87591 N.GONORRHOEAE DNA AMP PROB: CPT

## 2020-02-04 PROCEDURE — 85027 COMPLETE CBC AUTOMATED: CPT

## 2020-02-04 PROCEDURE — 87389 HIV-1 AG W/HIV-1&-2 AB AG IA: CPT

## 2020-02-04 PROCEDURE — 99213 OFFICE O/P EST LOW 20 MIN: CPT | Mod: TH

## 2020-02-05 DIAGNOSIS — O99.019 ANEMIA COMPLICATING PREGNANCY, UNSPECIFIED TRIMESTER: ICD-10-CM

## 2020-02-05 DIAGNOSIS — G93.5 COMPRESSION OF BRAIN: ICD-10-CM

## 2020-02-05 DIAGNOSIS — O99.280 ENDOCRINE, NUTRITIONAL AND METABOLIC DISEASES COMPLICATING PREGNANCY, UNSPECIFIED TRIMESTER: ICD-10-CM

## 2020-02-05 DIAGNOSIS — M50.20 OTHER CERVICAL DISC DISPLACEMENT, UNSPECIFIED CERVICAL REGION: ICD-10-CM

## 2020-02-05 DIAGNOSIS — Z34.03 ENCOUNTER FOR SUPERVISION OF NORMAL FIRST PREGNANCY, THIRD TRIMESTER: ICD-10-CM

## 2020-02-05 LAB
C TRACH RRNA SPEC QL NAA+PROBE: SIGNIFICANT CHANGE UP
GROUP B BETA STREP DNA (PCR): SIGNIFICANT CHANGE UP
GROUP B BETA STREP INTERPRETATION: SIGNIFICANT CHANGE UP
HIV 1+2 AB+HIV1 P24 AG SERPL QL IA: SIGNIFICANT CHANGE UP
MEV IGG SER-ACNC: 153 AU/ML — SIGNIFICANT CHANGE UP
MEV IGG+IGM SER-IMP: POSITIVE — SIGNIFICANT CHANGE UP
N GONORRHOEA RRNA SPEC QL NAA+PROBE: SIGNIFICANT CHANGE UP
SOURCE GROUP B STREP: SIGNIFICANT CHANGE UP
SPECIMEN SOURCE: SIGNIFICANT CHANGE UP
T PALLIDUM AB TITR SER: NEGATIVE — SIGNIFICANT CHANGE UP

## 2020-02-10 ENCOUNTER — APPOINTMENT (OUTPATIENT)
Dept: OBGYN | Facility: CLINIC | Age: 25
End: 2020-02-10
Payer: MEDICAID

## 2020-02-10 ENCOUNTER — NON-APPOINTMENT (OUTPATIENT)
Age: 25
End: 2020-02-10

## 2020-02-10 ENCOUNTER — OUTPATIENT (OUTPATIENT)
Dept: OUTPATIENT SERVICES | Facility: HOSPITAL | Age: 25
LOS: 1 days | End: 2020-02-10
Payer: MEDICAID

## 2020-02-10 VITALS
DIASTOLIC BLOOD PRESSURE: 68 MMHG | SYSTOLIC BLOOD PRESSURE: 107 MMHG | WEIGHT: 171 LBS | HEIGHT: 60 IN | BODY MASS INDEX: 33.57 KG/M2

## 2020-02-10 DIAGNOSIS — Z34.00 ENCOUNTER FOR SUPERVISION OF NORMAL FIRST PREGNANCY, UNSPECIFIED TRIMESTER: ICD-10-CM

## 2020-02-10 LAB
APPEARANCE UR: ABNORMAL
BACTERIA # UR AUTO: NEGATIVE — SIGNIFICANT CHANGE UP
BILIRUB UR-MCNC: NEGATIVE — SIGNIFICANT CHANGE UP
COLOR SPEC: YELLOW — SIGNIFICANT CHANGE UP
COMMENT - URINE: SIGNIFICANT CHANGE UP
DIFF PNL FLD: SIGNIFICANT CHANGE UP
EPI CELLS # UR: 24 /HPF — HIGH (ref 0–5)
GLUCOSE UR QL: ABNORMAL
HYALINE CASTS # UR AUTO: 0 /LPF — SIGNIFICANT CHANGE UP (ref 0–7)
KETONES UR-MCNC: ABNORMAL
LEUKOCYTE ESTERASE UR-ACNC: NEGATIVE — SIGNIFICANT CHANGE UP
NITRITE UR-MCNC: NEGATIVE — SIGNIFICANT CHANGE UP
PH UR: 6.5 — SIGNIFICANT CHANGE UP (ref 5–8)
PROT UR-MCNC: ABNORMAL
RBC CASTS # UR COMP ASSIST: 2 /HPF — SIGNIFICANT CHANGE UP (ref 0–4)
SP GR SPEC: 1.02 — SIGNIFICANT CHANGE UP (ref 1.01–1.02)
UROBILINOGEN FLD QL: SIGNIFICANT CHANGE UP
WBC UR QL: 5 /HPF — SIGNIFICANT CHANGE UP (ref 0–5)

## 2020-02-10 PROCEDURE — G0463: CPT

## 2020-02-10 PROCEDURE — 87086 URINE CULTURE/COLONY COUNT: CPT

## 2020-02-10 PROCEDURE — 99213 OFFICE O/P EST LOW 20 MIN: CPT | Mod: TH

## 2020-02-10 PROCEDURE — 81001 URINALYSIS AUTO W/SCOPE: CPT

## 2020-02-11 DIAGNOSIS — M50.20 OTHER CERVICAL DISC DISPLACEMENT, UNSPECIFIED CERVICAL REGION: ICD-10-CM

## 2020-02-11 DIAGNOSIS — Z34.93 ENCOUNTER FOR SUPERVISION OF NORMAL PREGNANCY, UNSPECIFIED, THIRD TRIMESTER: ICD-10-CM

## 2020-02-11 DIAGNOSIS — G93.5 COMPRESSION OF BRAIN: ICD-10-CM

## 2020-02-11 DIAGNOSIS — Z3A.37 37 WEEKS GESTATION OF PREGNANCY: ICD-10-CM

## 2020-02-12 LAB
CULTURE RESULTS: SIGNIFICANT CHANGE UP
SPECIMEN SOURCE: SIGNIFICANT CHANGE UP

## 2020-02-18 ENCOUNTER — APPOINTMENT (OUTPATIENT)
Dept: ANTEPARTUM | Facility: CLINIC | Age: 25
End: 2020-02-18
Payer: MEDICAID

## 2020-02-18 ENCOUNTER — INPATIENT (INPATIENT)
Facility: HOSPITAL | Age: 25
LOS: 2 days | Discharge: HOME | End: 2020-02-21
Attending: OBSTETRICS & GYNECOLOGY | Admitting: OBSTETRICS & GYNECOLOGY
Payer: MEDICAID

## 2020-02-18 ENCOUNTER — ASOB RESULT (OUTPATIENT)
Age: 25
End: 2020-02-18

## 2020-02-18 DIAGNOSIS — Z3A.00 WEEKS OF GESTATION OF PREGNANCY NOT SPECIFIED: ICD-10-CM

## 2020-02-18 DIAGNOSIS — O26.899 OTHER SPECIFIED PREGNANCY RELATED CONDITIONS, UNSPECIFIED TRIMESTER: ICD-10-CM

## 2020-02-18 DIAGNOSIS — Z34.80 ENCOUNTER FOR SUPERVISION OF OTHER NORMAL PREGNANCY, UNSPECIFIED TRIMESTER: ICD-10-CM

## 2020-02-18 LAB
APTT BLD: 29.4 SEC — SIGNIFICANT CHANGE UP (ref 27.5–36.3)
BASOPHILS # BLD AUTO: 0.02 K/UL — SIGNIFICANT CHANGE UP (ref 0–0.2)
BASOPHILS NFR BLD AUTO: 0.3 % — SIGNIFICANT CHANGE UP (ref 0–2)
BLD GP AB SCN SERPL QL: SIGNIFICANT CHANGE UP
EOSINOPHIL # BLD AUTO: 0.02 K/UL — SIGNIFICANT CHANGE UP (ref 0–0.5)
EOSINOPHIL NFR BLD AUTO: 0.3 % — SIGNIFICANT CHANGE UP (ref 0–6)
FIBRINOGEN PPP-MCNC: 593 MG/DL — HIGH (ref 350–510)
HCT VFR BLD CALC: 35.1 % — SIGNIFICANT CHANGE UP (ref 34.5–45)
HGB BLD-MCNC: 11.7 G/DL — SIGNIFICANT CHANGE UP (ref 11.5–15.5)
IMM GRANULOCYTES NFR BLD AUTO: 0.4 % — SIGNIFICANT CHANGE UP (ref 0–1.5)
INR BLD: 0.96 RATIO — SIGNIFICANT CHANGE UP (ref 0.88–1.16)
LYMPHOCYTES # BLD AUTO: 1.07 K/UL — SIGNIFICANT CHANGE UP (ref 1–3.3)
LYMPHOCYTES # BLD AUTO: 13.6 % — SIGNIFICANT CHANGE UP (ref 13–44)
MCHC RBC-ENTMCNC: 28.3 PG — SIGNIFICANT CHANGE UP (ref 27–34)
MCHC RBC-ENTMCNC: 33.3 GM/DL — SIGNIFICANT CHANGE UP (ref 32–36)
MCV RBC AUTO: 84.8 FL — SIGNIFICANT CHANGE UP (ref 80–100)
MONOCYTES # BLD AUTO: 0.65 K/UL — SIGNIFICANT CHANGE UP (ref 0–0.9)
MONOCYTES NFR BLD AUTO: 8.3 % — SIGNIFICANT CHANGE UP (ref 2–14)
NEUTROPHILS # BLD AUTO: 6.05 K/UL — SIGNIFICANT CHANGE UP (ref 1.8–7.4)
NEUTROPHILS NFR BLD AUTO: 77.1 % — HIGH (ref 43–77)
NRBC # BLD: 0 /100 WBCS — SIGNIFICANT CHANGE UP (ref 0–0)
PLATELET # BLD AUTO: 234 K/UL — SIGNIFICANT CHANGE UP (ref 150–400)
PROTHROM AB SERPL-ACNC: 10.6 SEC — SIGNIFICANT CHANGE UP (ref 10–12.9)
RBC # BLD: 4.14 M/UL — SIGNIFICANT CHANGE UP (ref 3.8–5.2)
RBC # FLD: 23 % — HIGH (ref 10.3–14.5)
WBC # BLD: 7.84 K/UL — SIGNIFICANT CHANGE UP (ref 3.8–10.5)
WBC # FLD AUTO: 7.84 K/UL — SIGNIFICANT CHANGE UP (ref 3.8–10.5)

## 2020-02-18 PROCEDURE — 76816 OB US FOLLOW-UP PER FETUS: CPT

## 2020-02-18 PROCEDURE — 76820 UMBILICAL ARTERY ECHO: CPT

## 2020-02-18 PROCEDURE — 76819 FETAL BIOPHYS PROFIL W/O NST: CPT

## 2020-02-18 RX ORDER — SODIUM CHLORIDE 9 MG/ML
1000 INJECTION, SOLUTION INTRAVENOUS
Refills: 0 | Status: DISCONTINUED | OUTPATIENT
Start: 2020-02-18 | End: 2020-02-19

## 2020-02-18 RX ORDER — OXYTOCIN 10 UNIT/ML
333.33 VIAL (ML) INJECTION
Qty: 20 | Refills: 0 | Status: DISCONTINUED | OUTPATIENT
Start: 2020-02-18 | End: 2020-02-21

## 2020-02-18 RX ORDER — AMPICILLIN TRIHYDRATE 250 MG
1 CAPSULE ORAL EVERY 4 HOURS
Refills: 0 | Status: DISCONTINUED | OUTPATIENT
Start: 2020-02-18 | End: 2020-02-18

## 2020-02-18 RX ORDER — SODIUM CHLORIDE 9 MG/ML
1000 INJECTION, SOLUTION INTRAVENOUS
Refills: 0 | Status: DISCONTINUED | OUTPATIENT
Start: 2020-02-18 | End: 2020-02-18

## 2020-02-18 RX ORDER — CITRIC ACID/SODIUM CITRATE 300-500 MG
15 SOLUTION, ORAL ORAL EVERY 6 HOURS
Refills: 0 | Status: DISCONTINUED | OUTPATIENT
Start: 2020-02-18 | End: 2020-02-19

## 2020-02-18 RX ORDER — CITRIC ACID/SODIUM CITRATE 300-500 MG
15 SOLUTION, ORAL ORAL EVERY 6 HOURS
Refills: 0 | Status: DISCONTINUED | OUTPATIENT
Start: 2020-02-18 | End: 2020-02-18

## 2020-02-18 RX ORDER — AMPICILLIN TRIHYDRATE 250 MG
2 CAPSULE ORAL ONCE
Refills: 0 | Status: DISCONTINUED | OUTPATIENT
Start: 2020-02-18 | End: 2020-02-18

## 2020-02-19 ENCOUNTER — RESULT REVIEW (OUTPATIENT)
Age: 25
End: 2020-02-19

## 2020-02-19 VITALS — HEIGHT: 60 IN | WEIGHT: 169.76 LBS

## 2020-02-19 LAB
FLU A RESULT: SIGNIFICANT CHANGE UP
FLU A RESULT: SIGNIFICANT CHANGE UP
FLUAV AG NPH QL: SIGNIFICANT CHANGE UP
FLUBV AG NPH QL: DETECTED
RSV RESULT: SIGNIFICANT CHANGE UP
RSV RNA RESP QL NAA+PROBE: SIGNIFICANT CHANGE UP
T PALLIDUM AB TITR SER: NEGATIVE — SIGNIFICANT CHANGE UP

## 2020-02-19 PROCEDURE — 88307 TISSUE EXAM BY PATHOLOGIST: CPT | Mod: 26

## 2020-02-19 PROCEDURE — 59409 OBSTETRICAL CARE: CPT | Mod: U7

## 2020-02-19 RX ORDER — MAGNESIUM HYDROXIDE 400 MG/1
30 TABLET, CHEWABLE ORAL
Refills: 0 | Status: DISCONTINUED | OUTPATIENT
Start: 2020-02-19 | End: 2020-02-21

## 2020-02-19 RX ORDER — IBUPROFEN 200 MG
600 TABLET ORAL EVERY 6 HOURS
Refills: 0 | Status: COMPLETED | OUTPATIENT
Start: 2020-02-19 | End: 2021-01-17

## 2020-02-19 RX ORDER — KETOROLAC TROMETHAMINE 30 MG/ML
30 SYRINGE (ML) INJECTION ONCE
Refills: 0 | Status: DISCONTINUED | OUTPATIENT
Start: 2020-02-19 | End: 2020-02-21

## 2020-02-19 RX ORDER — HYDROCORTISONE 1 %
1 OINTMENT (GRAM) TOPICAL EVERY 6 HOURS
Refills: 0 | Status: DISCONTINUED | OUTPATIENT
Start: 2020-02-19 | End: 2020-02-21

## 2020-02-19 RX ORDER — LANOLIN
1 OINTMENT (GRAM) TOPICAL EVERY 6 HOURS
Refills: 0 | Status: DISCONTINUED | OUTPATIENT
Start: 2020-02-19 | End: 2020-02-21

## 2020-02-19 RX ORDER — OXYTOCIN 10 UNIT/ML
2 VIAL (ML) INJECTION
Qty: 30 | Refills: 0 | Status: DISCONTINUED | OUTPATIENT
Start: 2020-02-19 | End: 2020-02-19

## 2020-02-19 RX ORDER — PRAMOXINE HYDROCHLORIDE 150 MG/15G
1 AEROSOL, FOAM RECTAL EVERY 4 HOURS
Refills: 0 | Status: DISCONTINUED | OUTPATIENT
Start: 2020-02-19 | End: 2020-02-21

## 2020-02-19 RX ORDER — SODIUM CHLORIDE 9 MG/ML
3 INJECTION INTRAMUSCULAR; INTRAVENOUS; SUBCUTANEOUS EVERY 8 HOURS
Refills: 0 | Status: DISCONTINUED | OUTPATIENT
Start: 2020-02-19 | End: 2020-02-21

## 2020-02-19 RX ORDER — OXYCODONE HYDROCHLORIDE 5 MG/1
5 TABLET ORAL
Refills: 0 | Status: DISCONTINUED | OUTPATIENT
Start: 2020-02-19 | End: 2020-02-21

## 2020-02-19 RX ORDER — LEVOTHYROXINE SODIUM 125 MCG
100 TABLET ORAL DAILY
Refills: 0 | Status: DISCONTINUED | OUTPATIENT
Start: 2020-02-19 | End: 2020-02-21

## 2020-02-19 RX ORDER — SIMETHICONE 80 MG/1
80 TABLET, CHEWABLE ORAL EVERY 4 HOURS
Refills: 0 | Status: DISCONTINUED | OUTPATIENT
Start: 2020-02-19 | End: 2020-02-21

## 2020-02-19 RX ORDER — TETANUS TOXOID, REDUCED DIPHTHERIA TOXOID AND ACELLULAR PERTUSSIS VACCINE, ADSORBED 5; 2.5; 8; 8; 2.5 [IU]/.5ML; [IU]/.5ML; UG/.5ML; UG/.5ML; UG/.5ML
0.5 SUSPENSION INTRAMUSCULAR ONCE
Refills: 0 | Status: DISCONTINUED | OUTPATIENT
Start: 2020-02-19 | End: 2020-02-21

## 2020-02-19 RX ORDER — BENZOCAINE 10 %
1 GEL (GRAM) MUCOUS MEMBRANE EVERY 6 HOURS
Refills: 0 | Status: DISCONTINUED | OUTPATIENT
Start: 2020-02-19 | End: 2020-02-21

## 2020-02-19 RX ORDER — GLYCERIN ADULT
1 SUPPOSITORY, RECTAL RECTAL AT BEDTIME
Refills: 0 | Status: DISCONTINUED | OUTPATIENT
Start: 2020-02-19 | End: 2020-02-21

## 2020-02-19 RX ORDER — AER TRAVELER 0.5 G/1
1 SOLUTION RECTAL; TOPICAL EVERY 4 HOURS
Refills: 0 | Status: DISCONTINUED | OUTPATIENT
Start: 2020-02-19 | End: 2020-02-21

## 2020-02-19 RX ORDER — OXYTOCIN 10 UNIT/ML
333.33 VIAL (ML) INJECTION
Qty: 20 | Refills: 0 | Status: DISCONTINUED | OUTPATIENT
Start: 2020-02-19 | End: 2020-02-21

## 2020-02-19 RX ORDER — DIPHENHYDRAMINE HCL 50 MG
25 CAPSULE ORAL EVERY 6 HOURS
Refills: 0 | Status: DISCONTINUED | OUTPATIENT
Start: 2020-02-19 | End: 2020-02-21

## 2020-02-19 RX ORDER — OXYCODONE HYDROCHLORIDE 5 MG/1
5 TABLET ORAL ONCE
Refills: 0 | Status: DISCONTINUED | OUTPATIENT
Start: 2020-02-19 | End: 2020-02-21

## 2020-02-19 RX ORDER — DIBUCAINE 1 %
1 OINTMENT (GRAM) RECTAL EVERY 6 HOURS
Refills: 0 | Status: DISCONTINUED | OUTPATIENT
Start: 2020-02-19 | End: 2020-02-21

## 2020-02-19 RX ADMIN — Medication 1 TABLET(S): at 17:39

## 2020-02-19 RX ADMIN — Medication 75 MILLIGRAM(S): at 17:40

## 2020-02-19 RX ADMIN — Medication 75 MILLIGRAM(S): at 02:29

## 2020-02-19 RX ADMIN — SODIUM CHLORIDE 125 MILLILITER(S): 9 INJECTION, SOLUTION INTRAVENOUS at 06:00

## 2020-02-19 RX ADMIN — Medication 100 MICROGRAM(S): at 09:02

## 2020-02-19 RX ADMIN — SODIUM CHLORIDE 3 MILLILITER(S): 9 INJECTION INTRAMUSCULAR; INTRAVENOUS; SUBCUTANEOUS at 14:16

## 2020-02-19 RX ADMIN — Medication 2 MILLIUNIT(S)/MIN: at 10:13

## 2020-02-19 RX ADMIN — Medication 1000 MILLIUNIT(S)/MIN: at 11:28

## 2020-02-19 NOTE — PATIENT PROFILE OB - PRO CIRC OB
Spoke with mom who stated pt went to [facility] last night for cough and congestion. [facility] gave Rx for prednisone and now cough is loose which I told mom what to anticipate as pt gets better. She voiced understanding and had no other concerns. no

## 2020-02-20 ENCOUNTER — TRANSCRIPTION ENCOUNTER (OUTPATIENT)
Age: 25
End: 2020-02-20

## 2020-02-20 ENCOUNTER — APPOINTMENT (OUTPATIENT)
Dept: OBGYN | Facility: CLINIC | Age: 25
End: 2020-02-20

## 2020-02-20 DIAGNOSIS — J10.1 INFLUENZA DUE TO OTHER IDENTIFIED INFLUENZA VIRUS WITH OTHER RESPIRATORY MANIFESTATIONS: ICD-10-CM

## 2020-02-20 LAB
BASOPHILS # BLD AUTO: 0.01 K/UL — SIGNIFICANT CHANGE UP (ref 0–0.2)
BASOPHILS NFR BLD AUTO: 0.1 % — SIGNIFICANT CHANGE UP (ref 0–2)
EOSINOPHIL # BLD AUTO: 0.01 K/UL — SIGNIFICANT CHANGE UP (ref 0–0.5)
EOSINOPHIL NFR BLD AUTO: 0.1 % — SIGNIFICANT CHANGE UP (ref 0–6)
HCT VFR BLD CALC: 34.5 % — SIGNIFICANT CHANGE UP (ref 34.5–45)
HGB BLD-MCNC: 11.1 G/DL — LOW (ref 11.5–15.5)
IMM GRANULOCYTES NFR BLD AUTO: 0.4 % — SIGNIFICANT CHANGE UP (ref 0–1.5)
LYMPHOCYTES # BLD AUTO: 1.35 K/UL — SIGNIFICANT CHANGE UP (ref 1–3.3)
LYMPHOCYTES # BLD AUTO: 19.6 % — SIGNIFICANT CHANGE UP (ref 13–44)
MCHC RBC-ENTMCNC: 27.6 PG — SIGNIFICANT CHANGE UP (ref 27–34)
MCHC RBC-ENTMCNC: 32.2 GM/DL — SIGNIFICANT CHANGE UP (ref 32–36)
MCV RBC AUTO: 85.8 FL — SIGNIFICANT CHANGE UP (ref 80–100)
MONOCYTES # BLD AUTO: 0.5 K/UL — SIGNIFICANT CHANGE UP (ref 0–0.9)
MONOCYTES NFR BLD AUTO: 7.3 % — SIGNIFICANT CHANGE UP (ref 2–14)
NEUTROPHILS # BLD AUTO: 4.99 K/UL — SIGNIFICANT CHANGE UP (ref 1.8–7.4)
NEUTROPHILS NFR BLD AUTO: 72.5 % — SIGNIFICANT CHANGE UP (ref 43–77)
NRBC # BLD: 0 /100 WBCS — SIGNIFICANT CHANGE UP (ref 0–0)
PLATELET # BLD AUTO: 227 K/UL — SIGNIFICANT CHANGE UP (ref 150–400)
RBC # BLD: 4.02 M/UL — SIGNIFICANT CHANGE UP (ref 3.8–5.2)
RBC # FLD: 22.5 % — HIGH (ref 10.3–14.5)
WBC # BLD: 6.89 K/UL — SIGNIFICANT CHANGE UP (ref 3.8–10.5)
WBC # FLD AUTO: 6.89 K/UL — SIGNIFICANT CHANGE UP (ref 3.8–10.5)

## 2020-02-20 RX ORDER — IBUPROFEN 200 MG
600 TABLET ORAL EVERY 6 HOURS
Refills: 0 | Status: DISCONTINUED | OUTPATIENT
Start: 2020-02-20 | End: 2020-02-21

## 2020-02-20 RX ADMIN — Medication 1 TABLET(S): at 12:12

## 2020-02-20 RX ADMIN — Medication 600 MILLIGRAM(S): at 12:12

## 2020-02-20 RX ADMIN — Medication 600 MILLIGRAM(S): at 00:20

## 2020-02-20 RX ADMIN — SODIUM CHLORIDE 3 MILLILITER(S): 9 INJECTION INTRAMUSCULAR; INTRAVENOUS; SUBCUTANEOUS at 22:26

## 2020-02-20 RX ADMIN — Medication 600 MILLIGRAM(S): at 05:44

## 2020-02-20 RX ADMIN — Medication 600 MILLIGRAM(S): at 06:23

## 2020-02-20 RX ADMIN — Medication 600 MILLIGRAM(S): at 00:56

## 2020-02-20 RX ADMIN — Medication 75 MILLIGRAM(S): at 18:32

## 2020-02-20 RX ADMIN — Medication 75 MILLIGRAM(S): at 05:44

## 2020-02-20 RX ADMIN — Medication 100 MICROGRAM(S): at 05:44

## 2020-02-20 RX ADMIN — SODIUM CHLORIDE 3 MILLILITER(S): 9 INJECTION INTRAMUSCULAR; INTRAVENOUS; SUBCUTANEOUS at 14:48

## 2020-02-20 RX ADMIN — Medication 600 MILLIGRAM(S): at 12:40

## 2020-02-20 NOTE — DISCHARGE NOTE OB - HOSPITAL COURSE
25 y/o s/p  @ 38 weeks 4 days with normal course of labor and delivery and post partum care, pt stable

## 2020-02-20 NOTE — DISCHARGE NOTE OB - CARE PROVIDER_API CALL
[facility],   [address] [place] [address]  Phone: [phone]  Fax: [phone]  Follow Up Time: 1 month

## 2020-02-20 NOTE — PROGRESS NOTE ADULT - SUBJECTIVE AND OBJECTIVE BOX
PA NOTE:  ppd#1 s/ pnsvd  flu B+ on tamiflu  droplet precaution   in the room     Patient seen at bedside resting comfortably offers no current complaints. Ambulating and voiding without difficulty.  Passing flatus and tolerating regular diet.  breastfeeding . Denies HA, CP, SOB, N/V/D, dizziness, palpitations, worsening abdominal pain, worsening vaginal bleeding, or any other concerns.      Vital Signs Last 24 Hrs  T(C): 36.4 (2020 06:04), Max: 37.1 (2020 19:55)  T(F): 97.6 (2020 06:04), Max: 98.7 (2020 19:55)  HR: 74 (2020 06:04) (74 - 100)  BP: 97/58 (2020 06:04) (97/58 - 123/64)  BP(mean): --  RR: 17 (2020 06:04) (16 - 20)  SpO2: 98% (2020 06:04) (98% - 99%)    Physical Exam:     Gen: A&Ox 3, NAD  Chest: CTA B/L  Cardiac: S1+S2; RRR  Breast: Soft, nontender, nonengorged  Abdomen: +Bs; Soft, nontender,  ND; Fundus firm below umbilicus  Gyn: mod lochia, intact/repaired  Ext: Nontender, DTRS 2+, no worsening edema                              11.1   6.89  )-----------( 227      ( 2020 06:46 )             34.5         A/P:  PPD#1 s/p     -Continue pain management  -Encourage OOB and ambulation  -Check CBC  -Continue current care  -Plan for discharge tomorrow  -d/w

## 2020-02-20 NOTE — PROGRESS NOTE ADULT - ASSESSMENT
A/P:  PPD#1 s/p   breastfeeding   in the room  flu B+ on tamiflu  droplet precautions  -Continue pain management  -Encourage OOB and ambulation  -Continue current care  -Plan for discharge tomorrow  -d/w [facility] in house ob on call

## 2020-02-20 NOTE — DISCHARGE NOTE OB - PLAN OF CARE
post partum care and pain management routine vaginal delivery care, no tub baths, douching or sex for 6weeks, ambulate daily   follow up in 5-6wks with own obgyn continue taking tamiflu twice a day for the next 3 days

## 2020-02-20 NOTE — DISCHARGE NOTE OB - PATIENT PORTAL LINK FT
You can access the FollowMyHealth Patient Portal offered by [facility] by registering at the following website: [email or web address]. By joining [employer]’s FollowMyHealth portal, you will also be able to view your health information using other applications (apps) compatible with our system.

## 2020-02-20 NOTE — DISCHARGE NOTE OB - CARE PLAN
Principal Discharge DX:	 (normal spontaneous vaginal delivery)  Goal:	post partum care and pain management  Assessment and plan of treatment:	routine vaginal delivery care, no tub baths, douching or sex for 6weeks, ambulate daily   follow up in 5-6wks with own obgyn  Secondary Diagnosis:	Influenza B  Goal:	continue taking tamiflu twice a day for the next 3 days  Assessment and plan of treatment:	continue taking tamiflu twice a day for the next 3 days

## 2020-02-20 NOTE — DISCHARGE NOTE OB - PROVIDER TOKENS
FREE:[LAST:[[facility]],PHONE:[phone]],FAX:[phone]],ADDRESS:[[address] [place] [address]],FOLLOWUP:[1 month]]

## 2020-02-20 NOTE — DISCHARGE NOTE OB - MATERIALS PROVIDED
Vaccinations/[place]  Screening Program/  Immunization Record/Breastfeeding Guide and Packet/Birth Certificate Instructions/Guide to Postpartum Care/Discharge Medication Information for Patients and Families Pocket Guide

## 2020-02-20 NOTE — LACTATION INITIAL EVALUATION - ADDITIONAL HEALTH HISTORY COMMENTS
current- + Influenza- B on droplet precautions, afebrile, on Tamiflu; hx. hypothyroid; hx. Chiari malformation; hx. anxiety

## 2020-02-20 NOTE — DISCHARGE NOTE OB - MEDICATION SUMMARY - MEDICATIONS TO TAKE
I will START or STAY ON the medications listed below when I get home from the hospital:    ibuprofen 600 mg oral tablet  -- 1 tab(s) by mouth every 6 hours, As Needed -for moderate pain   -- Do not take this drug if you are pregnant.  It is very important that you take or use this exactly as directed.  Do not skip doses or discontinue unless directed by your doctor.  May cause drowsiness or dizziness.  Obtain medical advice before taking any non-prescription drugs as some may affect the action of this medication.  Take with food or milk.    -- Indication: For moderate pain    oseltamivir 75 mg oral capsule  -- 1 cap(s) by mouth 2 times a day  -- Indication: For Influenza B    ferrous sulfate 325 mg (65 mg elemental iron) oral tablet  -- 1 tab(s) by mouth once a day   -- Check with your doctor before becoming pregnant.  Do not chew, break, or crush.  May discolor urine or feces.    -- Indication: For anemia    Prenatal Multivitamins with Folic Acid 1 mg oral tablet  -- 1 tab(s) by mouth once a day  -- Indication: For Supplementation    Cara-Colace 50 mg-8.6 mg oral tablet  -- 2 tab(s) by mouth once a day (at bedtime)   -- Medication should be taken with plenty of water.    -- Indication: For Stool softener

## 2020-02-21 VITALS
RESPIRATION RATE: 17 BRPM | SYSTOLIC BLOOD PRESSURE: 92 MMHG | TEMPERATURE: 98 F | OXYGEN SATURATION: 99 % | HEART RATE: 68 BPM | DIASTOLIC BLOOD PRESSURE: 58 MMHG

## 2020-02-21 DIAGNOSIS — Z71.89 OTHER SPECIFIED COUNSELING: ICD-10-CM

## 2020-02-21 PROCEDURE — 85384 FIBRINOGEN ACTIVITY: CPT

## 2020-02-21 PROCEDURE — 59050 FETAL MONITOR W/REPORT: CPT

## 2020-02-21 PROCEDURE — 88307 TISSUE EXAM BY PATHOLOGIST: CPT

## 2020-02-21 PROCEDURE — 59025 FETAL NON-STRESS TEST: CPT

## 2020-02-21 PROCEDURE — 86901 BLOOD TYPING SEROLOGIC RH(D): CPT

## 2020-02-21 PROCEDURE — 86850 RBC ANTIBODY SCREEN: CPT

## 2020-02-21 PROCEDURE — 85027 COMPLETE CBC AUTOMATED: CPT

## 2020-02-21 PROCEDURE — 85730 THROMBOPLASTIN TIME PARTIAL: CPT

## 2020-02-21 PROCEDURE — 36415 COLL VENOUS BLD VENIPUNCTURE: CPT

## 2020-02-21 PROCEDURE — 85610 PROTHROMBIN TIME: CPT

## 2020-02-21 PROCEDURE — 86780 TREPONEMA PALLIDUM: CPT

## 2020-02-21 PROCEDURE — 87631 RESP VIRUS 3-5 TARGETS: CPT

## 2020-02-21 PROCEDURE — G0463: CPT

## 2020-02-21 PROCEDURE — 86900 BLOOD TYPING SEROLOGIC ABO: CPT

## 2020-02-21 RX ORDER — SENNOSIDES/DOCUSATE SODIUM 8.6MG-50MG
2 TABLET ORAL
Qty: 60 | Refills: 0
Start: 2020-02-21 | End: 2020-03-21

## 2020-02-21 RX ORDER — IBUPROFEN 200 MG
1 TABLET ORAL
Qty: 40 | Refills: 0
Start: 2020-02-21 | End: 2020-03-01

## 2020-02-21 RX ORDER — FERROUS SULFATE 325(65) MG
1 TABLET ORAL
Qty: 30 | Refills: 0
Start: 2020-02-21 | End: 2020-03-21

## 2020-02-21 RX ADMIN — SODIUM CHLORIDE 3 MILLILITER(S): 9 INJECTION INTRAMUSCULAR; INTRAVENOUS; SUBCUTANEOUS at 06:01

## 2020-02-21 RX ADMIN — Medication 600 MILLIGRAM(S): at 06:34

## 2020-02-21 RX ADMIN — Medication 100 MICROGRAM(S): at 06:03

## 2020-02-21 RX ADMIN — Medication 600 MILLIGRAM(S): at 06:03

## 2020-02-21 RX ADMIN — Medication 75 MILLIGRAM(S): at 06:03

## 2020-02-21 NOTE — PROGRESS NOTE ADULT - PROBLEM SELECTOR PLAN 2
-Discharge home with instructions  -Discharge with Tamiflu   -Follow up in office in 5-6 weeks for postpartum visit  -Breastfeeding encouraged   -d/w Dr. Norris, attending on call

## 2020-02-21 NOTE — PROGRESS NOTE ADULT - SUBJECTIVE AND OBJECTIVE BOX
Patient seen at bedside resting comfortably offers no current complaints.  Ambulating and voiding without difficulty.  Passing flatus and tolerating regular diet.  Pt only breastfeeding.  Denies HA, CP, SOB, N/V/D, dizziness, palpitations, worsening abdominal pain, worsening vaginal bleeding, or any other concerns.      Vital Signs Last 24 Hrs  T(C): 36.9 (21 Feb 2020 05:38), Max: 36.9 (21 Feb 2020 05:38)  T(F): 98.5 (21 Feb 2020 05:38), Max: 98.5 (21 Feb 2020 05:38)  HR: 68 (21 Feb 2020 05:38) (68 - 75)  BP: 92/58 (21 Feb 2020 05:38) (92/58 - 99/64)  BP(mean): --  RR: 17 (21 Feb 2020 05:38) (17 - 17)  SpO2: 99% (21 Feb 2020 05:38) (98% - 99%)    Physical Exam:     Gen: A&Ox 3, NAD  Chest: CTA B/L  Cardiac: S1,S2; RRR  Breast: Soft, nontender, nonengorged  Abdomen: +BS; Soft, nontender, ND; Fundus firm below umbilicus  Gyn: Min lochia  Ext: Nontender, no worsening edema                          11.1   6.89  )-----------( 227      ( 20 Feb 2020 06:46 )             34.5

## 2020-02-21 NOTE — PROGRESS NOTE ADULT - ASSESSMENT
A/P:  PPD#2 s/p  @ 38 weeks 4 days, pt Flu B positive; pt stable  -Discharge home with instructions  -Discharge with Tamiflu   -Follow up in office in 5-6 weeks for postpartum visit  -Breastfeeding encouraged   -d/w Dr. Norris, attending on call

## 2020-02-27 LAB — SURGICAL PATHOLOGY STUDY: SIGNIFICANT CHANGE UP

## 2020-03-24 ENCOUNTER — APPOINTMENT (OUTPATIENT)
Dept: OBGYN | Facility: CLINIC | Age: 25
End: 2020-03-24
Payer: MEDICAID

## 2020-03-24 ENCOUNTER — NON-APPOINTMENT (OUTPATIENT)
Age: 25
End: 2020-03-24

## 2020-03-24 ENCOUNTER — OUTPATIENT (OUTPATIENT)
Dept: OUTPATIENT SERVICES | Facility: HOSPITAL | Age: 25
LOS: 1 days | End: 2020-03-24
Payer: MEDICAID

## 2020-03-24 VITALS
BODY MASS INDEX: 30.23 KG/M2 | SYSTOLIC BLOOD PRESSURE: 95 MMHG | HEIGHT: 60 IN | TEMPERATURE: 98.6 F | WEIGHT: 154 LBS | DIASTOLIC BLOOD PRESSURE: 63 MMHG | HEART RATE: 60 BPM | OXYGEN SATURATION: 97 %

## 2020-03-24 DIAGNOSIS — M50.20 OTHER CERVICAL DISC DISPLACEMENT, UNSPECIFIED CERVICAL REGION: ICD-10-CM

## 2020-03-24 DIAGNOSIS — E03.9 ENDOCRINE, NUTRITIONAL AND METABOLIC DISEASES COMPLICATING PREGNANCY, UNSPECIFIED TRIMESTER: ICD-10-CM

## 2020-03-24 DIAGNOSIS — Z34.00 ENCOUNTER FOR SUPERVISION OF NORMAL FIRST PREGNANCY, UNSPECIFIED TRIMESTER: ICD-10-CM

## 2020-03-24 DIAGNOSIS — R25.3 FASCICULATION: ICD-10-CM

## 2020-03-24 DIAGNOSIS — O99.019 ANEMIA COMPLICATING PREGNANCY, UNSPECIFIED TRIMESTER: ICD-10-CM

## 2020-03-24 DIAGNOSIS — Z23 ENCOUNTER FOR IMMUNIZATION: ICD-10-CM

## 2020-03-24 DIAGNOSIS — O99.280 ENDOCRINE, NUTRITIONAL AND METABOLIC DISEASES COMPLICATING PREGNANCY, UNSPECIFIED TRIMESTER: ICD-10-CM

## 2020-03-24 DIAGNOSIS — Z34.03 ENCOUNTER FOR SUPERVISION OF NORMAL FIRST PREGNANCY, THIRD TRIMESTER: ICD-10-CM

## 2020-03-24 LAB
T3FREE SERPL-MCNC: 2.94 PG/ML — SIGNIFICANT CHANGE UP (ref 1.8–4.6)
T4 FREE SERPL-MCNC: 1 NG/DL — SIGNIFICANT CHANGE UP (ref 0.9–1.8)
TSH SERPL-MCNC: 17.6 UIU/ML — HIGH (ref 0.27–4.2)

## 2020-03-24 PROCEDURE — 84481 FREE ASSAY (FT-3): CPT

## 2020-03-24 PROCEDURE — 84443 ASSAY THYROID STIM HORMONE: CPT

## 2020-03-24 PROCEDURE — 99213 OFFICE O/P EST LOW 20 MIN: CPT | Mod: TH

## 2020-03-24 PROCEDURE — 84439 ASSAY OF FREE THYROXINE: CPT

## 2020-03-24 PROCEDURE — G0463: CPT

## 2020-03-24 PROCEDURE — 81025 URINE PREGNANCY TEST: CPT

## 2020-03-24 RX ORDER — LEVOTHYROXINE SODIUM 0.1 MG/1
100 TABLET ORAL DAILY
Qty: 30 | Refills: 3 | Status: DISCONTINUED | COMMUNITY
Start: 2020-01-21 | End: 2020-03-24

## 2020-03-24 RX ORDER — FERROUS SULFATE 325(65) MG
325 (65 FE) TABLET ORAL TWICE DAILY
Qty: 120 | Refills: 3 | Status: DISCONTINUED | COMMUNITY
Start: 2019-10-11 | End: 2020-03-24

## 2020-03-24 NOTE — HISTORY OF PRESENT ILLNESS
[Postpartum Follow Up] : postpartum follow up [Last Pap Date: ___] : Last Pap Date: [unfilled] [Delivery Date: ___] : on [unfilled] [] : delivered by vaginal delivery [Male] : Delivery History: baby boy [Wt. ___] : weighing [unfilled] [Rhogam] : Rhogam was not administered [Rubella Vaccine] : Rubella vaccine was not administered [Pertussis Vaccine] : Pertussis vaccine was not administered [BTL] : no tubal ligation [Breastfeeding] : currently nursing [Discharge HCT: ___] : hematocrit level was [unfilled] [Discharge HGB: ___] : hemoglobin level was [unfilled] [Resumed Menses] : has not resumed her menses [Resumed [place]] : has not resumed intercourse [Intended Contraception] : Intended Contraception: [Back to Normal] : is back to normal in size [Cervix Sample Taken] : cervical sample not taken for a Pap smear [Examination Of The Breasts] : breasts are normal [Awake] : awake [Alert] : alert [Acute Distress] : no acute distress [Mass] : no breast mass [Nipple Discharge] : no nipple discharge [Axillary LAD] : no axillary lymphadenopathy [Soft] : soft [Tender] : non tender [Distended] : not distended [Oriented x3] : oriented to person, place, and time [Depressed Mood] : not depressed [Flat Affect] : affect not flat [Labia Majora] : labia major [Labia Minora] : labia minora [Normal] : clitoris [Cystocele] : no cystocele [Rectocele] : no rectocele [Discharge] : no discharge [RRR, No Murmurs] : RRR, no murmurs [CTAB] : CTAB [Doing Well] : is doing well [No Sign of Infection] : is showing no signs of infection [None] : None [FreeTextEntry8] : EPDS~0 / Breast & Bottle feeding / Not sexually active since delivery / Hypothyroid - stopped treatment after delivery [de-identified] : Postpartum Exam / Breast feeding / Contraception counseling / Hypothyroid [de-identified] : Requesting Depo today, will return to office if decides to switch to alternate method subsequently, advised to contine PNV daily; Hypothyroid - TSH/FT4 sent today, recommended scheduling initial appointment with Endocrinology at [facility]; RTO 3 months for annual Gyn exam [Complications:___] : no complications [BF with Difficulty] : nursing without difficulty [Abdominal Pain] : no abdominal pain [Back Pain] : no back pain [Breast Pain] : no breast pain [BreastFeeding Problems] : no breastfeeding problems [Chest Pain] : no chest pain [Cracked Nipples] : no cracked nipples

## 2020-03-25 DIAGNOSIS — E03.9 HYPOTHYROIDISM, UNSPECIFIED: ICD-10-CM

## 2020-03-25 DIAGNOSIS — Z78.9 OTHER SPECIFIED HEALTH STATUS: ICD-10-CM

## 2020-03-25 DIAGNOSIS — Z30.013 ENCOUNTER FOR INITIAL PRESCRIPTION OF INJECTABLE CONTRACEPTIVE: ICD-10-CM

## 2020-06-16 ENCOUNTER — APPOINTMENT (OUTPATIENT)
Dept: OBGYN | Facility: CLINIC | Age: 25
End: 2020-06-16

## 2020-06-18 ENCOUNTER — OUTPATIENT (OUTPATIENT)
Dept: OUTPATIENT SERVICES | Facility: HOSPITAL | Age: 25
LOS: 1 days | End: 2020-06-18
Payer: MEDICAID

## 2020-06-18 ENCOUNTER — APPOINTMENT (OUTPATIENT)
Dept: OBGYN | Facility: CLINIC | Age: 25
End: 2020-06-18
Payer: MEDICAID

## 2020-06-18 VITALS
DIASTOLIC BLOOD PRESSURE: 71 MMHG | OXYGEN SATURATION: 98 % | HEIGHT: 60 IN | SYSTOLIC BLOOD PRESSURE: 106 MMHG | WEIGHT: 150 LBS | HEART RATE: 82 BPM | BODY MASS INDEX: 29.45 KG/M2 | TEMPERATURE: 98.2 F

## 2020-06-18 DIAGNOSIS — Z34.00 ENCOUNTER FOR SUPERVISION OF NORMAL FIRST PREGNANCY, UNSPECIFIED TRIMESTER: ICD-10-CM

## 2020-06-18 DIAGNOSIS — B96.89 ACUTE VAGINITIS: ICD-10-CM

## 2020-06-18 DIAGNOSIS — N76.0 ACUTE VAGINITIS: ICD-10-CM

## 2020-06-18 LAB
APPEARANCE UR: CLEAR — SIGNIFICANT CHANGE UP
BACTERIA # UR AUTO: NEGATIVE — SIGNIFICANT CHANGE UP
BILIRUB UR-MCNC: NEGATIVE — SIGNIFICANT CHANGE UP
COLOR SPEC: YELLOW — SIGNIFICANT CHANGE UP
DIFF PNL FLD: ABNORMAL
EPI CELLS # UR: 4 /HPF — SIGNIFICANT CHANGE UP (ref 0–5)
GLUCOSE UR QL: NEGATIVE — SIGNIFICANT CHANGE UP
HYALINE CASTS # UR AUTO: 0 /LPF — SIGNIFICANT CHANGE UP (ref 0–7)
KETONES UR-MCNC: SIGNIFICANT CHANGE UP
LEUKOCYTE ESTERASE UR-ACNC: NEGATIVE — SIGNIFICANT CHANGE UP
NITRITE UR-MCNC: NEGATIVE — SIGNIFICANT CHANGE UP
PH UR: 6 — SIGNIFICANT CHANGE UP (ref 5–8)
PROT UR-MCNC: ABNORMAL
RBC CASTS # UR COMP ASSIST: 7 /HPF — HIGH (ref 0–4)
SP GR SPEC: 1.05 — HIGH (ref 1.01–1.02)
UROBILINOGEN FLD QL: SIGNIFICANT CHANGE UP
WBC UR QL: 6 /HPF — HIGH (ref 0–5)

## 2020-06-18 PROCEDURE — 81001 URINALYSIS AUTO W/SCOPE: CPT

## 2020-06-18 PROCEDURE — 99213 OFFICE O/P EST LOW 20 MIN: CPT

## 2020-06-18 PROCEDURE — 87086 URINE CULTURE/COLONY COUNT: CPT

## 2020-06-18 PROCEDURE — 87800 DETECT AGNT MULT DNA DIREC: CPT

## 2020-06-18 PROCEDURE — G0463: CPT

## 2020-06-19 DIAGNOSIS — N76.0 ACUTE VAGINITIS: ICD-10-CM

## 2020-06-19 LAB
CANDIDA AB TITR SER: SIGNIFICANT CHANGE UP
CULTURE RESULTS: SIGNIFICANT CHANGE UP
G VAGINALIS DNA SPEC QL NAA+PROBE: SIGNIFICANT CHANGE UP
SPECIMEN SOURCE: SIGNIFICANT CHANGE UP
T VAGINALIS SPEC QL WET PREP: SIGNIFICANT CHANGE UP

## 2020-07-07 ENCOUNTER — APPOINTMENT (OUTPATIENT)
Dept: ENDOCRINOLOGY | Facility: CLINIC | Age: 25
End: 2020-07-07

## 2020-11-11 ENCOUNTER — APPOINTMENT (OUTPATIENT)
Dept: ENDOCRINOLOGY | Facility: CLINIC | Age: 25
End: 2020-11-11

## 2020-11-11 DIAGNOSIS — Z30.013 ENCOUNTER FOR INITIAL PRESCRIPTION OF INJECTABLE CONTRACEPTIVE: ICD-10-CM

## 2020-12-21 ENCOUNTER — OUTPATIENT (OUTPATIENT)
Dept: OUTPATIENT SERVICES | Facility: HOSPITAL | Age: 25
LOS: 1 days | End: 2020-12-21
Payer: MEDICAID

## 2020-12-21 ENCOUNTER — APPOINTMENT (OUTPATIENT)
Dept: OBGYN | Facility: CLINIC | Age: 25
End: 2020-12-21
Payer: MEDICAID

## 2020-12-21 VITALS
WEIGHT: 151 LBS | TEMPERATURE: 98.1 F | SYSTOLIC BLOOD PRESSURE: 102 MMHG | HEIGHT: 60 IN | OXYGEN SATURATION: 98 % | HEART RATE: 73 BPM | RESPIRATION RATE: 16 BRPM | DIASTOLIC BLOOD PRESSURE: 68 MMHG | BODY MASS INDEX: 29.64 KG/M2

## 2020-12-21 DIAGNOSIS — Z00.00 ENCOUNTER FOR GENERAL ADULT MEDICAL EXAMINATION WITHOUT ABNORMAL FINDINGS: ICD-10-CM

## 2020-12-21 PROCEDURE — 99213 OFFICE O/P EST LOW 20 MIN: CPT

## 2020-12-21 PROCEDURE — G0463: CPT

## 2020-12-21 NOTE — HISTORY OF PRESENT ILLNESS
[FreeTextEntry1] : \par LMP 2020\par Currently taking OCP, here for bc counseling b/c she keeps forgetting to take the pills.\par Tried Depoprovera, Nuvaring, Ortho Evra patch, IUD with side effects.\par  [TextBox_4] : birth control counseling

## 2020-12-22 DIAGNOSIS — Z30.09 ENCOUNTER FOR OTHER GENERAL COUNSELING AND ADVICE ON CONTRACEPTION: ICD-10-CM

## 2020-12-23 PROBLEM — N76.0 BACTERIAL VAGINOSIS: Status: RESOLVED | Noted: 2020-06-18 | Resolved: 2020-12-23

## 2020-12-28 ENCOUNTER — LABORATORY RESULT (OUTPATIENT)
Age: 25
End: 2020-12-28

## 2020-12-28 ENCOUNTER — APPOINTMENT (OUTPATIENT)
Dept: DERMATOLOGY | Facility: CLINIC | Age: 25
End: 2020-12-28
Payer: MEDICAID

## 2020-12-28 VITALS — HEIGHT: 60 IN | WEIGHT: 150 LBS | BODY MASS INDEX: 29.45 KG/M2

## 2020-12-28 PROCEDURE — 99072 ADDL SUPL MATRL&STAF TM PHE: CPT

## 2020-12-28 PROCEDURE — 11104 PUNCH BX SKIN SINGLE LESION: CPT

## 2020-12-28 PROCEDURE — 99203 OFFICE O/P NEW LOW 30 MIN: CPT | Mod: 25

## 2020-12-31 ENCOUNTER — NON-APPOINTMENT (OUTPATIENT)
Age: 25
End: 2020-12-31

## 2021-01-11 ENCOUNTER — APPOINTMENT (OUTPATIENT)
Dept: DERMATOLOGY | Facility: CLINIC | Age: 26
End: 2021-01-11
Payer: MEDICAID

## 2021-01-11 PROCEDURE — 99072 ADDL SUPL MATRL&STAF TM PHE: CPT

## 2021-01-11 PROCEDURE — 99214 OFFICE O/P EST MOD 30 MIN: CPT

## 2021-01-13 ENCOUNTER — NON-APPOINTMENT (OUTPATIENT)
Age: 26
End: 2021-01-13

## 2021-01-13 LAB
HCV AB SER QL: NONREACTIVE
HCV S/CO RATIO: 0.1 S/CO

## 2021-02-23 ENCOUNTER — EMERGENCY (EMERGENCY)
Facility: HOSPITAL | Age: 26
LOS: 1 days | Discharge: HOME | End: 2021-02-23
Attending: EMERGENCY MEDICINE
Payer: MEDICAID

## 2021-02-23 VITALS
TEMPERATURE: 98 F | HEART RATE: 79 BPM | HEIGHT: 60 IN | SYSTOLIC BLOOD PRESSURE: 106 MMHG | RESPIRATION RATE: 18 BRPM | WEIGHT: 160.06 LBS | DIASTOLIC BLOOD PRESSURE: 78 MMHG | OXYGEN SATURATION: 100 %

## 2021-02-23 VITALS
HEART RATE: 69 BPM | SYSTOLIC BLOOD PRESSURE: 103 MMHG | RESPIRATION RATE: 17 BRPM | OXYGEN SATURATION: 99 % | TEMPERATURE: 98 F | DIASTOLIC BLOOD PRESSURE: 55 MMHG

## 2021-02-23 PROCEDURE — U0005: CPT

## 2021-02-23 PROCEDURE — 96375 TX/PRO/DX INJ NEW DRUG ADDON: CPT

## 2021-02-23 PROCEDURE — 96374 THER/PROPH/DIAG INJ IV PUSH: CPT

## 2021-02-23 PROCEDURE — 99284 EMERGENCY DEPT VISIT MOD MDM: CPT | Mod: 25

## 2021-02-23 PROCEDURE — 99284 EMERGENCY DEPT VISIT MOD MDM: CPT

## 2021-02-23 PROCEDURE — U0003: CPT

## 2021-02-23 RX ORDER — DIPHENHYDRAMINE HCL 50 MG
25 CAPSULE ORAL ONCE
Refills: 0 | Status: COMPLETED | OUTPATIENT
Start: 2021-02-23 | End: 2021-02-23

## 2021-02-23 RX ORDER — ACETAMINOPHEN 500 MG
975 TABLET ORAL ONCE
Refills: 0 | Status: COMPLETED | OUTPATIENT
Start: 2021-02-23 | End: 2021-02-23

## 2021-02-23 RX ORDER — METOCLOPRAMIDE HCL 10 MG
10 TABLET ORAL ONCE
Refills: 0 | Status: COMPLETED | OUTPATIENT
Start: 2021-02-23 | End: 2021-02-23

## 2021-02-23 RX ORDER — SODIUM CHLORIDE 9 MG/ML
1000 INJECTION INTRAMUSCULAR; INTRAVENOUS; SUBCUTANEOUS ONCE
Refills: 0 | Status: COMPLETED | OUTPATIENT
Start: 2021-02-23 | End: 2021-02-23

## 2021-02-23 RX ADMIN — Medication 25 MILLIGRAM(S): at 10:36

## 2021-02-23 RX ADMIN — Medication 975 MILLIGRAM(S): at 12:34

## 2021-02-23 RX ADMIN — SODIUM CHLORIDE 1000 MILLILITER(S): 9 INJECTION INTRAMUSCULAR; INTRAVENOUS; SUBCUTANEOUS at 10:37

## 2021-02-23 RX ADMIN — Medication 104 MILLIGRAM(S): at 10:36

## 2021-02-23 NOTE — ED ADULT NURSE NOTE - OBJECTIVE STATEMENT
presents with c/o migraine headache onset 3 weeks , worsen in past 4 days, intermittent nose bleed, denies numbness light sensitivity, dizziness, fever or chills

## 2021-02-23 NOTE — ED PROVIDER NOTE - PATIENT PORTAL LINK FT
You can access the FollowMyHealth Patient Portal offered by [facility] by registering at the following website: [email or web address]. By joining [employer]’s FollowMyHealth portal, you will also be able to view your health information using other applications (apps) compatible with our system.

## 2021-02-23 NOTE — ED ADULT NURSE NOTE - NSIMPLEMENTINTERV_GEN_ALL_ED
Implemented All Universal Safety Interventions:  [place] to call system. Call bell, personal items and telephone within reach. Instruct patient to call for assistance. Room bathroom lighting operational. Non-slip footwear when patient is off stretcher. Physically safe environment: no spills, clutter or unnecessary equipment. Stretcher in lowest position, wheels locked, appropriate side rails in place.

## 2021-02-23 NOTE — ED PROVIDER NOTE - NSFOLLOWUPINSTRUCTIONS_ED_ALL_ED_FT
Acute Headache    WHAT YOU NEED TO KNOW:    What is an acute headache? An acute headache is pain or discomfort that starts suddenly and gets worse quickly. You may have an acute headache only when you feel stress or eat certain foods. Other acute headache pain can happen every day, and sometimes several times a day.     What are the most common types of acute headache?   •Tension headache is the most common type of headache. These headaches typically occur in the late afternoon and go away by evening. The pain is usually mild or moderate. You may have problems tolerating bright light or loud noise. The pain is usually across the forehead or in the back of the head, often only on one side. These headaches may occur every day.       •Migraine headaches cause moderate or severe pain. The headache generally lasts from 1 to 3 days and tends to come back. Pain is usually on only one side, but it may change sides. Migraines often occur in the temple, the back of the head, or behind the eye. The pain may throb or be sharp and steady.      •A migraine with aura means you see or feel something before a migraine. You may see a small spot surrounded by bright zigzag lines. Other signs or symptoms may follow the aura.       •Cluster headache pain is usually only on one side. It often causes severe pain, and can last for 30 minutes to 2 hours. These headaches may occur 1 or 2 times each day, more often at night. The pain may wake you.       What causes acute headaches? The cause of your headache may not be known. The following conditions can trigger a headache:   •Stress or tension, hours or even days after stressful events      •Fatigue, a lack of sleep or changes in your usual sleep pattern, or a nap during the day      •Menstruation, especially after pregnancy, or use of birth control pills or hormone replacement therapy      •Food such as cured meats, artificial sweeteners, alcohol, dark chocolate, and MSG       •Suddenly not having caffeine if you usually have larger amounts      •A medical problem, such as an infection, tooth pain, neck or sinus pain, thyroid problems, or a tumor      •A head injury      How is the type of acute headache diagnosed and treated? Your healthcare provider will ask you to describe your pain and rate it on a scale from 1 to 10. Tell the provider how often you have headaches and how long they last. Also describe any other symptoms you have along with headaches, such as dizziness or blurred vision.   •Medicines may be given to manage or prevent headaches. The medicine will depend on the type of acute headache you have. Do not wait until the pain is severe before you take your medicine. You may be able to take over-the-counter pain medicines as needed. Examples include NSAIDs and acetaminophen. Ask your healthcare provider which medicine is right for you. Ask how much to take and when to take it. Follow directions. These medicines can cause stomach bleeding or kidney or liver damage if not taken correctly.      •Biofeedback may be used to help you manage stress. Electrodes (wires) are placed on your body and attached to a monitor. You will learn how to change stress reactions. For example, you learn to slow your heart rate when you become upset.       •Cognitive behavior therapy, or stress management, may be used with other therapies to prevent headaches.      What can I do to manage my symptoms?   •Apply heat or ice on the headache area. Use a heat or ice pack. For an ice pack, you can also put crushed ice in a plastic bag. Cover the pack or bag with a towel before you apply it to your skin. Ice and heat both help decrease pain, and heat also helps decrease muscle spasms. Apply heat for 20 to 30 minutes every 2 hours. Apply ice for 15 to 20 minutes every hour. Apply heat or ice for as long and for as many days as directed. You may alternate heat and ice.      •Relax your muscles. Lie down in a comfortable position and close your eyes. Relax your muscles slowly. Start at your toes and work your way up your body.      •Keep a record of your headaches. Write down when your headaches start and stop. Include your symptoms and what you were doing when the headache began. Record what you ate or drank for 24 hours before the headache started. Describe the pain and where it hurts. Keep track of what you did to treat your headache and if it worked.       What can I do to prevent an acute headache?   •Avoid anything that triggers an acute headache. Examples include exposure to chemicals, going to high altitude, or not getting enough sleep. Create a regular sleep routine. Go to sleep at the same time and wake up at the same time each day. Do not use electronic devices before bedtime. These may trigger a headache or prevent you from sleeping well.      •Do not smoke. Nicotine and other chemicals in cigarettes and cigars can trigger an acute headache or make it worse. Ask your healthcare provider for information if you currently smoke and need help to quit. E-cigarettes or smokeless tobacco still contain nicotine. Talk to your healthcare provider before you use these products.       •Limit alcohol as directed. Alcohol can trigger an acute headache or make it worse. If you have cluster headaches, do not drink alcohol during an episode. For other types of headaches, ask your healthcare provider if it is safe for you to drink alcohol. Ask how much is safe for you to drink, and how often.      •Exercise as directed. Exercise can reduce tension and help with headache pain. Aim for 30 minutes of physical activity on most days of the week. Your healthcare provider can help you create an exercise plan.      •Eat a variety of healthy foods. Healthy foods include fruits, vegetables, low-fat dairy products, lean meats, fish, whole grains, and cooked beans. Your healthcare provider or dietitian can help you create meals plans if you need to avoid foods that trigger headaches.      When should I seek immediate care?   •You have severe pain.      •You have numbness or weakness on one side of your face or body.      •You have a headache that occurs after a blow to the head, a fall, or other trauma.       •You have a headache, are forgetful or confused, or have trouble speaking.      •You have a headache, stiff neck, and a fever.      When should I contact my healthcare provider?   •You have a constant headache and are vomiting.      •You have a headache each day that does not get better, even after treatment.      •You have changes in your headaches, or new symptoms that occur when you have a headache.      •You have questions or concerns about your condition or care.      CARE AGREEMENT:    You have the right to help plan your care. Learn about your health condition and how it may be treated. Discuss treatment options with your healthcare providers to decide what care you want to receive. You always have the right to refuse treatment.

## 2021-02-23 NOTE — ED PROVIDER NOTE - ENMT, MLM
Airway patent, Nasal mucosa clear. Mouth with normal mucosa. Throat has no vesicles, no oropharyngeal exudates and uvula is midline. Nares are clear. Neck supple.
no

## 2021-02-23 NOTE — ED PROVIDER NOTE - OBJECTIVE STATEMENT
24 y/o F pt presents to the ED with complaints of 4 days of migraine headache. Patient notes intermittent nose bleed and runny nose. Denies fever, chills or any other symptoms. Patient is currently breast feeding.

## 2021-02-23 NOTE — ED ADULT NURSE NOTE - NS ED NOTE ABUSE SUSPICION NEGLECT YN
Anesthesia Volume In Cc: 0.5 Post-Care Instructions: I reviewed with the patient in detail post-care instructions. Patient is to keep the biopsy site dry overnight, and then apply petroleum jelly twice daily until healed. Billing Type: Third-Party Bill Render In Bullet Format When Appropriate: No Hemostasis: Drysol Silver Nitrate Text: The wound bed was treated with silver nitrate after the biopsy was performed. Depth Of Biopsy: dermis Render Post-Care Instructions In Note?: yes X Size Of Lesion In Cm: 0 Anesthesia Type: 1% lidocaine with epinephrine Cryotherapy Text: The wound bed was treated with cryotherapy after the biopsy was performed. Type Of Destruction Used: Curettage Consent: Verbal consent was obtained and risks were reviewed including but not limited to scarring, infection, bleeding, scabbing, incomplete removal, nerve damage and allergy to anesthesia. Notification Instructions: Patient will be notified of biopsy results either via the portal, phone call, or letter. However, patient instructed to call the office if not contacted within 2 weeks. Detail Level: Detailed Curettage Text: The wound bed was treated with curettage after the biopsy was performed. Electrodesiccation Text: The wound bed was treated with electrodesiccation after the biopsy was performed. Biopsy Type: H and E Dressing: bandage Biopsy Method: Dermablade Electrodesiccation And Curettage Text: The wound bed was treated with electrodesiccation and curettage after the biopsy was performed. Wound Care: Petrolatum No

## 2021-02-24 LAB — SARS-COV-2 RNA SPEC QL NAA+PROBE: SIGNIFICANT CHANGE UP

## 2021-02-26 ENCOUNTER — APPOINTMENT (OUTPATIENT)
Dept: NEUROLOGY | Facility: CLINIC | Age: 26
End: 2021-02-26
Payer: MEDICAID

## 2021-02-26 VITALS
HEART RATE: 79 BPM | WEIGHT: 150 LBS | BODY MASS INDEX: 29.45 KG/M2 | OXYGEN SATURATION: 98 % | DIASTOLIC BLOOD PRESSURE: 64 MMHG | TEMPERATURE: 97.9 F | HEIGHT: 60 IN | SYSTOLIC BLOOD PRESSURE: 104 MMHG

## 2021-02-26 PROCEDURE — 99214 OFFICE O/P EST MOD 30 MIN: CPT

## 2021-02-26 PROCEDURE — 99072 ADDL SUPL MATRL&STAF TM PHE: CPT

## 2021-02-26 NOTE — PHYSICAL EXAM
[General Appearance - Alert] : alert [General Appearance - In No Acute Distress] : in no acute distress [Person] : oriented to person [Place] : oriented to place [Time] : oriented to time [Concentration Intact] : normal concentrating ability [Naming Objects] : no difficulty naming common objects [Fluency] : fluency intact [Comprehension] : comprehension intact [Vocabulary] : adequate range of vocabulary [Cranial Nerves Optic (II)] : visual acuity intact bilaterally,  visual fields full to confrontation, pupils equal round and reactive to light [Cranial Nerves Oculomotor (III)] : extraocular motion intact [Cranial Nerves Trigeminal (V)] : facial sensation intact symmetrically [Cranial Nerves Facial (VII)] : face symmetrical [Motor Tone] : muscle tone was normal in all four extremities [Motor Strength] : muscle strength was normal in all four extremities [Involuntary Movements] : no involuntary movements were seen [Sensation Tactile Decrease] : light touch was intact [Abnormal Walk] : normal gait [Balance] : balance was intact

## 2021-02-26 NOTE — ASSESSMENT
[FreeTextEntry1] : New continuos headache with nausea in patient to is breastfeeding.  Will get MRI brain to r/o intracranial lesions.  Will start Medrol pack to stop the headache and will reassess for need of further treatment afterwards.

## 2021-02-26 NOTE — HISTORY OF PRESENT ILLNESS
[FreeTextEntry1] : The patient was initially seen for eye twitching which has resolved, now she is here for new headache in bl frontal and temporal area without photo or phonophobia but with nausea.  No aura.  The patient has been continues for 3 weeks.\par \par No new medications, no OCP.  She is currently breastfeeding, baby 1 yr old.

## 2021-02-26 NOTE — DATA REVIEWED
PHYSICIAN NEXT STEPS:  Call the Patient    CHIEF COMPLAINT:  Chief Complaint/Protocol Used: Medication Question Call  Onset: 2 days ago      ASSESSMENT:  ? Onset: 2 days ago  ? Normal True  ? Normal, no trouble breathing True  ? Symptoms: Elevated blood sugar   -------------------------------------------------------    DISPOSITION:  Disposition Recommendation: Call PCP within 24 Hours  Questions that led to disposition:  ? Caller requesting a NON-URGENT new prescription or refill and triager unable to refill per unit policy  Patient Directed To: Unspecified  Patient Intended Action: Unspecified    CALL NOTES:  04/29/2020 at 1:09 PM by Tiffany Gamez  ? Patient requested for prescription for a temporary supply of of his diabetic medication until his mail order arrives, roman sent to a [facility] pharmacy. Message sent to the provider.     DISPOSITION OVERRIDE/PROVIDER CONSULT:  Disposition Override: N/A  Override Source: Unspecified  Consulted with PCP: No  Consulted with On-Call Physician: No    CALLER CONTACT INFO:  Name: Serjio Florian (Self)  Phone 1: [phone] (Home Phone)      ENCOUNTER STARTED:  04/29/20 01:06:17 PM  ENCOUNTER ASSIGNED TO/CLOSED BY:  Tiffany Gamez @ 04/29/20 01:11:08 PM      -------------------------------------------------------    CARE ADVICE given per Medication Question Call guideline.  CALL PCP WITHIN 24 HOURS: You need to discuss this with your doctor within the next 24 hours.   * IF OFFICE WILL BE OPEN: Call the office when it opens tomorrow morning.  * IF OFFICE WILL BE CLOSED: I'll page him now. EXCEPTION: from 9 pm to 9 am. Since this isn't urgent, we'll hold the page until morning.      UNDERSTANDS CARE ADVICE: Yes    AGREES WITH CARE ADVICE: No    WILL FOLLOW CARE ADVICE: No    -------------------------------------------------------  
[de-identified] : Ua -naomi\par Hep C -naomi\par derm path noted

## 2021-03-06 ENCOUNTER — RESULT REVIEW (OUTPATIENT)
Age: 26
End: 2021-03-06

## 2021-03-06 ENCOUNTER — OUTPATIENT (OUTPATIENT)
Dept: OUTPATIENT SERVICES | Facility: HOSPITAL | Age: 26
LOS: 1 days | End: 2021-03-06
Payer: MEDICAID

## 2021-03-06 ENCOUNTER — APPOINTMENT (OUTPATIENT)
Dept: MRI IMAGING | Facility: HOSPITAL | Age: 26
End: 2021-03-06
Payer: MEDICAID

## 2021-03-06 DIAGNOSIS — R51.9 HEADACHE, UNSPECIFIED: ICD-10-CM

## 2021-03-06 PROCEDURE — 70551 MRI BRAIN STEM W/O DYE: CPT | Mod: 26,MH

## 2021-03-06 PROCEDURE — 70551 MRI BRAIN STEM W/O DYE: CPT

## 2021-04-06 ENCOUNTER — APPOINTMENT (OUTPATIENT)
Dept: NEUROLOGY | Facility: CLINIC | Age: 26
End: 2021-04-06
Payer: MEDICAID

## 2021-04-06 VITALS
OXYGEN SATURATION: 98 % | HEIGHT: 60 IN | BODY MASS INDEX: 29.45 KG/M2 | DIASTOLIC BLOOD PRESSURE: 68 MMHG | TEMPERATURE: 98.2 F | WEIGHT: 150 LBS | HEART RATE: 71 BPM | SYSTOLIC BLOOD PRESSURE: 107 MMHG

## 2021-04-06 PROCEDURE — 99072 ADDL SUPL MATRL&STAF TM PHE: CPT

## 2021-04-06 PROCEDURE — 99214 OFFICE O/P EST MOD 30 MIN: CPT

## 2021-04-06 NOTE — PHYSICAL EXAM
[General Appearance - Alert] : alert [General Appearance - In No Acute Distress] : in no acute distress [Person] : oriented to person [Place] : oriented to place [Time] : oriented to time [Registration Intact] : recent registration memory intact [Concentration Intact] : normal concentrating ability [Naming Objects] : no difficulty naming common objects [Fluency] : fluency intact [Comprehension] : comprehension intact [Vocabulary] : adequate range of vocabulary [Abnormal Walk] : normal gait [Balance] : balance was intact

## 2021-04-06 NOTE — ASSESSMENT
[FreeTextEntry1] : Right side headache in patient with with nose bleeds with normal MRI  brain.  Will start Riboflavin 400mg daily, coenzyme Q 200mg daily and Magnesium 400mg.  Will refer the patient to ENT for nose bleeds eval.\par

## 2021-04-06 NOTE — HISTORY OF PRESENT ILLNESS
[FreeTextEntry1] : The patient was initially seen for eye twitching which has resolved, now she is here for new headache in bl frontal and temporal area without photo or phonophobia but with nausea. No aura. The headache improves with Medrol pack but the patient continues to have right side pressure pain when her nose bleed.\par \par No new medications, no OCP. She is currently breastfeeding, baby 1 yr old. \par

## 2021-04-07 ENCOUNTER — NON-APPOINTMENT (OUTPATIENT)
Age: 26
End: 2021-04-07

## 2021-05-03 ENCOUNTER — APPOINTMENT (OUTPATIENT)
Dept: DERMATOLOGY | Facility: CLINIC | Age: 26
End: 2021-05-03
Payer: MEDICAID

## 2021-05-03 PROCEDURE — 99214 OFFICE O/P EST MOD 30 MIN: CPT

## 2021-05-03 PROCEDURE — 99072 ADDL SUPL MATRL&STAF TM PHE: CPT

## 2021-05-06 ENCOUNTER — OUTPATIENT (OUTPATIENT)
Dept: OUTPATIENT SERVICES | Facility: HOSPITAL | Age: 26
LOS: 1 days | End: 2021-05-06
Payer: MEDICAID

## 2021-05-06 ENCOUNTER — APPOINTMENT (OUTPATIENT)
Dept: OBGYN | Facility: CLINIC | Age: 26
End: 2021-05-06
Payer: MEDICAID

## 2021-05-06 ENCOUNTER — ASOB RESULT (OUTPATIENT)
Age: 26
End: 2021-05-06

## 2021-05-06 ENCOUNTER — APPOINTMENT (OUTPATIENT)
Dept: ANTEPARTUM | Facility: CLINIC | Age: 26
End: 2021-05-06
Payer: MEDICAID

## 2021-05-06 ENCOUNTER — LABORATORY RESULT (OUTPATIENT)
Age: 26
End: 2021-05-06

## 2021-05-06 VITALS
BODY MASS INDEX: 31.41 KG/M2 | TEMPERATURE: 98.4 F | WEIGHT: 160 LBS | SYSTOLIC BLOOD PRESSURE: 105 MMHG | HEIGHT: 60 IN | HEART RATE: 90 BPM | OXYGEN SATURATION: 98 % | DIASTOLIC BLOOD PRESSURE: 69 MMHG

## 2021-05-06 DIAGNOSIS — Z00.00 ENCOUNTER FOR GENERAL ADULT MEDICAL EXAMINATION WITHOUT ABNORMAL FINDINGS: ICD-10-CM

## 2021-05-06 PROCEDURE — 86480 TB TEST CELL IMMUN MEASURE: CPT

## 2021-05-06 PROCEDURE — 83655 ASSAY OF LEAD: CPT

## 2021-05-06 PROCEDURE — 86850 RBC ANTIBODY SCREEN: CPT

## 2021-05-06 PROCEDURE — 82950 GLUCOSE TEST: CPT

## 2021-05-06 PROCEDURE — 36415 COLL VENOUS BLD VENIPUNCTURE: CPT

## 2021-05-06 PROCEDURE — G0463: CPT

## 2021-05-06 PROCEDURE — 99213 OFFICE O/P EST LOW 20 MIN: CPT

## 2021-05-06 PROCEDURE — 86765 RUBEOLA ANTIBODY: CPT

## 2021-05-06 PROCEDURE — 85025 COMPLETE CBC W/AUTO DIFF WBC: CPT

## 2021-05-06 PROCEDURE — 87186 SC STD MICRODIL/AGAR DIL: CPT

## 2021-05-06 PROCEDURE — 86762 RUBELLA ANTIBODY: CPT

## 2021-05-06 PROCEDURE — 84439 ASSAY OF FREE THYROXINE: CPT

## 2021-05-06 PROCEDURE — 84443 ASSAY THYROID STIM HORMONE: CPT

## 2021-05-06 PROCEDURE — 81243 FMR1 GEN ALY DETC ABNL ALLEL: CPT

## 2021-05-06 PROCEDURE — 81220 CFTR GENE COM VARIANTS: CPT

## 2021-05-06 PROCEDURE — 76817 TRANSVAGINAL US OBSTETRIC: CPT

## 2021-05-06 PROCEDURE — 99072 ADDL SUPL MATRL&STAF TM PHE: CPT

## 2021-05-06 PROCEDURE — 87086 URINE CULTURE/COLONY COUNT: CPT

## 2021-05-06 PROCEDURE — 81329 SMN1 GENE DOS/DELETION ALYS: CPT

## 2021-05-06 PROCEDURE — 86803 HEPATITIS C AB TEST: CPT

## 2021-05-06 PROCEDURE — 86787 VARICELLA-ZOSTER ANTIBODY: CPT

## 2021-05-06 PROCEDURE — 86901 BLOOD TYPING SEROLOGIC RH(D): CPT

## 2021-05-06 PROCEDURE — 87340 HEPATITIS B SURFACE AG IA: CPT

## 2021-05-06 PROCEDURE — 81001 URINALYSIS AUTO W/SCOPE: CPT

## 2021-05-06 PROCEDURE — 86900 BLOOD TYPING SEROLOGIC ABO: CPT

## 2021-05-06 PROCEDURE — 86780 TREPONEMA PALLIDUM: CPT

## 2021-05-06 PROCEDURE — 83020 HEMOGLOBIN ELECTROPHORESIS: CPT

## 2021-05-06 PROCEDURE — 87389 HIV-1 AG W/HIV-1&-2 AB AG IA: CPT

## 2021-05-06 PROCEDURE — 86376 MICROSOMAL ANTIBODY EACH: CPT

## 2021-05-06 PROCEDURE — 76811 OB US DETAILED SNGL FETUS: CPT

## 2021-05-07 DIAGNOSIS — Z30.09 ENCOUNTER FOR OTHER GENERAL COUNSELING AND ADVICE ON CONTRACEPTION: ICD-10-CM

## 2021-05-10 ENCOUNTER — NON-APPOINTMENT (OUTPATIENT)
Age: 26
End: 2021-05-10

## 2021-05-10 DIAGNOSIS — O09.92 SUPERVISION OF HIGH RISK PREGNANCY, UNSPECIFIED, SECOND TRIMESTER: ICD-10-CM

## 2021-05-10 DIAGNOSIS — Z3A.19 19 WEEKS GESTATION OF PREGNANCY: ICD-10-CM

## 2021-05-10 LAB
ABO + RH PNL BLD: NORMAL
APPEARANCE: ABNORMAL
BACTERIA UR CULT: ABNORMAL
BASOPHILS # BLD AUTO: 0.01 K/UL
BASOPHILS NFR BLD AUTO: 0.1 %
BILIRUBIN URINE: NEGATIVE
BLD GP AB SCN SERPL QL: NORMAL
BLOOD URINE: NORMAL
C TRACH RRNA SPEC QL NAA+PROBE: NOT DETECTED
COLOR: YELLOW
EOSINOPHIL # BLD AUTO: 0.15 K/UL
EOSINOPHIL NFR BLD AUTO: 1.7 %
GLUCOSE 1H P 50 G GLC PO SERPL-MCNC: 116 MG/DL
GLUCOSE QUALITATIVE U: ABNORMAL
HBV SURFACE AG SER QL: NONREACTIVE
HCT VFR BLD CALC: 35.4 %
HCV AB SER QL: NONREACTIVE
HCV S/CO RATIO: 0.11 S/CO
HGB A MFR BLD: 97.2 %
HGB A2 MFR BLD: 2.8 %
HGB BLD-MCNC: 11.5 G/DL
HGB FRACT BLD-IMP: NORMAL
HIV1+2 AB SPEC QL IA.RAPID: NONREACTIVE
IMM GRANULOCYTES NFR BLD AUTO: 0.7 %
KETONES URINE: NEGATIVE
LEAD BLD-MCNC: <1 UG/DL
LEUKOCYTE ESTERASE URINE: ABNORMAL
LYMPHOCYTES # BLD AUTO: 1.5 K/UL
LYMPHOCYTES NFR BLD AUTO: 16.6 %
M TB IFN-G BLD-IMP: NEGATIVE
MAN DIFF?: NORMAL
MCHC RBC-ENTMCNC: 29.1 PG
MCHC RBC-ENTMCNC: 32.5 GM/DL
MCV RBC AUTO: 89.6 FL
MEV IGG FLD QL IA: 234 AU/ML
MEV IGG+IGM SER-IMP: POSITIVE
MONOCYTES # BLD AUTO: 0.38 K/UL
MONOCYTES NFR BLD AUTO: 4.2 %
N GONORRHOEA RRNA SPEC QL NAA+PROBE: NOT DETECTED
NEUTROPHILS # BLD AUTO: 6.91 K/UL
NEUTROPHILS NFR BLD AUTO: 76.7 %
NITRITE URINE: NEGATIVE
PH URINE: 6
PLATELET # BLD AUTO: 307 K/UL
PROTEIN URINE: ABNORMAL
QUANTIFERON TB PLUS MITOGEN MINUS NIL: 2.42 IU/ML
QUANTIFERON TB PLUS NIL: 0.1 IU/ML
QUANTIFERON TB PLUS TB1 MINUS NIL: 0.01 IU/ML
QUANTIFERON TB PLUS TB2 MINUS NIL: 0.01 IU/ML
RBC # BLD: 3.95 M/UL
RBC # FLD: 13.8 %
RUBV IGG FLD-ACNC: 2.8 INDEX
RUBV IGG SER-IMP: POSITIVE
SOURCE AMPLIFICATION: NORMAL
SPECIFIC GRAVITY URINE: 1.03
T PALLIDUM AB SER QL IA: NEGATIVE
T4 FREE SERPL-MCNC: 0.9 NG/DL
THYROPEROXIDASE AB SERPL IA-ACNC: 316 IU/ML
TSH SERPL-ACNC: 6.4 UIU/ML
UROBILINOGEN URINE: NORMAL
VZV AB TITR SER: POSITIVE
VZV IGG SER IF-ACNC: 1513 INDEX
WBC # FLD AUTO: 9.01 K/UL

## 2021-05-17 LAB
2ND TRIMESTER DATA: NORMAL
AFP PNL SERPL: NORMAL
AFP SERPL-ACNC: NORMAL
AR GENE MUT ANL BLD/T: NORMAL
B-HCG FREE SERPL-MCNC: NORMAL
CLINICAL BIOCHEMIST REVIEW: NORMAL
FMR1 GENE MUT ANL BLD/T: NORMAL
INHIBIN A SERPL-MCNC: NORMAL
NOTES NTD: NORMAL
U ESTRIOL SERPL-SCNC: NORMAL

## 2021-05-18 ENCOUNTER — APPOINTMENT (OUTPATIENT)
Dept: ANTEPARTUM | Facility: CLINIC | Age: 26
End: 2021-05-18

## 2021-05-20 ENCOUNTER — NON-APPOINTMENT (OUTPATIENT)
Age: 26
End: 2021-05-20

## 2021-05-20 ENCOUNTER — APPOINTMENT (OUTPATIENT)
Dept: OBGYN | Facility: CLINIC | Age: 26
End: 2021-05-20
Payer: MEDICAID

## 2021-05-20 ENCOUNTER — OUTPATIENT (OUTPATIENT)
Dept: OUTPATIENT SERVICES | Facility: HOSPITAL | Age: 26
LOS: 1 days | End: 2021-05-20
Payer: MEDICAID

## 2021-05-20 VITALS
RESPIRATION RATE: 18 BRPM | TEMPERATURE: 98.1 F | BODY MASS INDEX: 32.2 KG/M2 | SYSTOLIC BLOOD PRESSURE: 108 MMHG | WEIGHT: 164 LBS | DIASTOLIC BLOOD PRESSURE: 59 MMHG | OXYGEN SATURATION: 96 % | HEIGHT: 60 IN | HEART RATE: 88 BPM

## 2021-05-20 DIAGNOSIS — Z34.00 ENCOUNTER FOR SUPERVISION OF NORMAL FIRST PREGNANCY, UNSPECIFIED TRIMESTER: ICD-10-CM

## 2021-05-20 PROCEDURE — 99213 OFFICE O/P EST LOW 20 MIN: CPT

## 2021-05-20 PROCEDURE — G0463: CPT

## 2021-05-20 PROCEDURE — 81003 URINALYSIS AUTO W/O SCOPE: CPT

## 2021-05-21 DIAGNOSIS — Z34.90 ENCOUNTER FOR SUPERVISION OF NORMAL PREGNANCY, UNSPECIFIED, UNSPECIFIED TRIMESTER: ICD-10-CM

## 2021-05-21 DIAGNOSIS — Z34.91 ENCOUNTER FOR SUPERVISION OF NORMAL PREGNANCY, UNSPECIFIED, FIRST TRIMESTER: ICD-10-CM

## 2021-05-27 LAB — CFTR MUT TESTED BLD/T: NEGATIVE

## 2021-06-21 ENCOUNTER — APPOINTMENT (OUTPATIENT)
Dept: OBGYN | Facility: CLINIC | Age: 26
End: 2021-06-21
Payer: MEDICAID

## 2021-06-21 ENCOUNTER — OUTPATIENT (OUTPATIENT)
Dept: OUTPATIENT SERVICES | Facility: HOSPITAL | Age: 26
LOS: 1 days | End: 2021-06-21
Payer: MEDICAID

## 2021-06-21 ENCOUNTER — ASOB RESULT (OUTPATIENT)
Age: 26
End: 2021-06-21

## 2021-06-21 ENCOUNTER — NON-APPOINTMENT (OUTPATIENT)
Age: 26
End: 2021-06-21

## 2021-06-21 ENCOUNTER — APPOINTMENT (OUTPATIENT)
Dept: ANTEPARTUM | Facility: CLINIC | Age: 26
End: 2021-06-21
Payer: MEDICAID

## 2021-06-21 VITALS
SYSTOLIC BLOOD PRESSURE: 100 MMHG | BODY MASS INDEX: 32.79 KG/M2 | HEIGHT: 60 IN | RESPIRATION RATE: 18 BRPM | DIASTOLIC BLOOD PRESSURE: 58 MMHG | WEIGHT: 167 LBS | TEMPERATURE: 97.1 F | HEART RATE: 86 BPM | OXYGEN SATURATION: 97 %

## 2021-06-21 DIAGNOSIS — Z86.03 PERSONAL HISTORY OF NEOPLASM OF UNCERTAIN BEHAVIOR: ICD-10-CM

## 2021-06-21 DIAGNOSIS — Z3A.19 19 WEEKS GESTATION OF PREGNANCY: ICD-10-CM

## 2021-06-21 DIAGNOSIS — Z78.9 OTHER SPECIFIED HEALTH STATUS: ICD-10-CM

## 2021-06-21 DIAGNOSIS — Z87.2 PERSONAL HISTORY OF DISEASES OF THE SKIN AND SUBCUTANEOUS TISSUE: ICD-10-CM

## 2021-06-21 DIAGNOSIS — Z34.00 ENCOUNTER FOR SUPERVISION OF NORMAL FIRST PREGNANCY, UNSPECIFIED TRIMESTER: ICD-10-CM

## 2021-06-21 DIAGNOSIS — Z87.898 PERSONAL HISTORY OF OTHER SPECIFIED CONDITIONS: ICD-10-CM

## 2021-06-21 PROCEDURE — 99214 OFFICE O/P EST MOD 30 MIN: CPT

## 2021-06-21 PROCEDURE — 86780 TREPONEMA PALLIDUM: CPT

## 2021-06-21 PROCEDURE — 87086 URINE CULTURE/COLONY COUNT: CPT

## 2021-06-21 PROCEDURE — 87186 SC STD MICRODIL/AGAR DIL: CPT

## 2021-06-21 PROCEDURE — 36415 COLL VENOUS BLD VENIPUNCTURE: CPT | Mod: NC

## 2021-06-21 PROCEDURE — 84439 ASSAY OF FREE THYROXINE: CPT

## 2021-06-21 PROCEDURE — 36415 COLL VENOUS BLD VENIPUNCTURE: CPT

## 2021-06-21 PROCEDURE — 86376 MICROSOMAL ANTIBODY EACH: CPT

## 2021-06-21 PROCEDURE — 99212 OFFICE O/P EST SF 10 MIN: CPT | Mod: TH,25

## 2021-06-21 PROCEDURE — 82950 GLUCOSE TEST: CPT

## 2021-06-21 PROCEDURE — G0463: CPT

## 2021-06-21 PROCEDURE — 84443 ASSAY THYROID STIM HORMONE: CPT

## 2021-06-21 PROCEDURE — 76816 OB US FOLLOW-UP PER FETUS: CPT

## 2021-06-21 PROCEDURE — 85025 COMPLETE CBC W/AUTO DIFF WBC: CPT

## 2021-06-21 PROCEDURE — 81003 URINALYSIS AUTO W/O SCOPE: CPT

## 2021-06-22 DIAGNOSIS — Z34.93 ENCOUNTER FOR SUPERVISION OF NORMAL PREGNANCY, UNSPECIFIED, THIRD TRIMESTER: ICD-10-CM

## 2021-06-22 DIAGNOSIS — E03.9 HYPOTHYROIDISM, UNSPECIFIED: ICD-10-CM

## 2021-06-22 DIAGNOSIS — Z3A.25 25 WEEKS GESTATION OF PREGNANCY: ICD-10-CM

## 2021-06-22 DIAGNOSIS — L30.9 DERMATITIS, UNSPECIFIED: ICD-10-CM

## 2021-06-22 DIAGNOSIS — L43.9 LICHEN PLANUS, UNSPECIFIED: ICD-10-CM

## 2021-06-22 DIAGNOSIS — R04.0 EPISTAXIS: ICD-10-CM

## 2021-06-22 PROBLEM — Z30.013 INITIATION OF DEPO PROVERA: Status: RESOLVED | Noted: 2020-03-24 | Resolved: 2021-06-21

## 2021-06-22 LAB
BASOPHILS # BLD AUTO: 0.02 K/UL
BASOPHILS NFR BLD AUTO: 0.2 %
EOSINOPHIL # BLD AUTO: 0.17 K/UL
EOSINOPHIL NFR BLD AUTO: 1.7 %
GLUCOSE 1H P 50 G GLC PO SERPL-MCNC: 93 MG/DL
HCT VFR BLD CALC: 33 %
HGB BLD-MCNC: 11.1 G/DL
IMM GRANULOCYTES NFR BLD AUTO: 0.3 %
LYMPHOCYTES # BLD AUTO: 1.56 K/UL
LYMPHOCYTES NFR BLD AUTO: 16 %
MAN DIFF?: NORMAL
MCHC RBC-ENTMCNC: 30.3 PG
MCHC RBC-ENTMCNC: 33.6 GM/DL
MCV RBC AUTO: 90.2 FL
MONOCYTES # BLD AUTO: 0.46 K/UL
MONOCYTES NFR BLD AUTO: 4.7 %
NEUTROPHILS # BLD AUTO: 7.5 K/UL
NEUTROPHILS NFR BLD AUTO: 77.1 %
PLATELET # BLD AUTO: 275 K/UL
RBC # BLD: 3.66 M/UL
RBC # FLD: 13.2 %
T PALLIDUM AB SER QL IA: NEGATIVE
T4 FREE SERPL-MCNC: 0.9 NG/DL
TSH SERPL-ACNC: 6.28 UIU/ML
WBC # FLD AUTO: 9.74 K/UL

## 2021-06-22 RX ORDER — MEDROXYPROGESTERONE ACETATE 150 MG/ML
150 INJECTION, SUSPENSION INTRAMUSCULAR
Qty: 1 | Refills: 3 | Status: DISCONTINUED | COMMUNITY
Start: 2020-03-24 | End: 2021-06-22

## 2021-06-22 RX ORDER — BETAMETHASONE DIPROPIONATE 0.5 MG/G
0.05 CREAM, AUGMENTED TOPICAL
Qty: 50 | Refills: 0 | Status: COMPLETED | COMMUNITY
Start: 2020-11-23 | End: 2021-06-22

## 2021-06-22 RX ORDER — METHYLPREDNISOLONE 4 MG/1
4 TABLET ORAL
Qty: 1 | Refills: 0 | Status: COMPLETED | COMMUNITY
Start: 2021-02-26 | End: 2021-06-22

## 2021-06-22 RX ORDER — METRONIDAZOLE 7.5 MG/G
0.75 GEL VAGINAL
Qty: 1 | Refills: 0 | Status: COMPLETED | COMMUNITY
Start: 2020-06-18 | End: 2021-06-22

## 2021-06-22 RX ORDER — LEVOTHYROXINE SODIUM 0.05 MG/1
50 TABLET ORAL DAILY
Qty: 60 | Refills: 2 | Status: COMPLETED | COMMUNITY
Start: 2021-05-10 | End: 2021-06-22

## 2021-06-22 RX ORDER — NORETHINDRONE ACETATE AND ETHINYL ESTRADIOL 1; 20 MG/1; UG/1
1-20 TABLET ORAL DAILY
Qty: 28 | Refills: 0 | Status: DISCONTINUED | COMMUNITY
Start: 2020-12-21 | End: 2021-06-22

## 2021-06-24 LAB
BACTERIA UR CULT: ABNORMAL
THYROPEROXIDASE AB SERPL IA-ACNC: 226 IU/ML

## 2021-06-25 ENCOUNTER — NON-APPOINTMENT (OUTPATIENT)
Age: 26
End: 2021-06-25

## 2021-07-07 ENCOUNTER — APPOINTMENT (OUTPATIENT)
Dept: NEUROLOGY | Facility: CLINIC | Age: 26
End: 2021-07-07

## 2021-07-07 NOTE — ASSESSMENT
[FreeTextEntry1] : Right side headache in patient with with nose bleeds with normal MRI brain. Will start Riboflavin 400mg daily, coenzyme Q 200mg daily and Magnesium 400mg. Will refer the patient to ENT for nose bleeds eval.\par  \par

## 2021-07-19 ENCOUNTER — APPOINTMENT (OUTPATIENT)
Dept: ANTEPARTUM | Facility: CLINIC | Age: 26
End: 2021-07-19
Payer: MEDICAID

## 2021-07-19 ENCOUNTER — APPOINTMENT (OUTPATIENT)
Dept: OBGYN | Facility: CLINIC | Age: 26
End: 2021-07-19
Payer: MEDICAID

## 2021-07-19 ENCOUNTER — OUTPATIENT (OUTPATIENT)
Dept: OUTPATIENT SERVICES | Facility: HOSPITAL | Age: 26
LOS: 1 days | End: 2021-07-19
Payer: MEDICAID

## 2021-07-19 ENCOUNTER — ASOB RESULT (OUTPATIENT)
Age: 26
End: 2021-07-19

## 2021-07-19 ENCOUNTER — NON-APPOINTMENT (OUTPATIENT)
Age: 26
End: 2021-07-19

## 2021-07-19 VITALS
TEMPERATURE: 98.2 F | HEART RATE: 86 BPM | OXYGEN SATURATION: 98 % | SYSTOLIC BLOOD PRESSURE: 103 MMHG | BODY MASS INDEX: 33.38 KG/M2 | DIASTOLIC BLOOD PRESSURE: 67 MMHG | WEIGHT: 170 LBS | HEIGHT: 60 IN

## 2021-07-19 DIAGNOSIS — Z34.00 ENCOUNTER FOR SUPERVISION OF NORMAL FIRST PREGNANCY, UNSPECIFIED TRIMESTER: ICD-10-CM

## 2021-07-19 PROCEDURE — 76816 OB US FOLLOW-UP PER FETUS: CPT

## 2021-07-19 PROCEDURE — 87086 URINE CULTURE/COLONY COUNT: CPT

## 2021-07-19 PROCEDURE — G0463: CPT

## 2021-07-19 PROCEDURE — 99213 OFFICE O/P EST LOW 20 MIN: CPT

## 2021-07-19 PROCEDURE — 81003 URINALYSIS AUTO W/O SCOPE: CPT

## 2021-07-21 ENCOUNTER — APPOINTMENT (OUTPATIENT)
Dept: ENDOCRINOLOGY | Facility: CLINIC | Age: 26
End: 2021-07-21
Payer: MEDICAID

## 2021-07-21 VITALS
DIASTOLIC BLOOD PRESSURE: 69 MMHG | BODY MASS INDEX: 32.42 KG/M2 | HEART RATE: 94 BPM | SYSTOLIC BLOOD PRESSURE: 107 MMHG | WEIGHT: 166 LBS

## 2021-07-21 DIAGNOSIS — O09.92 SUPERVISION OF HIGH RISK PREGNANCY, UNSPECIFIED, SECOND TRIMESTER: ICD-10-CM

## 2021-07-21 DIAGNOSIS — Z3A.29 29 WEEKS GESTATION OF PREGNANCY: ICD-10-CM

## 2021-07-21 PROCEDURE — 99204 OFFICE O/P NEW MOD 45 MIN: CPT

## 2021-07-22 ENCOUNTER — TRANSCRIPTION ENCOUNTER (OUTPATIENT)
Age: 26
End: 2021-07-22

## 2021-07-22 ENCOUNTER — APPOINTMENT (OUTPATIENT)
Dept: NEUROLOGY | Facility: CLINIC | Age: 26
End: 2021-07-22

## 2021-07-22 LAB — BACTERIA UR CULT: NORMAL

## 2021-07-23 ENCOUNTER — APPOINTMENT (OUTPATIENT)
Dept: NEUROLOGY | Facility: CLINIC | Age: 26
End: 2021-07-23
Payer: MEDICAID

## 2021-07-23 VITALS
WEIGHT: 166 LBS | TEMPERATURE: 97.9 F | DIASTOLIC BLOOD PRESSURE: 65 MMHG | OXYGEN SATURATION: 98 % | HEIGHT: 60 IN | HEART RATE: 98 BPM | SYSTOLIC BLOOD PRESSURE: 112 MMHG | BODY MASS INDEX: 32.59 KG/M2

## 2021-07-23 PROCEDURE — 99213 OFFICE O/P EST LOW 20 MIN: CPT

## 2021-07-23 NOTE — HISTORY OF PRESENT ILLNESS
[FreeTextEntry1] : The patient was initially seen for eye twitching which has resolved, now she is here for new headache in bl frontal and temporal area without photo or phonophobia but with nausea. No aura. The headache improves with Medrol pack but the patient continues to have right side pressure pain when her nose bleed.\par \par No new medications, no OCP. She is currently breastfeeding, baby 1 yr old. \par Patient's headaches are well controlled on magnesium, riboflavin and coenzyme Q.\par

## 2021-07-23 NOTE — ASSESSMENT
[FreeTextEntry1] : Right side headache in patient with with nose bleeds with normal MRI brain.  Continue weight riboflavin 400mg daily, coenzyme Q 200mg daily and Magnesium 400mg. \par \par Nosebleeds refer to ENT for nose bleeds eval.\par \par No neurological contraindication for epidural.\par \par I spent the time noted on the day of this patient encounter preparing for, providing and documenting the above E/M service and counseling and educate patient on differential, workup, disease course, and treatment/management. Education was provided to the patient during this encounter. All questions and concerns were answered and addressed in detail. The patient verbalized understanding and agreed to plan. Patient was advised to continue to monitor for neurologic symptoms and to notify my office or go to the nearest emergency room if there are any changes. Any orders/referrals and communications were provided as well. \par Side effects of the above medications were discussed in detail including but not limited to applicable black box warning and teratogenicity as appropriate. \par Patient was advised to bring previous records to my office, including CD of imaging, when applicable. \par \par

## 2021-07-26 ENCOUNTER — EMERGENCY (EMERGENCY)
Facility: HOSPITAL | Age: 26
LOS: 1 days | Discharge: HOME | End: 2021-07-26
Attending: EMERGENCY MEDICINE
Payer: MEDICAID

## 2021-07-26 VITALS
HEART RATE: 122 BPM | RESPIRATION RATE: 18 BRPM | HEIGHT: 60 IN | TEMPERATURE: 99 F | DIASTOLIC BLOOD PRESSURE: 69 MMHG | SYSTOLIC BLOOD PRESSURE: 106 MMHG | WEIGHT: 171.96 LBS | OXYGEN SATURATION: 96 %

## 2021-07-26 LAB
APPEARANCE UR: CLEAR — SIGNIFICANT CHANGE UP
BACTERIA # UR AUTO: ABNORMAL /HPF
BASOPHILS # BLD AUTO: 0.02 K/UL — SIGNIFICANT CHANGE UP (ref 0–0.2)
BASOPHILS NFR BLD AUTO: 0.2 % — SIGNIFICANT CHANGE UP (ref 0–2)
BILIRUB UR-MCNC: NEGATIVE — SIGNIFICANT CHANGE UP
COLOR SPEC: YELLOW — SIGNIFICANT CHANGE UP
DIFF PNL FLD: ABNORMAL
EOSINOPHIL # BLD AUTO: 0.1 K/UL — SIGNIFICANT CHANGE UP (ref 0–0.5)
EOSINOPHIL NFR BLD AUTO: 0.8 % — SIGNIFICANT CHANGE UP (ref 0–6)
EPI CELLS # UR: ABNORMAL /HPF
GLUCOSE UR QL: NEGATIVE — SIGNIFICANT CHANGE UP
HCT VFR BLD CALC: 32.3 % — LOW (ref 34.5–45)
HGB BLD-MCNC: 10.6 G/DL — LOW (ref 11.5–15.5)
IMM GRANULOCYTES NFR BLD AUTO: 0.5 % — SIGNIFICANT CHANGE UP (ref 0–1.5)
KETONES UR-MCNC: NEGATIVE — SIGNIFICANT CHANGE UP
LEUKOCYTE ESTERASE UR-ACNC: NEGATIVE — SIGNIFICANT CHANGE UP
LYMPHOCYTES # BLD AUTO: 1.43 K/UL — SIGNIFICANT CHANGE UP (ref 1–3.3)
LYMPHOCYTES # BLD AUTO: 11.1 % — LOW (ref 13–44)
MCHC RBC-ENTMCNC: 28.6 PG — SIGNIFICANT CHANGE UP (ref 27–34)
MCHC RBC-ENTMCNC: 32.8 GM/DL — SIGNIFICANT CHANGE UP (ref 32–36)
MCV RBC AUTO: 87.1 FL — SIGNIFICANT CHANGE UP (ref 80–100)
MONOCYTES # BLD AUTO: 0.84 K/UL — SIGNIFICANT CHANGE UP (ref 0–0.9)
MONOCYTES NFR BLD AUTO: 6.5 % — SIGNIFICANT CHANGE UP (ref 2–14)
NEUTROPHILS # BLD AUTO: 10.48 K/UL — HIGH (ref 1.8–7.4)
NEUTROPHILS NFR BLD AUTO: 80.9 % — HIGH (ref 43–77)
NITRITE UR-MCNC: NEGATIVE — SIGNIFICANT CHANGE UP
NRBC # BLD: 0 /100 WBCS — SIGNIFICANT CHANGE UP (ref 0–0)
PH UR: 6.5 — SIGNIFICANT CHANGE UP (ref 5–8)
PLATELET # BLD AUTO: 322 K/UL — SIGNIFICANT CHANGE UP (ref 150–400)
PROT UR-MCNC: NEGATIVE — SIGNIFICANT CHANGE UP
RBC # BLD: 3.71 M/UL — LOW (ref 3.8–5.2)
RBC # FLD: 12.9 % — SIGNIFICANT CHANGE UP (ref 10.3–14.5)
RBC CASTS # UR COMP ASSIST: ABNORMAL /HPF (ref 0–2)
SP GR SPEC: 1.01 — SIGNIFICANT CHANGE UP (ref 1.01–1.02)
UROBILINOGEN FLD QL: NEGATIVE — SIGNIFICANT CHANGE UP
WBC # BLD: 12.94 K/UL — HIGH (ref 3.8–10.5)
WBC # FLD AUTO: 12.94 K/UL — HIGH (ref 3.8–10.5)
WBC UR QL: SIGNIFICANT CHANGE UP /HPF (ref 0–5)

## 2021-07-26 PROCEDURE — 99285 EMERGENCY DEPT VISIT HI MDM: CPT

## 2021-07-26 PROCEDURE — 71046 X-RAY EXAM CHEST 2 VIEWS: CPT | Mod: 26

## 2021-07-26 RX ORDER — SODIUM CHLORIDE 9 MG/ML
2400 INJECTION INTRAMUSCULAR; INTRAVENOUS; SUBCUTANEOUS ONCE
Refills: 0 | Status: COMPLETED | OUTPATIENT
Start: 2021-07-26 | End: 2021-07-26

## 2021-07-26 RX ADMIN — SODIUM CHLORIDE 2400 MILLILITER(S): 9 INJECTION INTRAMUSCULAR; INTRAVENOUS; SUBCUTANEOUS at 23:59

## 2021-07-26 RX ADMIN — SODIUM CHLORIDE 2400 MILLILITER(S): 9 INJECTION INTRAMUSCULAR; INTRAVENOUS; SUBCUTANEOUS at 23:39

## 2021-07-26 NOTE — ED ADULT TRIAGE NOTE - CHIEF COMPLAINT QUOTE
She is 30 weeks pregnant and has chest congestion, cough, sore throat, difficulty swallowing x 2 weeks

## 2021-07-27 ENCOUNTER — NON-APPOINTMENT (OUTPATIENT)
Age: 26
End: 2021-07-27

## 2021-07-27 ENCOUNTER — OUTPATIENT (OUTPATIENT)
Dept: OUTPATIENT SERVICES | Facility: HOSPITAL | Age: 26
LOS: 1 days | End: 2021-07-27
Payer: MEDICAID

## 2021-07-27 VITALS — TEMPERATURE: 99 F

## 2021-07-27 DIAGNOSIS — Z3A.00 WEEKS OF GESTATION OF PREGNANCY NOT SPECIFIED: ICD-10-CM

## 2021-07-27 DIAGNOSIS — O26.899 OTHER SPECIFIED PREGNANCY RELATED CONDITIONS, UNSPECIFIED TRIMESTER: ICD-10-CM

## 2021-07-27 LAB
ALBUMIN SERPL ELPH-MCNC: 2.4 G/DL — LOW (ref 3.5–5)
ALP SERPL-CCNC: 125 U/L — HIGH (ref 40–120)
ALT FLD-CCNC: 11 U/L DA — SIGNIFICANT CHANGE UP (ref 10–60)
ANION GAP SERPL CALC-SCNC: 10 MMOL/L — SIGNIFICANT CHANGE UP (ref 5–17)
AST SERPL-CCNC: 16 U/L — SIGNIFICANT CHANGE UP (ref 10–40)
BILIRUB SERPL-MCNC: 0.2 MG/DL — SIGNIFICANT CHANGE UP (ref 0.2–1.2)
BUN SERPL-MCNC: 5 MG/DL — LOW (ref 7–18)
CALCIUM SERPL-MCNC: 8.6 MG/DL — SIGNIFICANT CHANGE UP (ref 8.4–10.5)
CHLORIDE SERPL-SCNC: 104 MMOL/L — SIGNIFICANT CHANGE UP (ref 96–108)
CO2 SERPL-SCNC: 24 MMOL/L — SIGNIFICANT CHANGE UP (ref 22–31)
CREAT SERPL-MCNC: 0.62 MG/DL — SIGNIFICANT CHANGE UP (ref 0.5–1.3)
D DIMER BLD IA.RAPID-MCNC: 2523 NG/ML DDU — HIGH
GLUCOSE SERPL-MCNC: 79 MG/DL — SIGNIFICANT CHANGE UP (ref 70–99)
LACTATE SERPL-SCNC: 1 MMOL/L — SIGNIFICANT CHANGE UP (ref 0.7–2)
POTASSIUM SERPL-MCNC: 3.9 MMOL/L — SIGNIFICANT CHANGE UP (ref 3.5–5.3)
POTASSIUM SERPL-SCNC: 3.9 MMOL/L — SIGNIFICANT CHANGE UP (ref 3.5–5.3)
PROT SERPL-MCNC: 7 G/DL — SIGNIFICANT CHANGE UP (ref 6–8.3)
SARS-COV-2 RNA SPEC QL NAA+PROBE: SIGNIFICANT CHANGE UP
SODIUM SERPL-SCNC: 138 MMOL/L — SIGNIFICANT CHANGE UP (ref 135–145)

## 2021-07-27 PROCEDURE — 87040 BLOOD CULTURE FOR BACTERIA: CPT

## 2021-07-27 PROCEDURE — 99284 EMERGENCY DEPT VISIT MOD MDM: CPT | Mod: 25

## 2021-07-27 PROCEDURE — 71046 X-RAY EXAM CHEST 2 VIEWS: CPT

## 2021-07-27 PROCEDURE — 83605 ASSAY OF LACTIC ACID: CPT

## 2021-07-27 PROCEDURE — 71275 CT ANGIOGRAPHY CHEST: CPT | Mod: 26,MA

## 2021-07-27 PROCEDURE — 85025 COMPLETE CBC W/AUTO DIFF WBC: CPT

## 2021-07-27 PROCEDURE — 71275 CT ANGIOGRAPHY CHEST: CPT | Mod: MA

## 2021-07-27 PROCEDURE — 85379 FIBRIN DEGRADATION QUANT: CPT

## 2021-07-27 PROCEDURE — 94640 AIRWAY INHALATION TREATMENT: CPT

## 2021-07-27 PROCEDURE — G0463: CPT

## 2021-07-27 PROCEDURE — 93005 ELECTROCARDIOGRAM TRACING: CPT

## 2021-07-27 PROCEDURE — 87086 URINE CULTURE/COLONY COUNT: CPT

## 2021-07-27 PROCEDURE — 59025 FETAL NON-STRESS TEST: CPT

## 2021-07-27 PROCEDURE — 81001 URINALYSIS AUTO W/SCOPE: CPT

## 2021-07-27 PROCEDURE — 87635 SARS-COV-2 COVID-19 AMP PRB: CPT

## 2021-07-27 PROCEDURE — 80053 COMPREHEN METABOLIC PANEL: CPT

## 2021-07-27 PROCEDURE — 36415 COLL VENOUS BLD VENIPUNCTURE: CPT

## 2021-07-27 RX ORDER — NITROFURANTOIN (MONOHYDRATE/MACROCRYSTALS) 25; 75 MG/1; MG/1
100 CAPSULE ORAL
Qty: 14 | Refills: 0 | Status: COMPLETED | COMMUNITY
Start: 2021-06-24 | End: 2021-07-27

## 2021-07-27 RX ORDER — ALBUTEROL 90 UG/1
1 AEROSOL, METERED ORAL ONCE
Refills: 0 | Status: COMPLETED | OUTPATIENT
Start: 2021-07-27 | End: 2021-07-27

## 2021-07-27 RX ORDER — GUAIFENESIN/DEXTROMETHORPHAN 600MG-30MG
10 TABLET, EXTENDED RELEASE 12 HR ORAL ONCE
Refills: 0 | Status: COMPLETED | OUTPATIENT
Start: 2021-07-27 | End: 2021-07-27

## 2021-07-27 RX ADMIN — ALBUTEROL 1 PUFF(S): 90 AEROSOL, METERED ORAL at 01:01

## 2021-07-27 RX ADMIN — Medication 10 MILLILITER(S): at 01:00

## 2021-07-27 NOTE — ED PROVIDER NOTE - OBJECTIVE STATEMENT
27 y/o female,  currently 30 weeks pregnant, comes in with intermittent cough x 2 weeks, now having pleuritic chest pain. Reports green sputum production and sore troat. States she feels like her throat is tightening every time she talks.

## 2021-07-27 NOTE — ED PROVIDER NOTE - PATIENT PORTAL LINK FT
You can access the FollowMyHealth Patient Portal offered by [facility] by registering at the following website: [email or web address]. By joining [email or web address]’s FollowMyHealth portal, you will also be able to view your health information using other applications (apps) compatible with our system.

## 2021-07-27 NOTE — ED PROVIDER NOTE - CLINICAL SUMMARY MEDICAL DECISION MAKING FREE TEXT BOX
Given pt's heart rate and given her green sputum production and coughing, will do sepsis workup to r/o pneumonia and hydrate.

## 2021-07-27 NOTE — ED PROVIDER NOTE - PROGRESS NOTE DETAILS
patient signeodut to me by Dr. winston while awaiting CTA chest.  CTA shows Limited study. No CT evidence of pulmonary embolism in the main pulmonary trunk; evaluation is limited due to poor/nondiagnostic pulmonary arterial opacification and thromboembolic disease is not excluded distal to the main pulmonary trunk. Consider repeat study or nuclear medicine VQ scan with persistent concern.  Discussed above with patient. On reeval patient reports significant improvement of chest congestion after albuterol MDI treatment. No evidence of hypoxia.  which is normal in pregnancy. patient medically cleared for discharge, will send to L&D for fetal monitoring. patient given careful return to ED precautions. Manuel RAYO

## 2021-07-27 NOTE — ED PROVIDER NOTE - NSFOLLOWUPINSTRUCTIONS_ED_ALL_ED_FT
use albuterol inhaler 2 puffs every 6 hours as needed for cough and shortness of breath.  Return to ED if your symptoms worsen, you develop severe chest pain or difficulty breathing.      Upper Respiratory Infection, Adult      An upper respiratory infection (URI) affects the nose, throat, and upper air passages. URIs are caused by germs (viruses). The most common type of URI is often called "the common cold."    Medicines cannot cure URIs, but you can do things at home to relieve your symptoms. URIs usually get better within 7–10 days.      Follow these instructions at home:    Activity     •Rest as needed.    •If you have a fever, stay home from work or school until your fever is gone, or until your doctor says you may return to work or school.  •You should stay home until you cannot spread the infection anymore (you are not contagious).      •Your doctor may have you wear a face mask so you have less risk of spreading the infection.        Relieving symptoms     •Gargle with a salt-water mixture 3–4 times a day or as needed. To make a salt-water mixture, completely dissolve ½–1 tsp of salt in 1 cup of warm water.      •Use a cool-mist humidifier to add moisture to the air. This can help you breathe more easily.        Eating and drinking      •Drink enough fluid to keep your pee (urine) pale yellow.      •Eat soups and other clear broths.        General instructions      •Take over-the-counter and prescription medicines only as told by your doctor. These include cold medicines, fever reducers, and cough suppressants.      • Do not use any products that contain nicotine or tobacco. These include cigarettes and e-cigarettes. If you need help quitting, ask your doctor.      •Avoid being where people are smoking (avoid secondhand smoke).      •Make sure you get regular shots and get the flu shot every year.      •Keep all follow-up visits as told by your doctor. This is important.        How to avoid spreading infection to others      •Wash your hands often with soap and water. If you do not have soap and water, use hand .      •Avoid touching your mouth, face, eyes, or nose.      •Cough or sneeze into a tissue or your sleeve or elbow. Do not cough or sneeze into your hand or into the air.        Contact a doctor if:    •You are getting worse, not better.    •You have any of these:  •A fever.      •Chills.      •Brown or red mucus in your nose.      •Yellow or brown fluid (discharge)coming from your nose.      •Pain in your face, especially when you bend forward.      •Swollen neck glands.      •Pain with swallowing.      •White areas in the back of your throat.          Get help right away if:    •You have shortness of breath that gets worse.    •You have very bad or constant:  •Headache.      •Ear pain.      •Pain in your forehead, behind your eyes, and over your cheekbones (sinus pain).      •Chest pain.      •You have long-lasting (chronic) lung disease along with any of these:  •Wheezing.      •Long-lasting cough.      •Coughing up blood.      •A change in your usual mucus.        •You have a stiff neck.    •You have changes in your:  •Vision.      •Hearing.      •Thinking.      •Mood.          Summary    •An upper respiratory infection (URI) is caused by a germ called a virus. The most common type of URI is often called "the common cold."      •URIs usually get better within 7–10 days.      •Take over-the-counter and prescription medicines only as told by your doctor.

## 2021-07-28 ENCOUNTER — APPOINTMENT (OUTPATIENT)
Dept: NEUROLOGY | Facility: CLINIC | Age: 26
End: 2021-07-28

## 2021-07-28 LAB
CULTURE RESULTS: SIGNIFICANT CHANGE UP
SPECIMEN SOURCE: SIGNIFICANT CHANGE UP

## 2021-08-01 LAB
CULTURE RESULTS: SIGNIFICANT CHANGE UP
CULTURE RESULTS: SIGNIFICANT CHANGE UP
SPECIMEN SOURCE: SIGNIFICANT CHANGE UP
SPECIMEN SOURCE: SIGNIFICANT CHANGE UP

## 2021-08-02 ENCOUNTER — APPOINTMENT (OUTPATIENT)
Dept: OBGYN | Facility: CLINIC | Age: 26
End: 2021-08-02
Payer: MEDICAID

## 2021-08-02 ENCOUNTER — OUTPATIENT (OUTPATIENT)
Dept: OUTPATIENT SERVICES | Facility: HOSPITAL | Age: 26
LOS: 1 days | End: 2021-08-02
Payer: MEDICAID

## 2021-08-02 VITALS
WEIGHT: 172 LBS | TEMPERATURE: 97.2 F | BODY MASS INDEX: 33.77 KG/M2 | OXYGEN SATURATION: 98 % | HEART RATE: 88 BPM | HEIGHT: 60 IN | DIASTOLIC BLOOD PRESSURE: 65 MMHG | RESPIRATION RATE: 16 BRPM | SYSTOLIC BLOOD PRESSURE: 100 MMHG

## 2021-08-02 DIAGNOSIS — Z34.00 ENCOUNTER FOR SUPERVISION OF NORMAL FIRST PREGNANCY, UNSPECIFIED TRIMESTER: ICD-10-CM

## 2021-08-02 PROCEDURE — G0463: CPT

## 2021-08-02 PROCEDURE — 81003 URINALYSIS AUTO W/O SCOPE: CPT

## 2021-08-02 PROCEDURE — 87081 CULTURE SCREEN ONLY: CPT

## 2021-08-02 PROCEDURE — 99213 OFFICE O/P EST LOW 20 MIN: CPT

## 2021-08-04 DIAGNOSIS — R06.00 DYSPNEA, UNSPECIFIED: ICD-10-CM

## 2021-08-04 DIAGNOSIS — Z3A.31 31 WEEKS GESTATION OF PREGNANCY: ICD-10-CM

## 2021-08-04 DIAGNOSIS — R05 COUGH: ICD-10-CM

## 2021-08-04 DIAGNOSIS — Z34.93 ENCOUNTER FOR SUPERVISION OF NORMAL PREGNANCY, UNSPECIFIED, THIRD TRIMESTER: ICD-10-CM

## 2021-08-05 LAB — BACTERIA THROAT CULT: NORMAL

## 2021-08-06 ENCOUNTER — APPOINTMENT (OUTPATIENT)
Dept: PULMONOLOGY | Facility: CLINIC | Age: 26
End: 2021-08-06
Payer: MEDICAID

## 2021-08-06 VITALS
BODY MASS INDEX: 33.38 KG/M2 | OXYGEN SATURATION: 99 % | WEIGHT: 170 LBS | SYSTOLIC BLOOD PRESSURE: 110 MMHG | HEART RATE: 91 BPM | HEIGHT: 60 IN | DIASTOLIC BLOOD PRESSURE: 58 MMHG | TEMPERATURE: 98 F

## 2021-08-06 PROCEDURE — 99213 OFFICE O/P EST LOW 20 MIN: CPT | Mod: 25

## 2021-08-06 PROCEDURE — 94727 GAS DIL/WSHOT DETER LNG VOL: CPT

## 2021-08-06 PROCEDURE — 94060 EVALUATION OF WHEEZING: CPT

## 2021-08-06 PROCEDURE — 99203 OFFICE O/P NEW LOW 30 MIN: CPT | Mod: 25

## 2021-08-06 PROCEDURE — 94729 DIFFUSING CAPACITY: CPT

## 2021-08-09 ENCOUNTER — OUTPATIENT (OUTPATIENT)
Dept: OUTPATIENT SERVICES | Facility: HOSPITAL | Age: 26
LOS: 1 days | End: 2021-08-09
Payer: MEDICAID

## 2021-08-09 ENCOUNTER — APPOINTMENT (OUTPATIENT)
Dept: CT IMAGING | Facility: HOSPITAL | Age: 26
End: 2021-08-09
Payer: MEDICAID

## 2021-08-09 DIAGNOSIS — R06.00 DYSPNEA, UNSPECIFIED: ICD-10-CM

## 2021-08-09 PROCEDURE — 71275 CT ANGIOGRAPHY CHEST: CPT | Mod: 26

## 2021-08-09 PROCEDURE — 71275 CT ANGIOGRAPHY CHEST: CPT

## 2021-08-11 ENCOUNTER — INPATIENT (INPATIENT)
Facility: HOSPITAL | Age: 26
LOS: 1 days | Discharge: HOME | DRG: 833 | End: 2021-08-13
Attending: OBSTETRICS & GYNECOLOGY | Admitting: OBSTETRICS & GYNECOLOGY
Payer: MEDICAID

## 2021-08-11 VITALS — HEIGHT: 60 IN | WEIGHT: 169.76 LBS

## 2021-08-11 DIAGNOSIS — Z34.80 ENCOUNTER FOR SUPERVISION OF OTHER NORMAL PREGNANCY, UNSPECIFIED TRIMESTER: ICD-10-CM

## 2021-08-11 DIAGNOSIS — O26.899 OTHER SPECIFIED PREGNANCY RELATED CONDITIONS, UNSPECIFIED TRIMESTER: ICD-10-CM

## 2021-08-11 DIAGNOSIS — Z3A.00 WEEKS OF GESTATION OF PREGNANCY NOT SPECIFIED: ICD-10-CM

## 2021-08-11 LAB
APPEARANCE UR: CLEAR — SIGNIFICANT CHANGE UP
APTT BLD: 29 SEC — SIGNIFICANT CHANGE UP (ref 27.5–35.5)
BASOPHILS # BLD AUTO: 0.02 K/UL — SIGNIFICANT CHANGE UP (ref 0–0.2)
BASOPHILS NFR BLD AUTO: 0.2 % — SIGNIFICANT CHANGE UP (ref 0–2)
BILIRUB UR-MCNC: NEGATIVE — SIGNIFICANT CHANGE UP
BLD GP AB SCN SERPL QL: SIGNIFICANT CHANGE UP
COLOR SPEC: YELLOW — SIGNIFICANT CHANGE UP
DIFF PNL FLD: ABNORMAL
EOSINOPHIL # BLD AUTO: 0.17 K/UL — SIGNIFICANT CHANGE UP (ref 0–0.5)
EOSINOPHIL NFR BLD AUTO: 1.8 % — SIGNIFICANT CHANGE UP (ref 0–6)
FIBRINOGEN PPP-MCNC: 661 MG/DL — HIGH (ref 290–520)
FIBRONECTIN FETAL SPEC QL: POSITIVE
GLUCOSE UR QL: 250
HCT VFR BLD CALC: 27.4 % — LOW (ref 34.5–45)
HGB BLD-MCNC: 9.1 G/DL — LOW (ref 11.5–15.5)
IMM GRANULOCYTES NFR BLD AUTO: 0.8 % — SIGNIFICANT CHANGE UP (ref 0–1.5)
INR BLD: 0.99 RATIO — SIGNIFICANT CHANGE UP (ref 0.88–1.16)
KETONES UR-MCNC: NEGATIVE — SIGNIFICANT CHANGE UP
LEUKOCYTE ESTERASE UR-ACNC: ABNORMAL
LYMPHOCYTES # BLD AUTO: 1.66 K/UL — SIGNIFICANT CHANGE UP (ref 1–3.3)
LYMPHOCYTES # BLD AUTO: 17.4 % — SIGNIFICANT CHANGE UP (ref 13–44)
MCHC RBC-ENTMCNC: 29 PG — SIGNIFICANT CHANGE UP (ref 27–34)
MCHC RBC-ENTMCNC: 33.2 GM/DL — SIGNIFICANT CHANGE UP (ref 32–36)
MCV RBC AUTO: 87.3 FL — SIGNIFICANT CHANGE UP (ref 80–100)
MONOCYTES # BLD AUTO: 0.58 K/UL — SIGNIFICANT CHANGE UP (ref 0–0.9)
MONOCYTES NFR BLD AUTO: 6.1 % — SIGNIFICANT CHANGE UP (ref 2–14)
NEUTROPHILS # BLD AUTO: 7.02 K/UL — SIGNIFICANT CHANGE UP (ref 1.8–7.4)
NEUTROPHILS NFR BLD AUTO: 73.7 % — SIGNIFICANT CHANGE UP (ref 43–77)
NITRITE UR-MCNC: NEGATIVE — SIGNIFICANT CHANGE UP
NRBC # BLD: 0 /100 WBCS — SIGNIFICANT CHANGE UP (ref 0–0)
PH UR: 6.5 — SIGNIFICANT CHANGE UP (ref 5–8)
PLATELET # BLD AUTO: 274 K/UL — SIGNIFICANT CHANGE UP (ref 150–400)
PROT UR-MCNC: 15
PROTHROM AB SERPL-ACNC: 11.8 SEC — SIGNIFICANT CHANGE UP (ref 10.6–13.6)
RBC # BLD: 3.14 M/UL — LOW (ref 3.8–5.2)
RBC # FLD: 13.2 % — SIGNIFICANT CHANGE UP (ref 10.3–14.5)
SARS-COV-2 RNA SPEC QL NAA+PROBE: SIGNIFICANT CHANGE UP
SP GR SPEC: 1.01 — SIGNIFICANT CHANGE UP (ref 1.01–1.02)
UROBILINOGEN FLD QL: NEGATIVE — SIGNIFICANT CHANGE UP
WBC # BLD: 9.53 K/UL — SIGNIFICANT CHANGE UP (ref 3.8–10.5)
WBC # FLD AUTO: 9.53 K/UL — SIGNIFICANT CHANGE UP (ref 3.8–10.5)

## 2021-08-11 PROCEDURE — 76817 TRANSVAGINAL US OBSTETRIC: CPT | Mod: 26

## 2021-08-11 PROCEDURE — 76815 OB US LIMITED FETUS(S): CPT | Mod: 26

## 2021-08-11 RX ORDER — SODIUM CHLORIDE 9 MG/ML
1000 INJECTION, SOLUTION INTRAVENOUS ONCE
Refills: 0 | Status: COMPLETED | OUTPATIENT
Start: 2021-08-11 | End: 2021-08-11

## 2021-08-11 RX ORDER — ACETAMINOPHEN 500 MG
1000 TABLET ORAL ONCE
Refills: 0 | Status: COMPLETED | OUTPATIENT
Start: 2021-08-11 | End: 2021-08-11

## 2021-08-11 RX ORDER — SODIUM CHLORIDE 9 MG/ML
1000 INJECTION, SOLUTION INTRAVENOUS
Refills: 0 | Status: COMPLETED | OUTPATIENT
Start: 2021-08-11 | End: 2021-08-11

## 2021-08-11 RX ORDER — FERROUS SULFATE 325(65) MG
325 TABLET ORAL DAILY
Refills: 0 | Status: DISCONTINUED | OUTPATIENT
Start: 2021-08-12 | End: 2021-08-12

## 2021-08-11 RX ORDER — FOLIC ACID 0.8 MG
1 TABLET ORAL DAILY
Refills: 0 | Status: DISCONTINUED | OUTPATIENT
Start: 2021-08-12 | End: 2021-08-12

## 2021-08-11 RX ADMIN — Medication 12 MILLIGRAM(S): at 21:16

## 2021-08-11 RX ADMIN — Medication 1000 MILLIGRAM(S): at 21:55

## 2021-08-11 RX ADMIN — SODIUM CHLORIDE 1000 MILLILITER(S): 9 INJECTION, SOLUTION INTRAVENOUS at 18:45

## 2021-08-11 RX ADMIN — Medication 400 MILLIGRAM(S): at 21:16

## 2021-08-11 RX ADMIN — SODIUM CHLORIDE 1000 MILLILITER(S): 9 INJECTION, SOLUTION INTRAVENOUS at 22:30

## 2021-08-12 LAB — T PALLIDUM AB TITR SER: NEGATIVE — SIGNIFICANT CHANGE UP

## 2021-08-12 RX ORDER — AMPICILLIN TRIHYDRATE 250 MG
2 CAPSULE ORAL ONCE
Refills: 0 | Status: DISCONTINUED | OUTPATIENT
Start: 2021-08-12 | End: 2021-08-12

## 2021-08-12 RX ORDER — SODIUM CHLORIDE 9 MG/ML
1000 INJECTION, SOLUTION INTRAVENOUS
Refills: 0 | Status: DISCONTINUED | OUTPATIENT
Start: 2021-08-12 | End: 2021-08-13

## 2021-08-12 RX ORDER — AMPICILLIN TRIHYDRATE 250 MG
CAPSULE ORAL
Refills: 0 | Status: DISCONTINUED | OUTPATIENT
Start: 2021-08-12 | End: 2021-08-12

## 2021-08-12 RX ORDER — LEVOTHYROXINE SODIUM 125 MCG
75 TABLET ORAL DAILY
Refills: 0 | Status: DISCONTINUED | OUTPATIENT
Start: 2021-08-12 | End: 2021-08-13

## 2021-08-12 RX ORDER — AMPICILLIN TRIHYDRATE 250 MG
1 CAPSULE ORAL EVERY 6 HOURS
Refills: 0 | Status: DISCONTINUED | OUTPATIENT
Start: 2021-08-13 | End: 2021-08-13

## 2021-08-12 RX ORDER — AMPICILLIN TRIHYDRATE 250 MG
2 CAPSULE ORAL ONCE
Refills: 0 | Status: COMPLETED | OUTPATIENT
Start: 2021-08-12 | End: 2021-08-12

## 2021-08-12 RX ORDER — AMPICILLIN TRIHYDRATE 250 MG
1 CAPSULE ORAL EVERY 4 HOURS
Refills: 0 | Status: DISCONTINUED | OUTPATIENT
Start: 2021-08-12 | End: 2021-08-12

## 2021-08-12 RX ORDER — AMPICILLIN TRIHYDRATE 250 MG
2 CAPSULE ORAL
Refills: 0 | Status: DISCONTINUED | OUTPATIENT
Start: 2021-08-12 | End: 2021-08-13

## 2021-08-12 RX ADMIN — Medication 75 MICROGRAM(S): at 09:03

## 2021-08-12 RX ADMIN — Medication 12 MILLIGRAM(S): at 21:25

## 2021-08-12 RX ADMIN — SODIUM CHLORIDE 125 MILLILITER(S): 9 INJECTION, SOLUTION INTRAVENOUS at 05:00

## 2021-08-12 RX ADMIN — Medication 216 GRAM(S): at 23:29

## 2021-08-13 ENCOUNTER — TRANSCRIPTION ENCOUNTER (OUTPATIENT)
Age: 26
End: 2021-08-13

## 2021-08-13 LAB
C TRACH RRNA SPEC QL NAA+PROBE: SIGNIFICANT CHANGE UP
GROUP B BETA STREP DNA (PCR): DETECTED
GROUP B BETA STREP INTERPRETATION: SIGNIFICANT CHANGE UP
N GONORRHOEA RRNA SPEC QL NAA+PROBE: SIGNIFICANT CHANGE UP
SOURCE GROUP B STREP: SIGNIFICANT CHANGE UP
SPECIMEN SOURCE: SIGNIFICANT CHANGE UP

## 2021-08-13 PROCEDURE — 87591 N.GONORRHOEAE DNA AMP PROB: CPT

## 2021-08-13 PROCEDURE — 86900 BLOOD TYPING SEROLOGIC ABO: CPT

## 2021-08-13 PROCEDURE — 85730 THROMBOPLASTIN TIME PARTIAL: CPT

## 2021-08-13 PROCEDURE — 36415 COLL VENOUS BLD VENIPUNCTURE: CPT

## 2021-08-13 PROCEDURE — 85384 FIBRINOGEN ACTIVITY: CPT

## 2021-08-13 PROCEDURE — 87491 CHLMYD TRACH DNA AMP PROBE: CPT

## 2021-08-13 PROCEDURE — 85025 COMPLETE CBC W/AUTO DIFF WBC: CPT

## 2021-08-13 PROCEDURE — G0463: CPT

## 2021-08-13 PROCEDURE — 76815 OB US LIMITED FETUS(S): CPT

## 2021-08-13 PROCEDURE — 82731 ASSAY OF FETAL FIBRONECTIN: CPT

## 2021-08-13 PROCEDURE — 86850 RBC ANTIBODY SCREEN: CPT

## 2021-08-13 PROCEDURE — 87635 SARS-COV-2 COVID-19 AMP PRB: CPT

## 2021-08-13 PROCEDURE — 76817 TRANSVAGINAL US OBSTETRIC: CPT

## 2021-08-13 PROCEDURE — 86901 BLOOD TYPING SEROLOGIC RH(D): CPT

## 2021-08-13 PROCEDURE — 85610 PROTHROMBIN TIME: CPT

## 2021-08-13 PROCEDURE — 86780 TREPONEMA PALLIDUM: CPT

## 2021-08-13 PROCEDURE — 59025 FETAL NON-STRESS TEST: CPT

## 2021-08-13 PROCEDURE — 81001 URINALYSIS AUTO W/SCOPE: CPT

## 2021-08-13 PROCEDURE — 87653 STREP B DNA AMP PROBE: CPT

## 2021-08-13 RX ADMIN — SODIUM CHLORIDE 125 MILLILITER(S): 9 INJECTION, SOLUTION INTRAVENOUS at 06:43

## 2021-08-13 RX ADMIN — Medication 75 MICROGRAM(S): at 06:58

## 2021-08-13 RX ADMIN — SODIUM CHLORIDE 125 MILLILITER(S): 9 INJECTION, SOLUTION INTRAVENOUS at 00:22

## 2021-08-13 RX ADMIN — Medication 208 GRAM(S): at 05:31

## 2021-08-13 NOTE — DISCHARGE NOTE OB - HOSPITAL COURSE
26 year old  at 33.2 weeks admitted for r/o  labor. pt admitted and given beta x 2 and antibiotics. Pt had cultures sent. Pt was reexamined by Dr. Norris and had no evidence of  labor. pt was cleared for discharge home with close follow up in the clinic

## 2021-08-13 NOTE — DISCHARGE NOTE OB - MEDICATION SUMMARY - MEDICATIONS TO TAKE
I will START or STAY ON the medications listed below when I get home from the hospital:    Prenatal Multivitamins with Folic Acid 1 mg oral tablet  -- 1 tab(s) by mouth once a day  -- Indication: For Supplement

## 2021-08-13 NOTE — DISCHARGE NOTE OB - MEDICATION SUMMARY - MEDICATIONS TO STOP TAKING
I will STOP taking the medications listed below when I get home from the hospital:    oseltamivir 75 mg oral capsule  -- 1 cap(s) by mouth 2 times a day    ferrous sulfate 325 mg (65 mg elemental iron) oral tablet  -- 1 tab(s) by mouth once a day   -- Check with your doctor before becoming pregnant.  Do not chew, break, or crush.  May discolor urine or feces.    ibuprofen 600 mg oral tablet  -- 1 tab(s) by mouth every 6 hours, As Needed -for moderate pain   -- Do not take this drug if you are pregnant.  It is very important that you take or use this exactly as directed.  Do not skip doses or discontinue unless directed by your doctor.  May cause drowsiness or dizziness.  Obtain medical advice before taking any non-prescription drugs as some may affect the action of this medication.  Take with food or milk.    Cara-Colace 50 mg-8.6 mg oral tablet  -- 2 tab(s) by mouth once a day (at bedtime)   -- Medication should be taken with plenty of water.

## 2021-08-13 NOTE — DISCHARGE NOTE OB - CARE PROVIDER_API CALL
Stephanie Dey)  Obstetrics and Gynecology  [address] [place] [address]  Phone: [phone]  Fax: [phone]  Follow Up Time:

## 2021-08-13 NOTE — DISCHARGE NOTE OB - ADDITIONAL INSTRUCTIONS
-discharge home   - labor precautions reviewed: return if any contractions, leakage of fluids or vaginal bleeding   -adequate hydration with 2L fluid  -fetal kick count instructions given   -follow up in 2-3 days in office

## 2021-08-13 NOTE — DISCHARGE NOTE OB - CARE PLAN
1 Principal Discharge DX:	33 weeks gestation of pregnancy  Assessment and plan of treatment:	-discharge home   - labor precautions reviewed: return if any contractions, leakage of fluids or vaginal bleeding   -adequate hydration with 2L fluid  -fetal kick count instructions given   -follow up in 2-3 days in office  Secondary Diagnosis:	 contractions  Assessment and plan of treatment:	-discharge home   - labor precautions reviewed: return if any contractions, leakage of fluids or vaginal bleeding   -adequate hydration with 2L fluid  -fetal kick count instructions given   -follow up in 2-3 days in office

## 2021-08-13 NOTE — DISCHARGE NOTE OB - IF BREASTFEEDING, HAVE A GLASS OF WATER, JUICE, OR LOW-FAT MILK EACH TIME YOU FEED YOUR BABY
pt received sitting in chair, +IVL, +lumbar drain (clamped), +ICU monitoring, feeling tired but willing to ambulate and then back to bed. Pt is A&Ox4, reports L eye improving.
Statement Selected

## 2021-08-13 NOTE — DISCHARGE NOTE OB - PATIENT PORTAL LINK FT
You can access the FollowMyHealth Patient Portal offered by [facility] by registering at the following website: [email or web address]. By joining [employer]’s FollowMyHealth portal, you will also be able to view your health information using other applications (apps) compatible with our system.

## 2021-08-14 NOTE — CONSULT LETTER
[Dear  ___] : Dear  [unfilled], [Courtesy Letter:] : I had the pleasure of seeing your patient, [unfilled], in my office today. [Please see my note below.] : Please see my note below. [Consult Closing:] : Thank you very much for allowing me to participate in the care of this patient.  If you have any questions, please do not hesitate to contact me. [Sincerely,] : Sincerely, [DrJen  ___] : Dr. TAY

## 2021-08-14 NOTE — HISTORY OF PRESENT ILLNESS
[Never] : never [TextBox_4] : 27 yo female presents for evaluation of productive cough with chest heaviness when laying down.She denies cough or hemoptysis. The patient is 32 weeks pregnant, seen in the ER two weeks ago, noted to have increased D-dimer . CTPA was "suboptimal" , subsequently treated and discharged on albuterol. She denies prior pulmonary problems but has seasonal allergies.  [TextBox_29] : Denies snoring, daytime somnolence, apneic episodes, AM headaches

## 2021-08-14 NOTE — REVIEW OF SYSTEMS
[Cough] : cough [Chest Tightness] : chest tightness [Sputum] : sputum [Thyroid Problem] : thyroid problem [Negative] : Psychiatric

## 2021-08-14 NOTE — DISCUSSION/SUMMARY
[FreeTextEntry1] : 27 yo female  with complaints likely related to airway hyperreactivity. I reviewed the PFT results with the patient . ICS was prescribed with PRN albuterol MDI.Treatment adjustment will depend on symptomatic needs. I reviewed the CTPA images on line and discussed the findings with the patient. Despite the limited study,there is no apparent filling defect. A repeat study is scheduled however, as per the patient. She is to follow up with her OB and PMD.

## 2021-08-16 ENCOUNTER — OUTPATIENT (OUTPATIENT)
Dept: OUTPATIENT SERVICES | Facility: HOSPITAL | Age: 26
LOS: 1 days | End: 2021-08-16
Payer: MEDICAID

## 2021-08-16 ENCOUNTER — APPOINTMENT (OUTPATIENT)
Dept: OBGYN | Facility: CLINIC | Age: 26
End: 2021-08-16
Payer: MEDICAID

## 2021-08-16 VITALS
WEIGHT: 174 LBS | SYSTOLIC BLOOD PRESSURE: 97 MMHG | BODY MASS INDEX: 34.16 KG/M2 | HEART RATE: 86 BPM | DIASTOLIC BLOOD PRESSURE: 59 MMHG | TEMPERATURE: 98.2 F | OXYGEN SATURATION: 98 % | RESPIRATION RATE: 18 BRPM | HEIGHT: 60 IN

## 2021-08-16 DIAGNOSIS — Z34.00 ENCOUNTER FOR SUPERVISION OF NORMAL FIRST PREGNANCY, UNSPECIFIED TRIMESTER: ICD-10-CM

## 2021-08-16 PROCEDURE — G0463: CPT

## 2021-08-16 PROCEDURE — 99213 OFFICE O/P EST LOW 20 MIN: CPT

## 2021-08-16 PROCEDURE — 81003 URINALYSIS AUTO W/O SCOPE: CPT

## 2021-08-17 ENCOUNTER — APPOINTMENT (OUTPATIENT)
Dept: ANTEPARTUM | Facility: CLINIC | Age: 26
End: 2021-08-17
Payer: MEDICAID

## 2021-08-17 ENCOUNTER — ASOB RESULT (OUTPATIENT)
Age: 26
End: 2021-08-17

## 2021-08-17 DIAGNOSIS — Z3A.33 33 WEEKS GESTATION OF PREGNANCY: ICD-10-CM

## 2021-08-17 DIAGNOSIS — Z34.93 ENCOUNTER FOR SUPERVISION OF NORMAL PREGNANCY, UNSPECIFIED, THIRD TRIMESTER: ICD-10-CM

## 2021-08-17 DIAGNOSIS — E03.9 HYPOTHYROIDISM, UNSPECIFIED: ICD-10-CM

## 2021-08-17 PROCEDURE — 76816 OB US FOLLOW-UP PER FETUS: CPT

## 2021-08-19 ENCOUNTER — TRANSCRIPTION ENCOUNTER (OUTPATIENT)
Age: 26
End: 2021-08-19

## 2021-08-23 ENCOUNTER — OUTPATIENT (OUTPATIENT)
Dept: OUTPATIENT SERVICES | Facility: HOSPITAL | Age: 26
LOS: 1 days | End: 2021-08-23
Payer: MEDICAID

## 2021-08-23 DIAGNOSIS — O26.899 OTHER SPECIFIED PREGNANCY RELATED CONDITIONS, UNSPECIFIED TRIMESTER: ICD-10-CM

## 2021-08-23 DIAGNOSIS — Z3A.00 WEEKS OF GESTATION OF PREGNANCY NOT SPECIFIED: ICD-10-CM

## 2021-08-23 PROCEDURE — 76819 FETAL BIOPHYS PROFIL W/O NST: CPT

## 2021-08-23 PROCEDURE — 76819 FETAL BIOPHYS PROFIL W/O NST: CPT | Mod: 26

## 2021-08-23 PROCEDURE — 59025 FETAL NON-STRESS TEST: CPT

## 2021-08-23 PROCEDURE — G0463: CPT

## 2021-08-30 ENCOUNTER — OUTPATIENT (OUTPATIENT)
Dept: OUTPATIENT SERVICES | Facility: HOSPITAL | Age: 26
LOS: 1 days | End: 2021-08-30
Payer: MEDICAID

## 2021-08-30 ENCOUNTER — APPOINTMENT (OUTPATIENT)
Dept: OBGYN | Facility: CLINIC | Age: 26
End: 2021-08-30
Payer: MEDICAID

## 2021-08-30 VITALS
HEART RATE: 80 BPM | BODY MASS INDEX: 33.96 KG/M2 | SYSTOLIC BLOOD PRESSURE: 97 MMHG | WEIGHT: 173 LBS | TEMPERATURE: 97.2 F | HEIGHT: 60 IN | OXYGEN SATURATION: 98 % | DIASTOLIC BLOOD PRESSURE: 58 MMHG

## 2021-08-30 DIAGNOSIS — Z34.00 ENCOUNTER FOR SUPERVISION OF NORMAL FIRST PREGNANCY, UNSPECIFIED TRIMESTER: ICD-10-CM

## 2021-08-30 LAB
T4 FREE SERPL-MCNC: 1.3 NG/DL
TSH SERPL-ACNC: 7.17 UIU/ML

## 2021-08-30 PROCEDURE — 81003 URINALYSIS AUTO W/O SCOPE: CPT

## 2021-08-30 PROCEDURE — G0463: CPT

## 2021-08-30 PROCEDURE — 84439 ASSAY OF FREE THYROXINE: CPT

## 2021-08-30 PROCEDURE — 84443 ASSAY THYROID STIM HORMONE: CPT

## 2021-08-30 PROCEDURE — 99213 OFFICE O/P EST LOW 20 MIN: CPT

## 2021-08-31 ENCOUNTER — APPOINTMENT (OUTPATIENT)
Dept: ENDOCRINOLOGY | Facility: CLINIC | Age: 26
End: 2021-08-31
Payer: MEDICAID

## 2021-08-31 VITALS
DIASTOLIC BLOOD PRESSURE: 61 MMHG | HEART RATE: 83 BPM | WEIGHT: 168 LBS | BODY MASS INDEX: 32.81 KG/M2 | SYSTOLIC BLOOD PRESSURE: 97 MMHG

## 2021-08-31 PROCEDURE — 99213 OFFICE O/P EST LOW 20 MIN: CPT

## 2021-08-31 RX ORDER — LEVOTHYROXINE SODIUM 0.07 MG/1
75 TABLET ORAL DAILY
Qty: 30 | Refills: 2 | Status: COMPLETED | COMMUNITY
Start: 2021-06-22 | End: 2021-08-31

## 2021-09-01 DIAGNOSIS — Z3A.35 35 WEEKS GESTATION OF PREGNANCY: ICD-10-CM

## 2021-09-01 DIAGNOSIS — Z34.93 ENCOUNTER FOR SUPERVISION OF NORMAL PREGNANCY, UNSPECIFIED, THIRD TRIMESTER: ICD-10-CM

## 2021-09-03 ENCOUNTER — APPOINTMENT (OUTPATIENT)
Dept: ENDOCRINOLOGY | Facility: CLINIC | Age: 26
End: 2021-09-03

## 2021-09-09 ENCOUNTER — APPOINTMENT (OUTPATIENT)
Dept: OBGYN | Facility: CLINIC | Age: 26
End: 2021-09-09
Payer: MEDICAID

## 2021-09-09 ENCOUNTER — ASOB RESULT (OUTPATIENT)
Age: 26
End: 2021-09-09

## 2021-09-09 ENCOUNTER — OUTPATIENT (OUTPATIENT)
Dept: OUTPATIENT SERVICES | Facility: HOSPITAL | Age: 26
LOS: 1 days | End: 2021-09-09
Payer: MEDICAID

## 2021-09-09 ENCOUNTER — APPOINTMENT (OUTPATIENT)
Dept: ANTEPARTUM | Facility: CLINIC | Age: 26
End: 2021-09-09
Payer: MEDICAID

## 2021-09-09 VITALS
TEMPERATURE: 97.2 F | BODY MASS INDEX: 33.57 KG/M2 | SYSTOLIC BLOOD PRESSURE: 91 MMHG | DIASTOLIC BLOOD PRESSURE: 53 MMHG | HEIGHT: 60 IN | OXYGEN SATURATION: 97 % | HEART RATE: 80 BPM | RESPIRATION RATE: 18 BRPM | WEIGHT: 171 LBS

## 2021-09-09 DIAGNOSIS — Z34.00 ENCOUNTER FOR SUPERVISION OF NORMAL FIRST PREGNANCY, UNSPECIFIED TRIMESTER: ICD-10-CM

## 2021-09-09 PROCEDURE — 87389 HIV-1 AG W/HIV-1&-2 AB AG IA: CPT

## 2021-09-09 PROCEDURE — 85025 COMPLETE CBC W/AUTO DIFF WBC: CPT

## 2021-09-09 PROCEDURE — 81003 URINALYSIS AUTO W/O SCOPE: CPT

## 2021-09-09 PROCEDURE — G0463: CPT

## 2021-09-09 PROCEDURE — 87653 STREP B DNA AMP PROBE: CPT

## 2021-09-09 PROCEDURE — 76819 FETAL BIOPHYS PROFIL W/O NST: CPT

## 2021-09-09 PROCEDURE — 86780 TREPONEMA PALLIDUM: CPT

## 2021-09-09 PROCEDURE — 80053 COMPREHEN METABOLIC PANEL: CPT

## 2021-09-09 PROCEDURE — 87086 URINE CULTURE/COLONY COUNT: CPT

## 2021-09-09 PROCEDURE — 99213 OFFICE O/P EST LOW 20 MIN: CPT

## 2021-09-09 PROCEDURE — 84156 ASSAY OF PROTEIN URINE: CPT

## 2021-09-10 DIAGNOSIS — Z34.93 ENCOUNTER FOR SUPERVISION OF NORMAL PREGNANCY, UNSPECIFIED, THIRD TRIMESTER: ICD-10-CM

## 2021-09-10 DIAGNOSIS — Z3A.37 37 WEEKS GESTATION OF PREGNANCY: ICD-10-CM

## 2021-09-13 LAB
ALBUMIN SERPL ELPH-MCNC: 3.4 G/DL
ALP BLD-CCNC: 167 U/L
ALT SERPL-CCNC: 7 U/L
ANION GAP SERPL CALC-SCNC: 11 MMOL/L
AST SERPL-CCNC: 11 U/L
BACTERIA UR CULT: NORMAL
BASOPHILS # BLD AUTO: 0.03 K/UL
BASOPHILS NFR BLD AUTO: 0.3 %
BILIRUB SERPL-MCNC: 0.2 MG/DL
BUN SERPL-MCNC: 8 MG/DL
C TRACH RRNA SPEC QL NAA+PROBE: NOT DETECTED
CALCIUM SERPL-MCNC: 8.3 MG/DL
CHLORIDE SERPL-SCNC: 106 MMOL/L
CO2 SERPL-SCNC: 20 MMOL/L
CREAT SERPL-MCNC: 0.59 MG/DL
CREAT SPEC-SCNC: 222 MG/DL
CREAT/PROT UR: 0.3 RATIO
EOSINOPHIL # BLD AUTO: 0.13 K/UL
EOSINOPHIL NFR BLD AUTO: 1.3 %
GLUCOSE SERPL-MCNC: 81 MG/DL
GP B STREP DNA SPEC QL NAA+PROBE: DETECTED
GP B STREP DNA SPEC QL NAA+PROBE: NORMAL
HCT VFR BLD CALC: 31.1 %
HGB BLD-MCNC: 9.9 G/DL
HIV1+2 AB SPEC QL IA.RAPID: NONREACTIVE
IMM GRANULOCYTES NFR BLD AUTO: 0.6 %
LYMPHOCYTES # BLD AUTO: 2.07 K/UL
LYMPHOCYTES NFR BLD AUTO: 21.3 %
MAN DIFF?: NORMAL
MCHC RBC-ENTMCNC: 27 PG
MCHC RBC-ENTMCNC: 31.8 GM/DL
MCV RBC AUTO: 84.7 FL
MONOCYTES # BLD AUTO: 0.56 K/UL
MONOCYTES NFR BLD AUTO: 5.8 %
N GONORRHOEA RRNA SPEC QL NAA+PROBE: NOT DETECTED
NEUTROPHILS # BLD AUTO: 6.88 K/UL
NEUTROPHILS NFR BLD AUTO: 70.7 %
PLATELET # BLD AUTO: 383 K/UL
POTASSIUM SERPL-SCNC: 3.8 MMOL/L
PROT SERPL-MCNC: 6.1 G/DL
PROT UR-MCNC: 69 MG/DL
RBC # BLD: 3.67 M/UL
RBC # FLD: 13.6 %
SODIUM SERPL-SCNC: 137 MMOL/L
SOURCE AMPLIFICATION: NORMAL
SOURCE GBS: NORMAL
T PALLIDUM AB SER QL IA: NEGATIVE
WBC # FLD AUTO: 9.73 K/UL

## 2021-09-16 ENCOUNTER — OUTPATIENT (OUTPATIENT)
Dept: OUTPATIENT SERVICES | Facility: HOSPITAL | Age: 26
LOS: 1 days | End: 2021-09-16
Payer: MEDICAID

## 2021-09-16 ENCOUNTER — APPOINTMENT (OUTPATIENT)
Dept: OBGYN | Facility: CLINIC | Age: 26
End: 2021-09-16
Payer: MEDICAID

## 2021-09-16 ENCOUNTER — ASOB RESULT (OUTPATIENT)
Age: 26
End: 2021-09-16

## 2021-09-16 ENCOUNTER — APPOINTMENT (OUTPATIENT)
Dept: ANTEPARTUM | Facility: CLINIC | Age: 26
End: 2021-09-16
Payer: MEDICAID

## 2021-09-16 VITALS
DIASTOLIC BLOOD PRESSURE: 59 MMHG | BODY MASS INDEX: 34.36 KG/M2 | OXYGEN SATURATION: 98 % | WEIGHT: 175 LBS | SYSTOLIC BLOOD PRESSURE: 97 MMHG | HEART RATE: 91 BPM | HEIGHT: 60 IN | TEMPERATURE: 97.2 F

## 2021-09-16 DIAGNOSIS — Z34.00 ENCOUNTER FOR SUPERVISION OF NORMAL FIRST PREGNANCY, UNSPECIFIED TRIMESTER: ICD-10-CM

## 2021-09-16 LAB
BASOPHILS # BLD AUTO: 0.02 K/UL
BASOPHILS NFR BLD AUTO: 0.2 %
EOSINOPHIL # BLD AUTO: 0.07 K/UL
EOSINOPHIL NFR BLD AUTO: 0.8 %
HCT VFR BLD CALC: 29.5 %
HGB BLD-MCNC: 9.5 G/DL
IMM GRANULOCYTES NFR BLD AUTO: 0.9 %
INR PPP: 0.99 RATIO
LYMPHOCYTES # BLD AUTO: 1.73 K/UL
LYMPHOCYTES NFR BLD AUTO: 19.7 %
MAN DIFF?: NORMAL
MCHC RBC-ENTMCNC: 26.9 PG
MCHC RBC-ENTMCNC: 32.2 GM/DL
MCV RBC AUTO: 83.6 FL
MONOCYTES # BLD AUTO: 0.49 K/UL
MONOCYTES NFR BLD AUTO: 5.6 %
NEUTROPHILS # BLD AUTO: 6.41 K/UL
NEUTROPHILS NFR BLD AUTO: 72.8 %
PLATELET # BLD AUTO: 252 K/UL
PT BLD: 11.8 SEC
RBC # BLD: 3.53 M/UL
RBC # FLD: 13.9 %
WBC # FLD AUTO: 8.8 K/UL

## 2021-09-16 PROCEDURE — 84439 ASSAY OF FREE THYROXINE: CPT

## 2021-09-16 PROCEDURE — 99213 OFFICE O/P EST LOW 20 MIN: CPT

## 2021-09-16 PROCEDURE — 85610 PROTHROMBIN TIME: CPT

## 2021-09-16 PROCEDURE — 84550 ASSAY OF BLOOD/URIC ACID: CPT

## 2021-09-16 PROCEDURE — 80053 COMPREHEN METABOLIC PANEL: CPT

## 2021-09-16 PROCEDURE — G0463: CPT

## 2021-09-16 PROCEDURE — 84443 ASSAY THYROID STIM HORMONE: CPT

## 2021-09-16 PROCEDURE — 85025 COMPLETE CBC W/AUTO DIFF WBC: CPT

## 2021-09-16 PROCEDURE — 76816 OB US FOLLOW-UP PER FETUS: CPT

## 2021-09-16 PROCEDURE — 81003 URINALYSIS AUTO W/O SCOPE: CPT

## 2021-09-18 ENCOUNTER — OUTPATIENT (OUTPATIENT)
Dept: OUTPATIENT SERVICES | Facility: HOSPITAL | Age: 26
LOS: 1 days | End: 2021-09-18
Payer: MEDICAID

## 2021-09-18 DIAGNOSIS — O26.899 OTHER SPECIFIED PREGNANCY RELATED CONDITIONS, UNSPECIFIED TRIMESTER: ICD-10-CM

## 2021-09-18 DIAGNOSIS — Z3A.00 WEEKS OF GESTATION OF PREGNANCY NOT SPECIFIED: ICD-10-CM

## 2021-09-18 PROCEDURE — G0463: CPT

## 2021-09-18 PROCEDURE — 59025 FETAL NON-STRESS TEST: CPT

## 2021-09-20 ENCOUNTER — INPATIENT (INPATIENT)
Facility: HOSPITAL | Age: 26
LOS: 1 days | Discharge: HOME | End: 2021-09-22
Attending: OBSTETRICS & GYNECOLOGY | Admitting: OBSTETRICS & GYNECOLOGY
Payer: MEDICAID

## 2021-09-20 DIAGNOSIS — Z3A.38 38 WEEKS GESTATION OF PREGNANCY: ICD-10-CM

## 2021-09-20 DIAGNOSIS — Z34.80 ENCOUNTER FOR SUPERVISION OF OTHER NORMAL PREGNANCY, UNSPECIFIED TRIMESTER: ICD-10-CM

## 2021-09-20 DIAGNOSIS — O26.899 OTHER SPECIFIED PREGNANCY RELATED CONDITIONS, UNSPECIFIED TRIMESTER: ICD-10-CM

## 2021-09-20 DIAGNOSIS — Z34.93 ENCOUNTER FOR SUPERVISION OF NORMAL PREGNANCY, UNSPECIFIED, THIRD TRIMESTER: ICD-10-CM

## 2021-09-20 DIAGNOSIS — Z3A.00 WEEKS OF GESTATION OF PREGNANCY NOT SPECIFIED: ICD-10-CM

## 2021-09-20 LAB
ALBUMIN SERPL ELPH-MCNC: 3.2 G/DL
ALP BLD-CCNC: 181 U/L
ALT SERPL-CCNC: 7 U/L
ANION GAP SERPL CALC-SCNC: 13 MMOL/L
APTT BLD: 29 SEC — SIGNIFICANT CHANGE UP (ref 27.5–35.5)
AST SERPL-CCNC: 12 U/L
BASOPHILS # BLD AUTO: 0.02 K/UL — SIGNIFICANT CHANGE UP (ref 0–0.2)
BASOPHILS NFR BLD AUTO: 0.2 % — SIGNIFICANT CHANGE UP (ref 0–2)
BILIRUB SERPL-MCNC: 0.2 MG/DL
BLD GP AB SCN SERPL QL: SIGNIFICANT CHANGE UP
BUN SERPL-MCNC: 8 MG/DL
CALCIUM SERPL-MCNC: 8.7 MG/DL
CHLORIDE SERPL-SCNC: 106 MMOL/L
CO2 SERPL-SCNC: 19 MMOL/L
CREAT SERPL-MCNC: 0.72 MG/DL
EOSINOPHIL # BLD AUTO: 0.12 K/UL — SIGNIFICANT CHANGE UP (ref 0–0.5)
EOSINOPHIL NFR BLD AUTO: 1.1 % — SIGNIFICANT CHANGE UP (ref 0–6)
FIBRINOGEN PPP-MCNC: 786 MG/DL — HIGH (ref 290–520)
GLUCOSE SERPL-MCNC: 121 MG/DL
HCT VFR BLD CALC: 31.3 % — LOW (ref 34.5–45)
HGB BLD-MCNC: 10.1 G/DL — LOW (ref 11.5–15.5)
IMM GRANULOCYTES NFR BLD AUTO: 0.9 % — SIGNIFICANT CHANGE UP (ref 0–1.5)
INR BLD: 1.02 RATIO — SIGNIFICANT CHANGE UP (ref 0.88–1.16)
LYMPHOCYTES # BLD AUTO: 1.69 K/UL — SIGNIFICANT CHANGE UP (ref 1–3.3)
LYMPHOCYTES # BLD AUTO: 16.1 % — SIGNIFICANT CHANGE UP (ref 13–44)
MCHC RBC-ENTMCNC: 26.5 PG — LOW (ref 27–34)
MCHC RBC-ENTMCNC: 32.3 GM/DL — SIGNIFICANT CHANGE UP (ref 32–36)
MCV RBC AUTO: 82.2 FL — SIGNIFICANT CHANGE UP (ref 80–100)
MONOCYTES # BLD AUTO: 0.68 K/UL — SIGNIFICANT CHANGE UP (ref 0–0.9)
MONOCYTES NFR BLD AUTO: 6.5 % — SIGNIFICANT CHANGE UP (ref 2–14)
NEUTROPHILS # BLD AUTO: 7.88 K/UL — HIGH (ref 1.8–7.4)
NEUTROPHILS NFR BLD AUTO: 75.2 % — SIGNIFICANT CHANGE UP (ref 43–77)
NRBC # BLD: 0 /100 WBCS — SIGNIFICANT CHANGE UP (ref 0–0)
PLATELET # BLD AUTO: 251 K/UL — SIGNIFICANT CHANGE UP (ref 150–400)
POTASSIUM SERPL-SCNC: 3.3 MMOL/L
PROT SERPL-MCNC: 5.7 G/DL
PROTHROM AB SERPL-ACNC: 12.1 SEC — SIGNIFICANT CHANGE UP (ref 10.6–13.6)
RBC # BLD: 3.81 M/UL — SIGNIFICANT CHANGE UP (ref 3.8–5.2)
RBC # FLD: 13.9 % — SIGNIFICANT CHANGE UP (ref 10.3–14.5)
SARS-COV-2 RNA SPEC QL NAA+PROBE: SIGNIFICANT CHANGE UP
SODIUM SERPL-SCNC: 138 MMOL/L
T4 FREE SERPL-MCNC: 1.2 NG/DL
TSH SERPL-ACNC: 4.92 UIU/ML
URATE SERPL-MCNC: 3.8 MG/DL
WBC # BLD: 10.48 K/UL — SIGNIFICANT CHANGE UP (ref 3.8–10.5)
WBC # FLD AUTO: 10.48 K/UL — SIGNIFICANT CHANGE UP (ref 3.8–10.5)

## 2021-09-20 RX ORDER — AMPICILLIN TRIHYDRATE 250 MG
2 CAPSULE ORAL ONCE
Refills: 0 | Status: COMPLETED | OUTPATIENT
Start: 2021-09-20 | End: 2021-09-20

## 2021-09-20 RX ORDER — AMPICILLIN TRIHYDRATE 250 MG
1 CAPSULE ORAL EVERY 4 HOURS
Refills: 0 | Status: DISCONTINUED | OUTPATIENT
Start: 2021-09-20 | End: 2021-09-21

## 2021-09-20 RX ORDER — OXYTOCIN 10 UNIT/ML
2 VIAL (ML) INJECTION
Qty: 30 | Refills: 0 | Status: DISCONTINUED | OUTPATIENT
Start: 2021-09-20 | End: 2021-09-21

## 2021-09-20 RX ORDER — SODIUM CHLORIDE 9 MG/ML
1000 INJECTION, SOLUTION INTRAVENOUS
Refills: 0 | Status: DISCONTINUED | OUTPATIENT
Start: 2021-09-20 | End: 2021-09-21

## 2021-09-20 RX ORDER — CITRIC ACID/SODIUM CITRATE 300-500 MG
15 SOLUTION, ORAL ORAL EVERY 6 HOURS
Refills: 0 | Status: DISCONTINUED | OUTPATIENT
Start: 2021-09-20 | End: 2021-09-21

## 2021-09-20 RX ADMIN — SODIUM CHLORIDE 125 MILLILITER(S): 9 INJECTION, SOLUTION INTRAVENOUS at 21:30

## 2021-09-20 RX ADMIN — Medication 2 MILLIUNIT(S)/MIN: at 23:15

## 2021-09-20 RX ADMIN — Medication 216 GRAM(S): at 18:14

## 2021-09-20 RX ADMIN — Medication 108 GRAM(S): at 22:15

## 2021-09-21 ENCOUNTER — TRANSCRIPTION ENCOUNTER (OUTPATIENT)
Age: 26
End: 2021-09-21

## 2021-09-21 VITALS
HEART RATE: 94 BPM | SYSTOLIC BLOOD PRESSURE: 98 MMHG | TEMPERATURE: 98 F | DIASTOLIC BLOOD PRESSURE: 53 MMHG | OXYGEN SATURATION: 99 % | RESPIRATION RATE: 20 BRPM

## 2021-09-21 LAB
HCT VFR BLD CALC: 26.4 % — LOW (ref 34.5–45)
HGB BLD-MCNC: 8.4 G/DL — LOW (ref 11.5–15.5)
MCHC RBC-ENTMCNC: 26.6 PG — LOW (ref 27–34)
MCHC RBC-ENTMCNC: 31.8 GM/DL — LOW (ref 32–36)
MCV RBC AUTO: 83.5 FL — SIGNIFICANT CHANGE UP (ref 80–100)
NRBC # BLD: 0 /100 WBCS — SIGNIFICANT CHANGE UP (ref 0–0)
PLATELET # BLD AUTO: 228 K/UL — SIGNIFICANT CHANGE UP (ref 150–400)
RBC # BLD: 3.16 M/UL — LOW (ref 3.8–5.2)
RBC # FLD: 14.1 % — SIGNIFICANT CHANGE UP (ref 10.3–14.5)
T PALLIDUM AB TITR SER: NEGATIVE — SIGNIFICANT CHANGE UP
WBC # BLD: 10.68 K/UL — HIGH (ref 3.8–10.5)
WBC # FLD AUTO: 10.68 K/UL — HIGH (ref 3.8–10.5)

## 2021-09-21 RX ORDER — IBUPROFEN 200 MG
600 TABLET ORAL EVERY 6 HOURS
Refills: 0 | Status: DISCONTINUED | OUTPATIENT
Start: 2021-09-21 | End: 2021-09-22

## 2021-09-21 RX ORDER — MAGNESIUM HYDROXIDE 400 MG/1
30 TABLET, CHEWABLE ORAL
Refills: 0 | Status: DISCONTINUED | OUTPATIENT
Start: 2021-09-21 | End: 2021-09-22

## 2021-09-21 RX ORDER — OXYTOCIN 10 UNIT/ML
333.33 VIAL (ML) INJECTION
Qty: 20 | Refills: 0 | Status: DISCONTINUED | OUTPATIENT
Start: 2021-09-21 | End: 2021-09-22

## 2021-09-21 RX ORDER — SODIUM CHLORIDE 9 MG/ML
3 INJECTION INTRAMUSCULAR; INTRAVENOUS; SUBCUTANEOUS EVERY 8 HOURS
Refills: 0 | Status: DISCONTINUED | OUTPATIENT
Start: 2021-09-21 | End: 2021-09-22

## 2021-09-21 RX ORDER — HYDROCORTISONE 1 %
1 OINTMENT (GRAM) TOPICAL EVERY 6 HOURS
Refills: 0 | Status: DISCONTINUED | OUTPATIENT
Start: 2021-09-21 | End: 2021-09-22

## 2021-09-21 RX ORDER — DIBUCAINE 1 %
1 OINTMENT (GRAM) RECTAL EVERY 6 HOURS
Refills: 0 | Status: DISCONTINUED | OUTPATIENT
Start: 2021-09-21 | End: 2021-09-22

## 2021-09-21 RX ORDER — DIPHENHYDRAMINE HCL 50 MG
25 CAPSULE ORAL EVERY 6 HOURS
Refills: 0 | Status: DISCONTINUED | OUTPATIENT
Start: 2021-09-21 | End: 2021-09-22

## 2021-09-21 RX ORDER — AER TRAVELER 0.5 G/1
1 SOLUTION RECTAL; TOPICAL EVERY 4 HOURS
Refills: 0 | Status: DISCONTINUED | OUTPATIENT
Start: 2021-09-21 | End: 2021-09-22

## 2021-09-21 RX ORDER — LEVOTHYROXINE SODIUM 125 MCG
100 TABLET ORAL DAILY
Refills: 0 | Status: DISCONTINUED | OUTPATIENT
Start: 2021-09-21 | End: 2021-09-22

## 2021-09-21 RX ORDER — TETANUS TOXOID, REDUCED DIPHTHERIA TOXOID AND ACELLULAR PERTUSSIS VACCINE, ADSORBED 5; 2.5; 8; 8; 2.5 [IU]/.5ML; [IU]/.5ML; UG/.5ML; UG/.5ML; UG/.5ML
0.5 SUSPENSION INTRAMUSCULAR ONCE
Refills: 0 | Status: DISCONTINUED | OUTPATIENT
Start: 2021-09-21 | End: 2021-09-22

## 2021-09-21 RX ORDER — OXYCODONE HYDROCHLORIDE 5 MG/1
5 TABLET ORAL EVERY 6 HOURS
Refills: 0 | Status: DISCONTINUED | OUTPATIENT
Start: 2021-09-21 | End: 2021-09-22

## 2021-09-21 RX ORDER — SIMETHICONE 80 MG/1
80 TABLET, CHEWABLE ORAL EVERY 4 HOURS
Refills: 0 | Status: DISCONTINUED | OUTPATIENT
Start: 2021-09-21 | End: 2021-09-22

## 2021-09-21 RX ORDER — ACETAMINOPHEN 500 MG
650 TABLET ORAL EVERY 6 HOURS
Refills: 0 | Status: DISCONTINUED | OUTPATIENT
Start: 2021-09-21 | End: 2021-09-22

## 2021-09-21 RX ORDER — PRAMOXINE HYDROCHLORIDE 150 MG/15G
1 AEROSOL, FOAM RECTAL EVERY 4 HOURS
Refills: 0 | Status: DISCONTINUED | OUTPATIENT
Start: 2021-09-21 | End: 2021-09-22

## 2021-09-21 RX ORDER — BENZOCAINE 10 %
1 GEL (GRAM) MUCOUS MEMBRANE EVERY 6 HOURS
Refills: 0 | Status: DISCONTINUED | OUTPATIENT
Start: 2021-09-21 | End: 2021-09-22

## 2021-09-21 RX ORDER — LANOLIN
1 OINTMENT (GRAM) TOPICAL EVERY 6 HOURS
Refills: 0 | Status: DISCONTINUED | OUTPATIENT
Start: 2021-09-21 | End: 2021-09-22

## 2021-09-21 RX ADMIN — Medication 600 MILLIGRAM(S): at 12:02

## 2021-09-21 RX ADMIN — Medication 600 MILLIGRAM(S): at 04:00

## 2021-09-21 RX ADMIN — Medication 600 MILLIGRAM(S): at 22:30

## 2021-09-21 RX ADMIN — Medication 1 TABLET(S): at 12:00

## 2021-09-21 RX ADMIN — SODIUM CHLORIDE 3 MILLILITER(S): 9 INJECTION INTRAMUSCULAR; INTRAVENOUS; SUBCUTANEOUS at 06:14

## 2021-09-21 RX ADMIN — Medication 100 MICROGRAM(S): at 06:15

## 2021-09-21 RX ADMIN — SODIUM CHLORIDE 3 MILLILITER(S): 9 INJECTION INTRAMUSCULAR; INTRAVENOUS; SUBCUTANEOUS at 23:00

## 2021-09-21 RX ADMIN — Medication 1000 MILLIUNIT(S)/MIN: at 00:46

## 2021-09-21 RX ADMIN — Medication 600 MILLIGRAM(S): at 13:02

## 2021-09-21 RX ADMIN — Medication 600 MILLIGRAM(S): at 03:45

## 2021-09-21 RX ADMIN — Medication 600 MILLIGRAM(S): at 20:55

## 2021-09-21 RX ADMIN — SODIUM CHLORIDE 3 MILLILITER(S): 9 INJECTION INTRAMUSCULAR; INTRAVENOUS; SUBCUTANEOUS at 14:50

## 2021-09-22 VITALS
DIASTOLIC BLOOD PRESSURE: 63 MMHG | SYSTOLIC BLOOD PRESSURE: 105 MMHG | RESPIRATION RATE: 16 BRPM | HEART RATE: 70 BPM | OXYGEN SATURATION: 99 % | TEMPERATURE: 98 F

## 2021-09-22 PROCEDURE — 87635 SARS-COV-2 COVID-19 AMP PRB: CPT

## 2021-09-22 PROCEDURE — 85025 COMPLETE CBC W/AUTO DIFF WBC: CPT

## 2021-09-22 PROCEDURE — 36415 COLL VENOUS BLD VENIPUNCTURE: CPT

## 2021-09-22 PROCEDURE — 85027 COMPLETE CBC AUTOMATED: CPT

## 2021-09-22 PROCEDURE — 85384 FIBRINOGEN ACTIVITY: CPT

## 2021-09-22 PROCEDURE — 85610 PROTHROMBIN TIME: CPT

## 2021-09-22 PROCEDURE — 59025 FETAL NON-STRESS TEST: CPT

## 2021-09-22 PROCEDURE — 86780 TREPONEMA PALLIDUM: CPT

## 2021-09-22 PROCEDURE — 85730 THROMBOPLASTIN TIME PARTIAL: CPT

## 2021-09-22 PROCEDURE — 86850 RBC ANTIBODY SCREEN: CPT

## 2021-09-22 PROCEDURE — 86901 BLOOD TYPING SEROLOGIC RH(D): CPT

## 2021-09-22 PROCEDURE — 86900 BLOOD TYPING SEROLOGIC ABO: CPT

## 2021-09-22 PROCEDURE — G0463: CPT

## 2021-09-22 PROCEDURE — 59050 FETAL MONITOR W/REPORT: CPT

## 2021-09-22 PROCEDURE — 59409 OBSTETRICAL CARE: CPT | Mod: U9

## 2021-09-22 RX ORDER — SENNOSIDES/DOCUSATE SODIUM 8.6MG-50MG
2 TABLET ORAL
Qty: 60 | Refills: 0
Start: 2021-09-22 | End: 2021-10-21

## 2021-09-22 RX ORDER — IBUPROFEN 200 MG
1 TABLET ORAL
Qty: 40 | Refills: 0
Start: 2021-09-22 | End: 2021-10-01

## 2021-09-22 RX ADMIN — Medication 600 MILLIGRAM(S): at 06:09

## 2021-09-22 RX ADMIN — SODIUM CHLORIDE 3 MILLILITER(S): 9 INJECTION INTRAMUSCULAR; INTRAVENOUS; SUBCUTANEOUS at 05:51

## 2021-09-22 RX ADMIN — Medication 100 MICROGRAM(S): at 06:10

## 2021-09-22 RX ADMIN — Medication 600 MILLIGRAM(S): at 07:02

## 2021-09-22 NOTE — LACTATION INITIAL EVALUATION - INTERVENTION OUTCOME
verbalizes understanding/demonstrates understanding of teaching/good return demonstration/needs met
verbalizes understanding/demonstrates understanding of teaching/needs met/discharge criteria met

## 2021-09-22 NOTE — DISCHARGE NOTE OB - PLAN OF CARE
routine vaginal delivery care, no tub baths, douching or sex for 6weeks, ambulate daily   follow up in 5-6wks with own obgyn

## 2021-09-22 NOTE — DISCHARGE NOTE OB - HOSPITAL COURSE
prsentd in early labor  s/p   normal course of labor and delivery and post partum care, pt stable   covid neg

## 2021-09-22 NOTE — DISCHARGE NOTE OB - PATIENT PORTAL LINK FT
You can access the FollowMyHealth Patient Portal offered by [facility] by registering at the following website: [email or web address]. By joining [employer]’s FollowMyHealth portal, you will also be able to view your health information using other applications (apps) compatible with our system.

## 2021-09-22 NOTE — DISCHARGE NOTE OB - CARE PROVIDER_API CALL
Stephanie Dey)  Obstetrics and Gynecology  [address] [place] [address]  Phone: [phone]  Fax: [phone]  Follow Up Time: 1 month

## 2021-09-22 NOTE — PROGRESS NOTE ADULT - SUBJECTIVE AND OBJECTIVE BOX
Patient seen at bedside resting comfortably offers no current complaints.  Ambulating and voiding without difficulty.  Passing flatus and tolerating regular diet. Pt both breast/bottle feeding.  Pt denies HA, CP, SOB, N/V/D, dizziness, palpitations, worsening abdominal pain, worsening vaginal bleeding, or any other concerns.      Vital Signs Last 24 Hrs  T(C): 36.7 (22 Sep 2021 06:18), Max: 36.7 (21 Sep 2021 19:02)  T(F): 98 (22 Sep 2021 06:18), Max: 98 (21 Sep 2021 19:02)  HR: 70 (22 Sep 2021 06:18) (70 - 87)  BP: 105/63 (22 Sep 2021 06:18) (100/59 - 105/63)  BP(mean): --  RR: 16 (22 Sep 2021 06:18) (16 - 16)  SpO2: 99% (22 Sep 2021 06:18) (99% - 100%)    Physical Exam:     Gen: A&Ox 3, NAD  Chest: CTA B/L  Cardiac: S1,S2; RRR  Breast: Soft, nontender, nonengorged  Abdomen: +BS; Soft, nontender, ND; Fundus firm below umbilicus  Gyn: Min lochia  Ext: Nontender, no worsening edema                          8.4    10.68 )-----------( 228      ( 21 Sep 2021 18:21 )             26.4

## 2021-09-22 NOTE — LACTATION INITIAL EVALUATION - LATCH: COMFORT (BREAST/NIPPLE) INFANT
(2) soft/nontender
General Sunscreen Counseling: I recommended a broad spectrum sunscreen with a SPF of 30 or higher. I explained that SPF 30 sunscreens block approximately 97 percent of the sun's harmful rays. Sunscreens should be applied at least 15 minutes prior to expected sun exposure and then every 2 hours after that as long as sun exposure continues. If swimming or exercising sunscreen should be reapplied every 45 minutes to an hour after getting wet or sweating. One ounce, or the equivalent of a shot glass full of sunscreen, is adequate to protect the skin not covered by a bathing suit. I also recommended a lip balm with a sunscreen as well. Sun protective clothing can be used in lieu of sunscreen but must be worn the entire time you are exposed to the sun's rays.
Products Recommended: Sclera quoted $225
Detail Level: Generalized
(2) soft/nontender

## 2021-09-22 NOTE — PROGRESS NOTE ADULT - ASSESSMENT
A/P:  PPD#1 s/p  @ 38w5d , pt stable    - Discharge home with instructions  -Follow up in office in 5-6 weeks for postpartum visit  -Breastfeeding encouraged   -d/w Dr. Rice, house attending

## 2021-09-22 NOTE — DISCHARGE NOTE OB - CARE PLAN
1 Principal Discharge DX:	 (normal spontaneous vaginal delivery)  Assessment and plan of treatment:	routine vaginal delivery care, no tub baths, douching or sex for 6weeks, ambulate daily   follow up in 5-6wks with own obgyn

## 2021-09-22 NOTE — DISCHARGE NOTE OB - MEDICATION SUMMARY - MEDICATIONS TO TAKE
I will START or STAY ON the medications listed below when I get home from the hospital:    ibuprofen 600 mg oral tablet  -- 1 tab(s) by mouth every 6 hours, As Needed -for moderate pain   -- Do not take this drug if you are pregnant.  It is very important that you take or use this exactly as directed.  Do not skip doses or discontinue unless directed by your doctor.  May cause drowsiness or dizziness.  Obtain medical advice before taking any non-prescription drugs as some may affect the action of this medication.  Take with food or milk.    -- Indication: For for pain    Prenatal Multivitamins with Folic Acid 1 mg oral tablet  -- 1 tab(s) by mouth once a day  -- Indication: For Supplement    Cara-Colace 50 mg-8.6 mg oral tablet  -- 2 tab(s) by mouth once a day (at bedtime)   -- Medication should be taken with plenty of water.    -- Indication: For Stool softener

## 2021-09-22 NOTE — LACTATION INITIAL EVALUATION - LACTATION INTERVENTIONS
scheduled for d/c home today with baby; exclusively breastfeeds; provided with mother-baby breastfeeding and d/c education at pt's bedside; pt. verbalized undrestanding of education provided/initiate/review hand expression/initiate/review pumping guidelines and safe milk handling/post discharge community resources provided/reviewed importance of monitoring infant diapers, the breastfeeding log, and minimum output each day/reviewed risks of unnecessary formula supplementation/reviewed benefits and recommendations for rooming in/reviewed feeding on demand/by cue at least 8 times a day/reviewed indications of inadequate milk transfer that would require supplementation
Breastfeeding on cue 8-12X/24 hours with diaper count to assess for adequate intake, safe skin to skin and rooming-in encouraged./initiate/review safe skin-to-skin/initiate/review techniques for position and latch/reviewed benefits and recommendations for rooming in/reviewed feeding on demand/by cue at least 8 times a day

## 2021-09-28 ENCOUNTER — APPOINTMENT (OUTPATIENT)
Dept: OBGYN | Facility: CLINIC | Age: 26
End: 2021-09-28

## 2021-09-28 ENCOUNTER — APPOINTMENT (OUTPATIENT)
Dept: ANTEPARTUM | Facility: CLINIC | Age: 26
End: 2021-09-28

## 2021-10-11 ENCOUNTER — TRANSCRIPTION ENCOUNTER (OUTPATIENT)
Age: 26
End: 2021-10-11

## 2021-10-25 ENCOUNTER — APPOINTMENT (OUTPATIENT)
Dept: DERMATOLOGY | Facility: CLINIC | Age: 26
End: 2021-10-25

## 2021-10-26 ENCOUNTER — APPOINTMENT (OUTPATIENT)
Dept: ENDOCRINOLOGY | Facility: CLINIC | Age: 26
End: 2021-10-26
Payer: MEDICAID

## 2021-10-26 VITALS
HEART RATE: 59 BPM | SYSTOLIC BLOOD PRESSURE: 93 MMHG | BODY MASS INDEX: 28.71 KG/M2 | WEIGHT: 147 LBS | DIASTOLIC BLOOD PRESSURE: 68 MMHG

## 2021-10-26 PROCEDURE — 99213 OFFICE O/P EST LOW 20 MIN: CPT

## 2021-10-27 LAB
T4 FREE SERPL-MCNC: 1.2 NG/DL
TSH SERPL-ACNC: 8.15 UIU/ML

## 2021-10-28 ENCOUNTER — NON-APPOINTMENT (OUTPATIENT)
Age: 26
End: 2021-10-28

## 2021-11-02 ENCOUNTER — APPOINTMENT (OUTPATIENT)
Dept: OBGYN | Facility: CLINIC | Age: 26
End: 2021-11-02
Payer: MEDICAID

## 2021-11-02 ENCOUNTER — OUTPATIENT (OUTPATIENT)
Dept: OUTPATIENT SERVICES | Facility: HOSPITAL | Age: 26
LOS: 1 days | End: 2021-11-02
Payer: MEDICAID

## 2021-11-02 VITALS
HEIGHT: 60 IN | OXYGEN SATURATION: 98 % | WEIGHT: 147 LBS | DIASTOLIC BLOOD PRESSURE: 65 MMHG | HEART RATE: 62 BPM | SYSTOLIC BLOOD PRESSURE: 98 MMHG | BODY MASS INDEX: 28.86 KG/M2 | TEMPERATURE: 97.2 F

## 2021-11-02 DIAGNOSIS — Z87.09 PERSONAL HISTORY OF OTHER DISEASES OF THE RESPIRATORY SYSTEM: ICD-10-CM

## 2021-11-02 DIAGNOSIS — Z34.00 ENCOUNTER FOR SUPERVISION OF NORMAL FIRST PREGNANCY, UNSPECIFIED TRIMESTER: ICD-10-CM

## 2021-11-02 DIAGNOSIS — Z87.898 PERSONAL HISTORY OF OTHER SPECIFIED CONDITIONS: ICD-10-CM

## 2021-11-02 DIAGNOSIS — Z87.2 PERSONAL HISTORY OF DISEASES OF THE SKIN AND SUBCUTANEOUS TISSUE: ICD-10-CM

## 2021-11-02 DIAGNOSIS — N39.0 URINARY TRACT INFECTION, SITE NOT SPECIFIED: ICD-10-CM

## 2021-11-02 DIAGNOSIS — O09.899 SUPERVISION OF OTHER HIGH RISK PREGNANCIES, UNSPECIFIED TRIMESTER: ICD-10-CM

## 2021-11-02 DIAGNOSIS — A49.9 URINARY TRACT INFECTION, SITE NOT SPECIFIED: ICD-10-CM

## 2021-11-02 DIAGNOSIS — O09.92 SUPERVISION OF HIGH RISK PREGNANCY, UNSPECIFIED, SECOND TRIMESTER: ICD-10-CM

## 2021-11-02 DIAGNOSIS — Z86.69 PERSONAL HISTORY OF OTHER DISEASES OF THE NERVOUS SYSTEM AND SENSE ORGANS: ICD-10-CM

## 2021-11-02 PROCEDURE — 85025 COMPLETE CBC W/AUTO DIFF WBC: CPT

## 2021-11-02 PROCEDURE — 36415 COLL VENOUS BLD VENIPUNCTURE: CPT | Mod: NC

## 2021-11-02 PROCEDURE — 36415 COLL VENOUS BLD VENIPUNCTURE: CPT

## 2021-11-02 PROCEDURE — G0463: CPT

## 2021-11-02 PROCEDURE — 81025 URINE PREGNANCY TEST: CPT

## 2021-11-02 PROCEDURE — 99214 OFFICE O/P EST MOD 30 MIN: CPT | Mod: 25

## 2021-11-02 RX ORDER — BETAMETHASONE DIPROPIONATE 0.5 MG/G
0.05 OINTMENT, AUGMENTED TOPICAL
Qty: 1 | Refills: 3 | Status: COMPLETED | COMMUNITY
Start: 2020-12-28 | End: 2021-11-02

## 2021-11-02 RX ORDER — UBIDECARENONE 200 MG
200 CAPSULE ORAL
Qty: 30 | Refills: 5 | Status: COMPLETED | COMMUNITY
Start: 2021-04-06 | End: 2021-11-02

## 2021-11-02 RX ORDER — OMEGA-3/DHA/EPA/FISH OIL 300-1000MG
400 CAPSULE ORAL
Qty: 30 | Refills: 5 | Status: COMPLETED | COMMUNITY
Start: 2021-04-06 | End: 2021-11-02

## 2021-11-02 RX ORDER — RIBOFLAVIN (VITAMIN B2) 400 MG
400 TABLET ORAL
Qty: 30 | Refills: 5 | Status: COMPLETED | COMMUNITY
Start: 2021-04-06 | End: 2021-11-02

## 2021-11-02 RX ORDER — FLUTICASONE PROPIONATE 110 UG/1
110 AEROSOL, METERED RESPIRATORY (INHALATION)
Qty: 1 | Refills: 3 | Status: COMPLETED | COMMUNITY
Start: 2021-08-06 | End: 2021-11-02

## 2021-11-02 RX ORDER — LEVOTHYROXINE SODIUM 0.1 MG/1
100 TABLET ORAL
Qty: 90 | Refills: 1 | Status: COMPLETED | COMMUNITY
Start: 2021-08-31 | End: 2021-11-02

## 2021-11-02 RX ORDER — TRIAMCINOLONE ACETONIDE 1 MG/G
0.1 OINTMENT TOPICAL
Qty: 1 | Refills: 3 | Status: COMPLETED | COMMUNITY
Start: 2021-05-03 | End: 2021-11-02

## 2021-11-02 NOTE — HISTORY OF PRESENT ILLNESS
[Postpartum Follow Up] : postpartum follow up [Last Pap Date: ___] : Last Pap Date: [unfilled] [Delivery Date: ___] : on [unfilled] [] : delivered by vaginal delivery [Female] : Delivery History: baby girl [Wt. ___] : weighing [unfilled] [Breastfeeding] : currently nursing [Discharge HCT: ___] : hematocrit level was [unfilled] [Discharge HGB: ___] : hemoglobin level was [unfilled] [Resumed Menses] : has not resumed her menses [Resumed [place]] : has not resumed intercourse [Intended Contraception] : Intended Contraception: [Back to Normal] : is back to normal in size [None] : no vaginal bleeding [Cervix Sample Taken] : cervical sample not taken for a Pap smear [Awake] : awake [Alert] : alert [Acute Distress] : no acute distress [Labia Majora] : labia major [Labia Minora] : labia minora [Normal] : clitoris [Doing Well] : is doing well [No Sign of Infection] : is showing no signs of infection [FreeTextEntry8] : (+)Breast feeding / Requesting BTL - needs immediate contraception coverage until date of surgery / EPDS~2 [de-identified] : Postpartum exam / Acute anemia / Contraception   [de-identified] : Rx PNV renewed / CBC sent today / Rx Depo with BTL consent obtained today / Hypothyroid managed by Endocrinology [Rhogam] : Rhogam administered [Rubella Vaccine] : Rubella vaccine administered [Pertussis Vaccine] : Pertussis vaccine administered [BTL] : bilateral tubal ligation completed

## 2021-11-02 NOTE — HISTORY OF PRESENT ILLNESS
[Postpartum Follow Up] : postpartum follow up [Last Pap Date: ___] : Last Pap Date: [unfilled] [Delivery Date: ___] : on [unfilled] [] : delivered by vaginal delivery [Female] : Delivery History: baby girl [Wt. ___] : weighing [unfilled] [Breastfeeding] : currently nursing [Discharge HCT: ___] : hematocrit level was [unfilled] [Discharge HGB: ___] : hemoglobin level was [unfilled] [Resumed Menses] : has not resumed her menses [Resumed [place]] : has not resumed intercourse [Intended Contraception] : Intended Contraception: [Back to Normal] : is back to normal in size [None] : no vaginal bleeding [Cervix Sample Taken] : cervical sample not taken for a Pap smear [Awake] : awake [Alert] : alert [Acute Distress] : no acute distress [Labia Majora] : labia major [Labia Minora] : labia minora [Normal] : clitoris [Doing Well] : is doing well [No Sign of Infection] : is showing no signs of infection [FreeTextEntry8] : (+)Breast feeding / Requesting BTL - needs immediate contraception coverage until date of surgery / EPDS~2 [de-identified] : Postpartum exam / Acute anemia / Contraception   [de-identified] : Rx PNV renewed / CBC sent today / Rx Depo with BTL consent obtained today / Hypothyroid managed by Endocrinology [Rhogam] : Rhogam administered [Rubella Vaccine] : Rubella vaccine administered [Pertussis Vaccine] : Pertussis vaccine administered [BTL] : bilateral tubal ligation completed

## 2021-11-03 DIAGNOSIS — D64.9 ANEMIA, UNSPECIFIED: ICD-10-CM

## 2021-11-03 DIAGNOSIS — E03.9 HYPOTHYROIDISM, UNSPECIFIED: ICD-10-CM

## 2021-11-03 DIAGNOSIS — Z78.9 OTHER SPECIFIED HEALTH STATUS: ICD-10-CM

## 2021-11-03 DIAGNOSIS — Z30.09 ENCOUNTER FOR OTHER GENERAL COUNSELING AND ADVICE ON CONTRACEPTION: ICD-10-CM

## 2021-11-03 DIAGNOSIS — Z32.02 ENCOUNTER FOR PREGNANCY TEST, RESULT NEGATIVE: ICD-10-CM

## 2021-11-03 DIAGNOSIS — Z30.013 ENCOUNTER FOR INITIAL PRESCRIPTION OF INJECTABLE CONTRACEPTIVE: ICD-10-CM

## 2021-11-03 LAB
BASOPHILS # BLD AUTO: 0.03 K/UL
BASOPHILS NFR BLD AUTO: 0.4 %
EOSINOPHIL # BLD AUTO: 0.29 K/UL
EOSINOPHIL NFR BLD AUTO: 3.7 %
HCT VFR BLD CALC: 38.5 %
HGB BLD-MCNC: 11.7 G/DL
IMM GRANULOCYTES NFR BLD AUTO: 0.1 %
LYMPHOCYTES # BLD AUTO: 2.77 K/UL
LYMPHOCYTES NFR BLD AUTO: 35.1 %
MAN DIFF?: NORMAL
MCHC RBC-ENTMCNC: 25.6 PG
MCHC RBC-ENTMCNC: 30.4 GM/DL
MCV RBC AUTO: 84.2 FL
MONOCYTES # BLD AUTO: 0.53 K/UL
MONOCYTES NFR BLD AUTO: 6.7 %
NEUTROPHILS # BLD AUTO: 4.26 K/UL
NEUTROPHILS NFR BLD AUTO: 54 %
PLATELET # BLD AUTO: 282 K/UL
RBC # BLD: 4.57 M/UL
RBC # FLD: 15.4 %
WBC # FLD AUTO: 7.89 K/UL

## 2021-12-01 NOTE — PATIENT PROFILE OB - COMPLETE THE FOLLOWING IF THE PATIENT REFUSES THE INFLUENZA VACCINE:
Risks/benefits discussed with patient/surrogate/Vaccine Information Sheet (VIS) provided-VIS date: 8/15/19 Cyclosporine Counseling:  I discussed with the patient the risks of cyclosporine including but not limited to hypertension, gingival hyperplasia,myelosuppression, immunosuppression, liver damage, kidney damage, neurotoxicity, lymphoma, and serious infections. The patient understands that monitoring is required including baseline blood pressure, CBC, CMP, lipid panel and uric acid, and then 1-2 times monthly CMP and blood pressure.

## 2021-12-16 ENCOUNTER — OUTPATIENT (OUTPATIENT)
Dept: OUTPATIENT SERVICES | Facility: HOSPITAL | Age: 26
LOS: 1 days | End: 2021-12-16
Payer: MEDICAID

## 2021-12-16 VITALS
OXYGEN SATURATION: 98 % | HEART RATE: 80 BPM | WEIGHT: 147.93 LBS | HEIGHT: 60 IN | SYSTOLIC BLOOD PRESSURE: 117 MMHG | DIASTOLIC BLOOD PRESSURE: 50 MMHG | TEMPERATURE: 98 F | RESPIRATION RATE: 16 BRPM

## 2021-12-16 DIAGNOSIS — Z30.2 ENCOUNTER FOR STERILIZATION: ICD-10-CM

## 2021-12-16 DIAGNOSIS — Z98.890 OTHER SPECIFIED POSTPROCEDURAL STATES: Chronic | ICD-10-CM

## 2021-12-16 DIAGNOSIS — I82.0 BUDD-CHIARI SYNDROME: ICD-10-CM

## 2021-12-16 DIAGNOSIS — E03.9 HYPOTHYROIDISM, UNSPECIFIED: ICD-10-CM

## 2021-12-16 DIAGNOSIS — G47.33 OBSTRUCTIVE SLEEP APNEA (ADULT) (PEDIATRIC): ICD-10-CM

## 2021-12-16 DIAGNOSIS — Z01.818 ENCOUNTER FOR OTHER PREPROCEDURAL EXAMINATION: ICD-10-CM

## 2021-12-16 LAB
ANION GAP SERPL CALC-SCNC: 6 MMOL/L — SIGNIFICANT CHANGE UP (ref 5–17)
APTT BLD: 35 SEC — SIGNIFICANT CHANGE UP (ref 27.5–35.5)
BLD GP AB SCN SERPL QL: SIGNIFICANT CHANGE UP
BUN SERPL-MCNC: 19 MG/DL — HIGH (ref 7–18)
CALCIUM SERPL-MCNC: 8.8 MG/DL — SIGNIFICANT CHANGE UP (ref 8.4–10.5)
CHLORIDE SERPL-SCNC: 108 MMOL/L — SIGNIFICANT CHANGE UP (ref 96–108)
CO2 SERPL-SCNC: 26 MMOL/L — SIGNIFICANT CHANGE UP (ref 22–31)
CREAT SERPL-MCNC: 0.74 MG/DL — SIGNIFICANT CHANGE UP (ref 0.5–1.3)
GLUCOSE SERPL-MCNC: 99 MG/DL — SIGNIFICANT CHANGE UP (ref 70–99)
HCT VFR BLD CALC: 34.7 % — SIGNIFICANT CHANGE UP (ref 34.5–45)
HGB BLD-MCNC: 11.6 G/DL — SIGNIFICANT CHANGE UP (ref 11.5–15.5)
INR BLD: 1.09 RATIO — SIGNIFICANT CHANGE UP (ref 0.88–1.16)
MCHC RBC-ENTMCNC: 26.5 PG — LOW (ref 27–34)
MCHC RBC-ENTMCNC: 33.4 GM/DL — SIGNIFICANT CHANGE UP (ref 32–36)
MCV RBC AUTO: 79.4 FL — LOW (ref 80–100)
NRBC # BLD: 0 /100 WBCS — SIGNIFICANT CHANGE UP (ref 0–0)
PLATELET # BLD AUTO: 331 K/UL — SIGNIFICANT CHANGE UP (ref 150–400)
POTASSIUM SERPL-MCNC: 3.9 MMOL/L — SIGNIFICANT CHANGE UP (ref 3.5–5.3)
POTASSIUM SERPL-SCNC: 3.9 MMOL/L — SIGNIFICANT CHANGE UP (ref 3.5–5.3)
PROTHROM AB SERPL-ACNC: 12.9 SEC — SIGNIFICANT CHANGE UP (ref 10.6–13.6)
RBC # BLD: 4.37 M/UL — SIGNIFICANT CHANGE UP (ref 3.8–5.2)
RBC # FLD: 14.9 % — HIGH (ref 10.3–14.5)
SODIUM SERPL-SCNC: 140 MMOL/L — SIGNIFICANT CHANGE UP (ref 135–145)
T4 AB SER-ACNC: 15.8 UG/DL — HIGH (ref 4.6–12)
TSH SERPL-MCNC: <0.01 UU/ML — LOW (ref 0.34–4.82)
WBC # BLD: 5.44 K/UL — SIGNIFICANT CHANGE UP (ref 3.8–10.5)
WBC # FLD AUTO: 5.44 K/UL — SIGNIFICANT CHANGE UP (ref 3.8–10.5)

## 2021-12-16 PROCEDURE — G0463: CPT

## 2021-12-16 NOTE — H&P PST ADULT - NSANTHOSAYNRD_GEN_A_CORE
No. ELLIOTT screening performed.  STOP BANG Legend: 0-2 = LOW Risk; 3-4 = INTERMEDIATE Risk; 5-8 = HIGH Risk

## 2021-12-16 NOTE — H&P PST ADULT - NEUROLOGICAL SYMPTOMS
paresthesias BUE - numbness and tingling with exacerbation of Chiari Syndrome/paresthesias BUE - numbness and tingling with exacerbation of Chiari Syndrome (no exacerbation for over 1 year)/paresthesias

## 2021-12-16 NOTE — H&P PST ADULT - PROBLEM SELECTOR PLAN 1
Scheduled for laparoscopic bilateral tubal ligation 01/06/2021  Preoperative instructions discussed and given to patient.   Instructed patient to call [phone] to schedule COVID 19 test 72 hrs prior to surgery.   NPO after midnight the day before surgery.   Instructed to avoid aspirin and over the counter medications including vitamins and herbal medications one week before surgery.   Discussed use of chlorhexidine solution 4% the morning of surgery for pre-procedural skin preparation.   Verbal and written instructions were given to patient.   Patient verbalized understanding and is in agreement with plan of care

## 2021-12-16 NOTE — H&P PST ADULT - PROBLEM SELECTOR PLAN 4
Patient has never been diagnosed with ELLIOTT  No sleep studies done  STOP BANG Score is 1 – at low ELLIOTT risk for surgery  Recommend: perioperative precautions

## 2021-12-16 NOTE — H&P PST ADULT - DOES PATIENT HAVE ADVANCE DIRECTIVE
Schedule Procedure:   Please Schedule 05/07/2021  Procedure: Colonoscopy (96179) with SuPrep and EGD (84155)  Diagnosis: Dysphagia R13.10, History of Colon Polyps Z86.010 and LUQ pain  Is patient:    Diabetic? No   On Coumadin, heparin, lovenox? No   On ASA/NSAIDS? Platelet Modifying (examples - Plavix, aspirin, nsaids, Aggrenox)? No   On Phentermine? No   On Viagra, Sildenafil, Verdenafil, Tadalafil? No  Latex allergy: No  Sleep apnea: No  Location: [facility]  Special Instructions:   MAC Anesthesia      Patient notified of date, time and location. Pt received their prep instructions and suprep prep was sent to pharmacy. Pt is scheduled on 05/07/2021 at 8:00 at [facility]. Pt was informed that they will need a ride home and to arrive 45 minutes  prior to procedure.        Routed to  Gastro Surgery Scheduling Pool [place].    All information obtained for this order was given verbatim by Provider to the MA.    No

## 2021-12-16 NOTE — H&P PST ADULT - PROBLEM SELECTOR PLAN 2
Instructed to take thyroid medication (synthroid) as prescribed with a sip of water the morning of surgery. Follow up with PCP/Specialist post-surgery for management of thyroid disorder

## 2021-12-16 NOTE — H&P PST ADULT - PRIMARY CARE PROVIDER
Dr. Shawn Bailey  Telephone [phone], Neurologist Dr. Baltazar Telephone Dr. Shawn Bailey  Telephone [phone], Neurologist Dr. Baltazar Telephone [phone]

## 2021-12-16 NOTE — H&P PST ADULT - HISTORY OF PRESENT ILLNESS
25 y/o multiparous female (currently breast-feeding) with h/o episodic numbness and tingling of BUE associated with Chiari Malformation, Hypothyroidism (compliant on synthroid as prescribed), Seasonal Allergic Rhinitis presents to [facility] for presurgical evaluation. She is scheduled for laparoscopic bilateral tubal ligation 01/06/2021

## 2021-12-16 NOTE — H&P PST ADULT - ASSESSMENT
This is a 27 y/o multiparous breast-feeding female with h/o episodic numbness and tingling of BUE associated with Chiari Malformation, Hypothyroidism (compliant on synthroid as prescribed), and Seasonal Allergic Rhinitis scheduled for laparoscopic bilateral tubal ligation 01/06/2021. Patient's STOP BANG Score is 1

## 2021-12-16 NOTE — H&P PST ADULT - NSICDXFAMILYHX_GEN_ALL_CORE_FT
FAMILY HISTORY:  Father  Still living? Yes, Estimated age: Age Unknown  FH: hyperlipidemia, Age at diagnosis: Age Unknown  FH: hypertension, Age at diagnosis: Age Unknown    Mother  Still living? Yes, Estimated age: Age Unknown  FH: hyperlipidemia, Age at diagnosis: Age Unknown  FH: hypertension, Age at diagnosis: Age Unknown

## 2021-12-16 NOTE — H&P PST ADULT - NSICDXPASTMEDICALHX_GEN_ALL_CORE_FT
PAST MEDICAL HISTORY:  History of Chiari malformation associated with numbness and tingling BLE  with exacerbation of chiari    Hypothyroidism     Multiparity     Seasonal allergic rhinitis

## 2021-12-16 NOTE — H&P PST ADULT - PROBLEM SELECTOR PLAN 3
Patient received neuro optimization prior to delivery of baby 9/2021  Neuro note requested prior to surgery

## 2021-12-17 ENCOUNTER — APPOINTMENT (OUTPATIENT)
Dept: NEUROLOGY | Facility: CLINIC | Age: 26
End: 2021-12-17

## 2021-12-17 PROBLEM — Z64.1 PROBLEMS RELATED TO MULTIPARITY: Chronic | Status: ACTIVE | Noted: 2021-12-16

## 2021-12-17 PROBLEM — E03.9 HYPOTHYROIDISM, UNSPECIFIED: Chronic | Status: ACTIVE | Noted: 2021-12-16

## 2021-12-17 PROBLEM — J30.2 OTHER SEASONAL ALLERGIC RHINITIS: Chronic | Status: ACTIVE | Noted: 2021-12-16

## 2021-12-17 NOTE — HISTORY OF PRESENT ILLNESS
[FreeTextEntry1] : The patient was initially seen for eye twitching which has resolved, now she is here for new headache in bl frontal and temporal area without photo or phonophobia but with nausea. No aura. The headache improves with Medrol pack but the patient continues to have right side pressure pain when her nose bleed.\par \par No new medications, no OCP. She is currently breastfeeding, baby 1 yr old. \par Patient's headaches are well controlled on magnesium, riboflavin and coenzyme Q.\par \par

## 2021-12-17 NOTE — ASSESSMENT
[FreeTextEntry1] : Right side headache in patient with with nose bleeds with normal MRI brain. Continue weight riboflavin 400mg daily, coenzyme Q 200mg daily and Magnesium 400mg. \par \par Nosebleeds refer to ENT for nose bleeds eval.\par \par No neurological contraindication for epidural.\par  PAIN/NAUSEA/DIARRHEA/TENDERNESS/VOMITING

## 2022-01-01 NOTE — PATIENT PROFILE OB - NS PRO RIGHT TO REFUSE TDAP
Acceptable shape position of pinnae; no pits or tags; external auditory canal size and shape acceptable. Tympanic membranes clear (deferrable). risks/benefits discussed with patient

## 2022-01-03 ENCOUNTER — APPOINTMENT (OUTPATIENT)
Dept: DISASTER EMERGENCY | Facility: CLINIC | Age: 27
End: 2022-01-03

## 2022-01-08 PROBLEM — Z86.69 PERSONAL HISTORY OF OTHER DISEASES OF THE NERVOUS SYSTEM AND SENSE ORGANS: Chronic | Status: ACTIVE | Noted: 2021-12-16

## 2022-01-26 ENCOUNTER — APPOINTMENT (OUTPATIENT)
Dept: ENDOCRINOLOGY | Facility: CLINIC | Age: 27
End: 2022-01-26

## 2022-02-14 ENCOUNTER — APPOINTMENT (OUTPATIENT)
Dept: OBGYN | Facility: CLINIC | Age: 27
End: 2022-02-14
Payer: MEDICAID

## 2022-02-14 ENCOUNTER — OUTPATIENT (OUTPATIENT)
Dept: OUTPATIENT SERVICES | Facility: HOSPITAL | Age: 27
LOS: 1 days | End: 2022-02-14
Payer: MEDICAID

## 2022-02-14 VITALS
BODY MASS INDEX: 30.55 KG/M2 | OXYGEN SATURATION: 98 % | TEMPERATURE: 98.1 F | WEIGHT: 155.6 LBS | HEIGHT: 60 IN | RESPIRATION RATE: 18 BRPM | HEART RATE: 68 BPM | SYSTOLIC BLOOD PRESSURE: 107 MMHG | DIASTOLIC BLOOD PRESSURE: 68 MMHG

## 2022-02-14 DIAGNOSIS — Z98.890 OTHER SPECIFIED POSTPROCEDURAL STATES: Chronic | ICD-10-CM

## 2022-02-14 DIAGNOSIS — Z00.00 ENCOUNTER FOR GENERAL ADULT MEDICAL EXAMINATION WITHOUT ABNORMAL FINDINGS: ICD-10-CM

## 2022-02-14 DIAGNOSIS — Z30.013 ENCOUNTER FOR INITIAL PRESCRIPTION OF INJECTABLE CONTRACEPTIVE: ICD-10-CM

## 2022-02-14 DIAGNOSIS — D64.9 ANEMIA, UNSPECIFIED: ICD-10-CM

## 2022-02-14 PROCEDURE — G0463: CPT

## 2022-02-14 PROCEDURE — 99214 OFFICE O/P EST MOD 30 MIN: CPT | Mod: 25

## 2022-02-14 PROCEDURE — 81025 URINE PREGNANCY TEST: CPT

## 2022-02-14 NOTE — HISTORY OF PRESENT ILLNESS
[LMP unknown] : LMP unknown [N] : Patient reports normal menses [Y] : Positive pregnancy history [Patient reported PAP Smear was normal] : Patient reported PAP Smear was normal [FreeTextEntry1] : Patient was scheduled for BTL but family and herself developed COVID infection on the day of BTL surgery, had to cancel.  Patient is overdue by 2 weeks for her Depo injection which she was taking until BTL was obtained.  Patient's POCT is negative today.  Patient was counseled to avoid unprotected sexual intercourse for one week, repeat Office Pregnancy test in 7 days, if negative the administer Depo.  \par \par Patient would like to reschedule her BTL, tasked RN today to book surgery for next available surgical day and notify pt.   [PapSmeardate] : 8/2/19 [PGxTotal] : 6 [PGHxPremature] : 0 [[facility] DispensarylTerm] : 3 [PGHxAbortions] : 3 [[facility]] : 3 [PGHxABInduced] : 3 [PGHxABSpont] : 0 [PGHxMultBirths] : 0 [PGHxEctopic] : 0

## 2022-02-15 DIAGNOSIS — E03.9 HYPOTHYROIDISM, UNSPECIFIED: ICD-10-CM

## 2022-02-15 DIAGNOSIS — Z78.9 OTHER SPECIFIED HEALTH STATUS: ICD-10-CM

## 2022-02-15 DIAGNOSIS — Z32.02 ENCOUNTER FOR PREGNANCY TEST, RESULT NEGATIVE: ICD-10-CM

## 2022-02-15 DIAGNOSIS — Z30.09 ENCOUNTER FOR OTHER GENERAL COUNSELING AND ADVICE ON CONTRACEPTION: ICD-10-CM

## 2022-02-16 ENCOUNTER — APPOINTMENT (OUTPATIENT)
Dept: DERMATOLOGY | Facility: CLINIC | Age: 27
End: 2022-02-16
Payer: MEDICAID

## 2022-02-16 DIAGNOSIS — B37.2 CANDIDIASIS OF SKIN AND NAIL: ICD-10-CM

## 2022-02-16 PROCEDURE — 99214 OFFICE O/P EST MOD 30 MIN: CPT

## 2022-02-23 ENCOUNTER — APPOINTMENT (OUTPATIENT)
Dept: OBGYN | Facility: CLINIC | Age: 27
End: 2022-02-23
Payer: MEDICAID

## 2022-02-23 ENCOUNTER — OUTPATIENT (OUTPATIENT)
Dept: OUTPATIENT SERVICES | Facility: HOSPITAL | Age: 27
LOS: 1 days | End: 2022-02-23
Payer: MEDICAID

## 2022-02-23 VITALS
TEMPERATURE: 96.6 F | SYSTOLIC BLOOD PRESSURE: 101 MMHG | BODY MASS INDEX: 30.43 KG/M2 | OXYGEN SATURATION: 98 % | HEART RATE: 74 BPM | HEIGHT: 60 IN | WEIGHT: 155 LBS | DIASTOLIC BLOOD PRESSURE: 61 MMHG

## 2022-02-23 DIAGNOSIS — Z00.00 ENCOUNTER FOR GENERAL ADULT MEDICAL EXAMINATION WITHOUT ABNORMAL FINDINGS: ICD-10-CM

## 2022-02-23 DIAGNOSIS — Z98.890 OTHER SPECIFIED POSTPROCEDURAL STATES: Chronic | ICD-10-CM

## 2022-02-23 PROCEDURE — ZZZZZ: CPT

## 2022-02-23 PROCEDURE — G0463: CPT

## 2022-02-23 PROCEDURE — 96372 THER/PROPH/DIAG INJ SC/IM: CPT

## 2022-02-23 PROCEDURE — 81025 URINE PREGNANCY TEST: CPT

## 2022-02-24 DIAGNOSIS — Z30.42 ENCOUNTER FOR SURVEILLANCE OF INJECTABLE CONTRACEPTIVE: ICD-10-CM

## 2022-03-18 ENCOUNTER — APPOINTMENT (OUTPATIENT)
Dept: NEUROLOGY | Facility: CLINIC | Age: 27
End: 2022-03-18

## 2022-03-29 ENCOUNTER — APPOINTMENT (OUTPATIENT)
Dept: ENDOCRINOLOGY | Facility: CLINIC | Age: 27
End: 2022-03-29
Payer: MEDICAID

## 2022-03-29 VITALS
HEART RATE: 71 BPM | BODY MASS INDEX: 29.69 KG/M2 | SYSTOLIC BLOOD PRESSURE: 111 MMHG | DIASTOLIC BLOOD PRESSURE: 72 MMHG | WEIGHT: 152 LBS

## 2022-03-29 PROCEDURE — 99213 OFFICE O/P EST LOW 20 MIN: CPT

## 2022-04-08 NOTE — H&P PST ADULT - CENTRAL VENOUS CATHETER
PF DAILY TREATMENT NOTE 10-18    Patient Name: Jennie King  Date:2022  : 1996  [x]  Patient  Verified  Payor:  / Plan: [facility] / Product Type:  /    In time:2:00 PM  Out time:2:44 PM  Total Treatment Time (min): 44  Visit #: 7 of 12    Medicare/BCBS Only   Total Timed Codes (min):  44 1:1 Treatment Time:       Treatment Area: [] Pelvic Floor     [] Other:    SUBJECTIVE  Pain Level (0-10 scale): 0  Any medication changes, allergies to medications, adverse drug reactions, diagnosis change, or new procedure performed?: [x] No    [] Yes (see summary sheet for update)  Subjective functional status/changes:   [] No changes reported  Patient reports no new complaints. OBJECTIVE    10 min Therapeutic Exercise:  [x] See flow sheet :   []  Pelvic floor strengthening                 []  Pelvic floor downtraining  []  Quality pelvic floor contractions       []  Relaxation techniques  []  Urge suppression exercises  []  Other:  Rationale: increase ROM, increase strength and improve coordination  to improve the patients ability to increase ease with ADLs       10 min Therapeutic Activity:  [x]  See flow sheet :  -educated patient on the \"Knack\"    []  Increase Tissue extensibility        []  Assess fiber intake    []  Assess voiding habits  []  Assess bowel habits  []  Other:   Rationale: increase strength and improve coordination  to improve the patients ability to improve ADL ease      12 min Neuromuscular Re-education:  [x]  See flow sheet :   [x]  Pelvic floor strengthening                 [x]  Pelvic floor downtraining  [x]  Quality pelvic floor contractions       [x]  Relaxation techniques  []  Urge suppression exercises  []  Other:  Rationale: increase strength, improve coordination, improve balance and increase proprioception  to improve the patients ability to improve pelvic floor awareness and coordination      12 min Manual Therapy:     In right s/l: STM/DTM and the stick to left glutes and around SIJ   Rationale: decrease pain, increase ROM and increase tissue extensibility to ease ADL tolerance              With   [] TE   [] TA   [] neuro  [] manual   [] other: Patient Education: [x] Review HEP    [] Progressed/Changed HEP based on:   [] positioning   [] body mechanics   [] transfers   [] heat/ice application    [] other:      Other Objective/Functional Measures:       Pain Level (0-10 scale) post treatment: 0    ASSESSMENT/Changes in Function:   Educated patient on the \"Knack\". Patient with good tolerance to therex and stretches today void of adverse effect. Continue to focus on preparing for labor and delivery and good body mechanics with breathe education with childcare activities. Patient will continue to benefit from skilled PT services to modify and progress therapeutic interventions, address functional mobility deficits, address ROM deficits, address strength deficits, analyze and address soft tissue restrictions, analyze and cue movement patterns, analyze and modify body mechanics/ergonomics, assess and modify postural abnormalities and instruct in home and community integration to attain remaining goals. []  See Plan of Care  []  See progress note/recertification  []  See Discharge Summary         Progress towards goals / Updated goals:  Short term goals: To be achieved in 4 weeks:  Patient will demonstrate proper dietary/fluid habits and urge suppression strategies that promote bladder and bowel health to aid in management of urinary urgency and incontinence. Status at eval: Patient consuming sufficient water but some irritants and unaware of bladder fitness, dietary irritants and urge suppression strategies.    3/4/22:  MET, patient reports sufficient water intake      Patient will report improvement in UI complaints evidence by a decrease in pad usage and/or amount of leakage by 50% to improve QOL.   Status at eval: Patient using 2-3 pads (incontinence) per day, generally drop or two but occasionally wet  (amount of leakage).    3/18/22: PROGRESSING, patient reports 1-2 pantiliners per day  3/25/22 Progressing Patient reports no major leakage this week and is no longer using pads   3/30/2022 Patient reports being about 80% better for UI complaints. She reported one episode of UI after sneezing yesterday  4/8/22: nearly met, patient reports minimal leakage       Patient will be able to maintain pelvic floor contraction for 5 seconds, 5 repetitions with no accessory substitution or breath holding. Status at eval:Deferred to next appointment  Current:  External (at ischiorectal fossa) 10 seconds for 5 contractions with gluteal substitution  2/25/2022  Progressing   3/18/22: not assessed at this PN     s: To be achieved in 5 weeks:  Patient will report bladder continence 75% of the time with cough/sneeze/laugh and walking to the toilet.   Status at eval: patient stress and urgency incontinence 3 times  per day  3/18/22: MET, patient reports 75-80% continence     Patient will demonstrate the ability to maintain a pelvic floor contraction  for 10 seconds, 10 repetitions. Tested externally at the levator ani  Status at eval: deferred to the next appointment  Current:  External (at ischiorectal fossa) 10 seconds for 5 contractions with gluteal substitution  2/25/2022  Progressing   3/18/22: not assessed at this PN     Patient will demonstrate improvement of current complaints evidenced by a 13 point  improvement in FOTO, Pelvic functional disability index score  Status at Eval: Pelvic functional disability index 51  3/18/22: will assess at next PN, today is patient's fourth visit     Patient will demonstrate independence with management tools and exercise program that are beneficial for current condition in order to feel comfortable with Pelvic floor PT D/C and not fear exacerbation of current condition or symptoms returning.    Status at eval : patient  unaware of what activities to avoid to avoid exacerbation of current condition  3/18/22: PROGRESSING, patient is being compliant with HEPs       PLAN  []  Upgrade activities as tolerated     [x]  Continue plan of care  []  Update interventions per flow sheet       []  Discharge due to:_  []  Other:_      Domingo Patiño 4/8/2022  10:04 AM    Future Appointments   Date Time Provider Wilton Agarwal   4/8/2022  2:00 PM [facility]   4/13/2022  4:15 PM Luis Felipe Hendrix, [address] [facility]   4/20/2022  3:30 PM [facility]   4/27/2022  4:15 PM Luis Felipe Hendrix, [address] [facility] no

## 2022-05-02 ENCOUNTER — APPOINTMENT (OUTPATIENT)
Dept: OBGYN | Facility: CLINIC | Age: 27
End: 2022-05-02

## 2022-05-09 ENCOUNTER — OUTPATIENT (OUTPATIENT)
Dept: OUTPATIENT SERVICES | Facility: HOSPITAL | Age: 27
LOS: 1 days | End: 2022-05-09
Payer: MEDICAID

## 2022-05-09 VITALS
RESPIRATION RATE: 16 BRPM | WEIGHT: 156.97 LBS | TEMPERATURE: 98 F | DIASTOLIC BLOOD PRESSURE: 68 MMHG | HEART RATE: 70 BPM | SYSTOLIC BLOOD PRESSURE: 102 MMHG | HEIGHT: 60 IN | OXYGEN SATURATION: 100 %

## 2022-05-09 DIAGNOSIS — Z01.818 ENCOUNTER FOR OTHER PREPROCEDURAL EXAMINATION: ICD-10-CM

## 2022-05-09 DIAGNOSIS — E03.9 HYPOTHYROIDISM, UNSPECIFIED: ICD-10-CM

## 2022-05-09 DIAGNOSIS — Z30.2 ENCOUNTER FOR STERILIZATION: ICD-10-CM

## 2022-05-09 DIAGNOSIS — Z98.890 OTHER SPECIFIED POSTPROCEDURAL STATES: Chronic | ICD-10-CM

## 2022-05-09 LAB
ANION GAP SERPL CALC-SCNC: 3 MMOL/L — LOW (ref 5–17)
APTT BLD: 35.1 SEC — SIGNIFICANT CHANGE UP (ref 27.5–35.5)
BLD GP AB SCN SERPL QL: SIGNIFICANT CHANGE UP
BUN SERPL-MCNC: 16 MG/DL — SIGNIFICANT CHANGE UP (ref 7–18)
CALCIUM SERPL-MCNC: 9.2 MG/DL — SIGNIFICANT CHANGE UP (ref 8.4–10.5)
CHLORIDE SERPL-SCNC: 109 MMOL/L — HIGH (ref 96–108)
CO2 SERPL-SCNC: 28 MMOL/L — SIGNIFICANT CHANGE UP (ref 22–31)
CREAT SERPL-MCNC: 0.72 MG/DL — SIGNIFICANT CHANGE UP (ref 0.5–1.3)
EGFR: 117 ML/MIN/1.73M2 — SIGNIFICANT CHANGE UP
GLUCOSE SERPL-MCNC: 84 MG/DL — SIGNIFICANT CHANGE UP (ref 70–99)
HCG SERPL-ACNC: <1 MIU/ML — SIGNIFICANT CHANGE UP
HCT VFR BLD CALC: 36.9 % — SIGNIFICANT CHANGE UP (ref 34.5–45)
HGB BLD-MCNC: 12.3 G/DL — SIGNIFICANT CHANGE UP (ref 11.5–15.5)
INR BLD: 1.07 RATIO — SIGNIFICANT CHANGE UP (ref 0.88–1.16)
MCHC RBC-ENTMCNC: 28.6 PG — SIGNIFICANT CHANGE UP (ref 27–34)
MCHC RBC-ENTMCNC: 33.3 GM/DL — SIGNIFICANT CHANGE UP (ref 32–36)
MCV RBC AUTO: 85.8 FL — SIGNIFICANT CHANGE UP (ref 80–100)
NRBC # BLD: 0 /100 WBCS — SIGNIFICANT CHANGE UP (ref 0–0)
PLATELET # BLD AUTO: 316 K/UL — SIGNIFICANT CHANGE UP (ref 150–400)
POTASSIUM SERPL-MCNC: 4.5 MMOL/L — SIGNIFICANT CHANGE UP (ref 3.5–5.3)
POTASSIUM SERPL-SCNC: 4.5 MMOL/L — SIGNIFICANT CHANGE UP (ref 3.5–5.3)
PROTHROM AB SERPL-ACNC: 12.8 SEC — SIGNIFICANT CHANGE UP (ref 10.5–13.4)
RBC # BLD: 4.3 M/UL — SIGNIFICANT CHANGE UP (ref 3.8–5.2)
RBC # FLD: 12.4 % — SIGNIFICANT CHANGE UP (ref 10.3–14.5)
SODIUM SERPL-SCNC: 140 MMOL/L — SIGNIFICANT CHANGE UP (ref 135–145)
TSH SERPL-MCNC: 2.98 UU/ML — SIGNIFICANT CHANGE UP (ref 0.34–4.82)
WBC # BLD: 8.29 K/UL — SIGNIFICANT CHANGE UP (ref 3.8–10.5)
WBC # FLD AUTO: 8.29 K/UL — SIGNIFICANT CHANGE UP (ref 3.8–10.5)

## 2022-05-09 RX ORDER — SODIUM CHLORIDE 9 MG/ML
3 INJECTION INTRAMUSCULAR; INTRAVENOUS; SUBCUTANEOUS EVERY 8 HOURS
Refills: 0 | Status: DISCONTINUED | OUTPATIENT
Start: 2022-05-12 | End: 2022-05-19

## 2022-05-09 NOTE — H&P PST ADULT - NS MD HP INPLANTS MED DEV
You can access the FollowMyHealth Patient Portal offered by [facility] by registering at the following website: [email or web address]. By joining [facility]’s FollowMyHealth portal, you will also be able to view your health information using other applications (apps) compatible with our system.
None

## 2022-05-09 NOTE — H&P PST ADULT - HISTORY OF PRESENT ILLNESS
25 y/o multiparous, breast feeding female, 7 months post partum  with PMH of seasonal allergic rhinitis and hypothyroidism (on medication) is scheduled laparoscopic bilateral tubal ligation 5/12/22

## 2022-05-09 NOTE — H&P PST ADULT - ASSESSMENT
27 y/o multiparous, breast feeding female, 7 months post partum  with PMH of seasonal allergic rhinitis and hypothyroidism (on medication) is scheduled laparoscopic bilateral tubal ligation 5/12/22  STOP BANG Score is 0

## 2022-05-09 NOTE — H&P PST ADULT - PROBLEM SELECTOR PLAN 2
Take synthroid as prescribed with a sip of water the morning of surgery  Follow up with PCP post surgery for management of thyroid disease  TSH level here today

## 2022-05-09 NOTE — H&P PST ADULT - PROBLEM SELECTOR PLAN 1
Scheduled for laparoscopic bilateral tubal ligation 5/12/22  Preoperative instructions discussed and given to patient  NPO after midnight the day before surgery  Take recommended medications as discussed with a sip of water the morning of surgery  Avoid NSAIDs, vitamins, herbal medications one week before surgery  Take Tylenol as needed for pain  Use Marycruz Hex 4% as discussed - 3days prior to surgery  Call [phone] to scheduled covid 19 test 3 days before surgery  Patient verbalized understanding of instructions Scheduled for laparoscopic bilateral tubal ligation 5/12/22  Preoperative instructions discussed and given to patient  NPO after midnight the day before surgery  Take recommended medications as discussed with a sip of water the morning of surgery  Avoid NSAIDs, vitamins, herbal medications one week before surgery  Take Tylenol as needed for pain  Use Marycruz Hex 4% as discussed - 3days prior to surgery  Call [phone] to scheduled covid 19 test 3 days before surgery  Patient verbalized understanding of instructions  Labs/ekg here today - to be faxed to PCP for Medical Optimization

## 2022-05-09 NOTE — H&P PST ADULT - VENOUS THROMBOEMBOLISM AGE
1. Please make sure you get CT scan late July around 7/28/21 before next follow up visit. 2. For your nails: I am referring you to dermatology. I am also calling in a topic cream to apply to nail beds.
(0) age 40 years or less

## 2022-05-10 ENCOUNTER — LABORATORY RESULT (OUTPATIENT)
Age: 27
End: 2022-05-10

## 2022-05-10 PROCEDURE — U0003: CPT

## 2022-05-10 PROCEDURE — G0463: CPT

## 2022-05-10 PROCEDURE — 93005 ELECTROCARDIOGRAM TRACING: CPT

## 2022-05-10 PROCEDURE — U0005: CPT

## 2022-05-11 ENCOUNTER — TRANSCRIPTION ENCOUNTER (OUTPATIENT)
Age: 27
End: 2022-05-11

## 2022-05-12 ENCOUNTER — TRANSCRIPTION ENCOUNTER (OUTPATIENT)
Age: 27
End: 2022-05-12

## 2022-05-12 ENCOUNTER — OUTPATIENT (OUTPATIENT)
Dept: OUTPATIENT SERVICES | Facility: HOSPITAL | Age: 27
LOS: 1 days | End: 2022-05-12
Payer: MEDICAID

## 2022-05-12 VITALS
TEMPERATURE: 100 F | SYSTOLIC BLOOD PRESSURE: 95 MMHG | OXYGEN SATURATION: 100 % | RESPIRATION RATE: 16 BRPM | DIASTOLIC BLOOD PRESSURE: 65 MMHG | WEIGHT: 156.97 LBS | HEIGHT: 60 IN | HEART RATE: 82 BPM

## 2022-05-12 VITALS
DIASTOLIC BLOOD PRESSURE: 57 MMHG | SYSTOLIC BLOOD PRESSURE: 100 MMHG | HEART RATE: 64 BPM | TEMPERATURE: 99 F | OXYGEN SATURATION: 100 % | RESPIRATION RATE: 16 BRPM

## 2022-05-12 DIAGNOSIS — Z98.890 OTHER SPECIFIED POSTPROCEDURAL STATES: Chronic | ICD-10-CM

## 2022-05-12 DIAGNOSIS — Z30.2 ENCOUNTER FOR STERILIZATION: ICD-10-CM

## 2022-05-12 LAB
BLD GP AB SCN SERPL QL: SIGNIFICANT CHANGE UP
HCG UR QL: NEGATIVE — SIGNIFICANT CHANGE UP

## 2022-05-12 PROCEDURE — 86901 BLOOD TYPING SEROLOGIC RH(D): CPT

## 2022-05-12 PROCEDURE — 58670 LAPAROSCOPY TUBAL CAUTERY: CPT

## 2022-05-12 PROCEDURE — 36415 COLL VENOUS BLD VENIPUNCTURE: CPT

## 2022-05-12 PROCEDURE — 58661 LAPAROSCOPY REMOVE ADNEXA: CPT | Mod: AS

## 2022-05-12 PROCEDURE — 58661 LAPAROSCOPY REMOVE ADNEXA: CPT

## 2022-05-12 PROCEDURE — 86850 RBC ANTIBODY SCREEN: CPT

## 2022-05-12 PROCEDURE — 86900 BLOOD TYPING SEROLOGIC ABO: CPT

## 2022-05-12 PROCEDURE — 81025 URINE PREGNANCY TEST: CPT

## 2022-05-12 RX ORDER — ONDANSETRON 8 MG/1
4 TABLET, FILM COATED ORAL ONCE
Refills: 0 | Status: DISCONTINUED | OUTPATIENT
Start: 2022-05-12 | End: 2022-05-13

## 2022-05-12 RX ORDER — MEDROXYPROGESTERONE ACETATE 150 MG/ML
1 INJECTION, SUSPENSION, EXTENDED RELEASE INTRAMUSCULAR
Qty: 0 | Refills: 0 | DISCHARGE

## 2022-05-12 RX ORDER — ACETAMINOPHEN 500 MG
1000 TABLET ORAL ONCE
Refills: 0 | Status: DISCONTINUED | OUTPATIENT
Start: 2022-05-12 | End: 2022-05-19

## 2022-05-12 RX ORDER — IBUPROFEN 200 MG
1 TABLET ORAL
Qty: 120 | Refills: 0
Start: 2022-05-12 | End: 2022-06-10

## 2022-05-12 RX ORDER — OXYCODONE HYDROCHLORIDE 5 MG/1
5 TABLET ORAL ONCE
Refills: 0 | Status: DISCONTINUED | OUTPATIENT
Start: 2022-05-12 | End: 2022-05-19

## 2022-05-12 RX ORDER — SIMETHICONE 80 MG/1
80 TABLET, CHEWABLE ORAL ONCE
Refills: 0 | Status: COMPLETED | OUTPATIENT
Start: 2022-05-12 | End: 2022-05-12

## 2022-05-12 RX ORDER — FEXOFENADINE HCL 30 MG
1 TABLET ORAL
Qty: 0 | Refills: 0 | DISCHARGE

## 2022-05-12 RX ORDER — LEVOTHYROXINE SODIUM 125 MCG
1 TABLET ORAL
Qty: 0 | Refills: 0 | DISCHARGE

## 2022-05-12 RX ORDER — METOCLOPRAMIDE HCL 10 MG
10 TABLET ORAL ONCE
Refills: 0 | Status: DISCONTINUED | OUTPATIENT
Start: 2022-05-12 | End: 2022-05-19

## 2022-05-12 RX ORDER — FENTANYL CITRATE 50 UG/ML
25 INJECTION INTRAVENOUS
Refills: 0 | Status: DISCONTINUED | OUTPATIENT
Start: 2022-05-12 | End: 2022-05-13

## 2022-05-12 RX ORDER — ACETAMINOPHEN 500 MG
1000 TABLET ORAL ONCE
Refills: 0 | Status: COMPLETED | OUTPATIENT
Start: 2022-05-12 | End: 2022-05-12

## 2022-05-12 RX ORDER — HYDROMORPHONE HYDROCHLORIDE 2 MG/ML
0.5 INJECTION INTRAMUSCULAR; INTRAVENOUS; SUBCUTANEOUS
Refills: 0 | Status: DISCONTINUED | OUTPATIENT
Start: 2022-05-12 | End: 2022-05-13

## 2022-05-12 RX ORDER — SIMETHICONE 80 MG/1
1 TABLET, CHEWABLE ORAL
Qty: 10 | Refills: 0
Start: 2022-05-12 | End: 2022-05-16

## 2022-05-12 RX ORDER — KETOROLAC TROMETHAMINE 30 MG/ML
30 SYRINGE (ML) INJECTION ONCE
Refills: 0 | Status: DISCONTINUED | OUTPATIENT
Start: 2022-05-12 | End: 2022-05-19

## 2022-05-12 RX ADMIN — Medication 400 MILLIGRAM(S): at 17:33

## 2022-05-12 RX ADMIN — SIMETHICONE 80 MILLIGRAM(S): 80 TABLET, CHEWABLE ORAL at 17:44

## 2022-05-12 NOTE — ASU DISCHARGE PLAN (ADULT/PEDIATRIC) - ACTIVITY LEVEL
next6 weeks/No heavy lifting/No sports/gym/No weight bearing/Nothing per vagina/No tub baths/No douching/No tampons/No intercourse

## 2022-05-12 NOTE — ASU DISCHARGE PLAN (ADULT/PEDIATRIC) - CARE PROVIDER_API CALL
Donn Harvey)  Obstetrics and Gynecology  [id number] [place] [address]  Phone: [phone]  Fax: [phone]  Follow Up Time:

## 2022-05-12 NOTE — ASU DISCHARGE PLAN (ADULT/PEDIATRIC) - NS MD DC FALL RISK RISK
For information on Fall & Injury Prevention, visit: [email or web address] OR  [email or web address] OR  https://www.cdc.gov/steadi/patient.html

## 2022-05-12 NOTE — ASU PATIENT PROFILE, ADULT - FALL HARM RISK - UNIVERSAL INTERVENTIONS
Bed in lowest position, wheels locked, appropriate side rails in place/Call bell, personal items and telephone in reach/Instruct patient to call for assistance before getting out of bed or chair/Non-slip footwear when patient is out of bed/[place] to call system/Physically safe environment - no spills, clutter or unnecessary equipment/Purposeful Proactive Rounding/Room/bathroom lighting operational, light cord in reach

## 2022-05-12 NOTE — BRIEF OPERATIVE NOTE - OPERATION/FINDINGS
patient counseled preop regarding nature r/b of laparoscopic bilateral salpingectomy vs. tubal ligation. pt opted for bilateral tubal ligation.   normal appearing cervix, uterus, bilateral fallopian tubes and ovaries

## 2022-05-17 ENCOUNTER — APPOINTMENT (OUTPATIENT)
Dept: ENDOCRINOLOGY | Facility: CLINIC | Age: 27
End: 2022-05-17

## 2022-05-25 ENCOUNTER — APPOINTMENT (OUTPATIENT)
Dept: OBGYN | Facility: CLINIC | Age: 27
End: 2022-05-25
Payer: MEDICAID

## 2022-05-25 ENCOUNTER — OUTPATIENT (OUTPATIENT)
Dept: OUTPATIENT SERVICES | Facility: HOSPITAL | Age: 27
LOS: 1 days | End: 2022-05-25
Payer: MEDICAID

## 2022-05-25 VITALS
BODY MASS INDEX: 31.61 KG/M2 | OXYGEN SATURATION: 99 % | HEART RATE: 73 BPM | SYSTOLIC BLOOD PRESSURE: 103 MMHG | DIASTOLIC BLOOD PRESSURE: 65 MMHG | WEIGHT: 161 LBS | TEMPERATURE: 97.3 F | HEIGHT: 60 IN

## 2022-05-25 DIAGNOSIS — L43.9 LICHEN PLANUS, UNSPECIFIED: ICD-10-CM

## 2022-05-25 DIAGNOSIS — Z98.890 OTHER SPECIFIED POSTPROCEDURAL STATES: Chronic | ICD-10-CM

## 2022-05-25 DIAGNOSIS — Z32.02 ENCOUNTER FOR PREGNANCY TEST, RESULT NEGATIVE: ICD-10-CM

## 2022-05-25 DIAGNOSIS — Z00.00 ENCOUNTER FOR GENERAL ADULT MEDICAL EXAMINATION WITHOUT ABNORMAL FINDINGS: ICD-10-CM

## 2022-05-25 DIAGNOSIS — Z87.898 PERSONAL HISTORY OF OTHER SPECIFIED CONDITIONS: ICD-10-CM

## 2022-05-25 DIAGNOSIS — Z30.09 ENCOUNTER FOR OTHER GENERAL COUNSELING AND ADVICE ON CONTRACEPTION: ICD-10-CM

## 2022-05-25 PROCEDURE — 99214 OFFICE O/P EST MOD 30 MIN: CPT

## 2022-05-25 PROCEDURE — G0463: CPT

## 2022-05-25 NOTE — HISTORY OF PRESENT ILLNESS
[Pain is well-controlled] : pain is well-controlled [Fever] : no fever [Chills] : no chills [Nausea] : no nausea [Diarrhea] : no diarrhea [Vaginal Bleeding] : no vaginal bleeding [Pelvic Pressure] : no pelvic pressure [Dysuria] : no dysuria [Vaginal Discharge] : no vaginal discharge [Constipation] : no constipation [Clean/Dry/Intact] : clean, dry and intact [Mild] : mild vaginal bleeding [de-identified] : Sutures removed atraumatically, hemostasis noted

## 2022-05-25 NOTE — REASON FOR VISIT
[Procedure: ___] : Procedure: [unfilled] [Post Op Day: ___] : Post-Op Day:  #[unfilled] [Indication: ___] : Indication: [unfilled]

## 2022-05-25 NOTE — PLAN
[Sutures Removed] : sutures were removed [None] : None [de-identified] : RTO 3 months for annual Gyn exam Karen Kirkland)  2019 07:57:49 Callie Quiroz)  2019 08:29:05

## 2022-06-07 DIAGNOSIS — E03.9 HYPOTHYROIDISM, UNSPECIFIED: ICD-10-CM

## 2022-06-07 DIAGNOSIS — L43.9 LICHEN PLANUS, UNSPECIFIED: ICD-10-CM

## 2022-06-07 DIAGNOSIS — Z48.89 ENCOUNTER FOR OTHER SPECIFIED SURGICAL AFTERCARE: ICD-10-CM

## 2022-06-07 DIAGNOSIS — Z78.9 OTHER SPECIFIED HEALTH STATUS: ICD-10-CM

## 2022-06-07 DIAGNOSIS — Z98.51 TUBAL LIGATION STATUS: ICD-10-CM

## 2022-07-19 ENCOUNTER — OUTPATIENT (OUTPATIENT)
Dept: OUTPATIENT SERVICES | Facility: HOSPITAL | Age: 27
LOS: 1 days | End: 2022-07-19
Payer: MEDICAID

## 2022-07-19 ENCOUNTER — APPOINTMENT (OUTPATIENT)
Dept: OBGYN | Facility: CLINIC | Age: 27
End: 2022-07-19

## 2022-07-19 VITALS
WEIGHT: 158 LBS | BODY MASS INDEX: 31.02 KG/M2 | SYSTOLIC BLOOD PRESSURE: 103 MMHG | OXYGEN SATURATION: 98 % | HEART RATE: 68 BPM | HEIGHT: 60 IN | DIASTOLIC BLOOD PRESSURE: 68 MMHG | TEMPERATURE: 98.1 F

## 2022-07-19 DIAGNOSIS — Z98.890 OTHER SPECIFIED POSTPROCEDURAL STATES: Chronic | ICD-10-CM

## 2022-07-19 DIAGNOSIS — Z48.89 ENCOUNTER FOR OTHER SPECIFIED SURGICAL AFTERCARE: ICD-10-CM

## 2022-07-19 DIAGNOSIS — Z00.00 ENCOUNTER FOR GENERAL ADULT MEDICAL EXAMINATION WITHOUT ABNORMAL FINDINGS: ICD-10-CM

## 2022-07-19 PROCEDURE — 85025 COMPLETE CBC W/AUTO DIFF WBC: CPT

## 2022-07-19 PROCEDURE — 84439 ASSAY OF FREE THYROXINE: CPT

## 2022-07-19 PROCEDURE — 87186 SC STD MICRODIL/AGAR DIL: CPT

## 2022-07-19 PROCEDURE — 84702 CHORIONIC GONADOTROPIN TEST: CPT

## 2022-07-19 PROCEDURE — 80053 COMPREHEN METABOLIC PANEL: CPT

## 2022-07-19 PROCEDURE — 83001 ASSAY OF GONADOTROPIN (FSH): CPT

## 2022-07-19 PROCEDURE — 84146 ASSAY OF PROLACTIN: CPT

## 2022-07-19 PROCEDURE — 84443 ASSAY THYROID STIM HORMONE: CPT

## 2022-07-19 PROCEDURE — 87086 URINE CULTURE/COLONY COUNT: CPT

## 2022-07-19 PROCEDURE — G0463: CPT

## 2022-07-19 PROCEDURE — 99214 OFFICE O/P EST MOD 30 MIN: CPT

## 2022-07-19 PROCEDURE — 36415 COLL VENOUS BLD VENIPUNCTURE: CPT

## 2022-07-19 RX ORDER — MEDROXYPROGESTERONE ACETATE 150 MG/ML
150 INJECTION, SUSPENSION INTRAMUSCULAR
Qty: 1 | Refills: 3 | Status: COMPLETED | COMMUNITY
Start: 2021-11-02 | End: 2022-07-19

## 2022-07-19 RX ORDER — PRENATAL VIT NO.130/IRON/FOLIC 27MG-0.8MG
28-0.8 TABLET ORAL
Qty: 30 | Refills: 11 | Status: COMPLETED | COMMUNITY
Start: 2020-03-24 | End: 2022-07-19

## 2022-07-19 NOTE — PHYSICAL EXAM
[Appropriately responsive] : appropriately responsive [Alert] : alert [No Acute Distress] : no acute distress [Soft] : soft [Non-tender] : non-tender [Non-distended] : non-distended [No Lesions] : no lesions [No Mass] : no mass [Oriented x3] : oriented x3 [FreeTextEntry7] : no rebound, no gaurding

## 2022-07-19 NOTE — HISTORY OF PRESENT ILLNESS
[Patient reported PAP Smear was normal] : Patient reported PAP Smear was normal [FreeTextEntry1] : Complaining of non-specific LUQ pain since BTL(05/12/22).  Rates pain as 8/10 in intensity, dull, constant, radiating to the back.  Patient gives hx/o hematuria, denies hx/o renal stones, notices intermittent urinary malodor.\par \par Sexually active, not using protection, POCT is negative today.\par \par Patient is breastfeeding and has not had a period since last conception. \par \par  [PapSmeardate] : 8/7/2019 [TextBox_31] : WNL

## 2022-07-20 DIAGNOSIS — R10.9 UNSPECIFIED ABDOMINAL PAIN: ICD-10-CM

## 2022-07-20 DIAGNOSIS — R31.9 HEMATURIA, UNSPECIFIED: ICD-10-CM

## 2022-07-20 DIAGNOSIS — Z78.9 OTHER SPECIFIED HEALTH STATUS: ICD-10-CM

## 2022-07-20 DIAGNOSIS — Z98.51 TUBAL LIGATION STATUS: ICD-10-CM

## 2022-07-20 DIAGNOSIS — N91.1 SECONDARY AMENORRHEA: ICD-10-CM

## 2022-07-20 DIAGNOSIS — R10.2 PELVIC AND PERINEAL PAIN: ICD-10-CM

## 2022-07-20 DIAGNOSIS — Z11.3 ENCOUNTER FOR SCREENING FOR INFECTIONS WITH A PREDOMINANTLY SEXUAL MODE OF TRANSMISSION: ICD-10-CM

## 2022-07-20 DIAGNOSIS — R39.9 UNSPECIFIED SYMPTOMS AND SIGNS INVOLVING THE GENITOURINARY SYSTEM: ICD-10-CM

## 2022-07-20 LAB
ALBUMIN SERPL ELPH-MCNC: 4.4 G/DL
ALP BLD-CCNC: 143 U/L
ALT SERPL-CCNC: 17 U/L
ANION GAP SERPL CALC-SCNC: 11 MMOL/L
AST SERPL-CCNC: 17 U/L
BASOPHILS # BLD AUTO: 0.03 K/UL
BASOPHILS NFR BLD AUTO: 0.4 %
BILIRUB SERPL-MCNC: 0.2 MG/DL
BUN SERPL-MCNC: 13 MG/DL
C TRACH RRNA SPEC QL NAA+PROBE: NOT DETECTED
CALCIUM SERPL-MCNC: 9.1 MG/DL
CHLORIDE SERPL-SCNC: 101 MMOL/L
CO2 SERPL-SCNC: 25 MMOL/L
CREAT SERPL-MCNC: 0.71 MG/DL
EGFR: 118 ML/MIN/1.73M2
EOSINOPHIL # BLD AUTO: 0.26 K/UL
EOSINOPHIL NFR BLD AUTO: 3.4 %
FSH SERPL-MCNC: 3.7 IU/L
GLUCOSE SERPL-MCNC: 81 MG/DL
HCG SERPL-MCNC: <1 MIU/ML
HCT VFR BLD CALC: 36.7 %
HGB BLD-MCNC: 11.7 G/DL
IMM GRANULOCYTES NFR BLD AUTO: 0.3 %
LYMPHOCYTES # BLD AUTO: 2.38 K/UL
LYMPHOCYTES NFR BLD AUTO: 31.4 %
MAN DIFF?: NORMAL
MCHC RBC-ENTMCNC: 27.7 PG
MCHC RBC-ENTMCNC: 31.9 GM/DL
MCV RBC AUTO: 87 FL
MONOCYTES # BLD AUTO: 0.39 K/UL
MONOCYTES NFR BLD AUTO: 5.1 %
N GONORRHOEA RRNA SPEC QL NAA+PROBE: NOT DETECTED
NEUTROPHILS # BLD AUTO: 4.51 K/UL
NEUTROPHILS NFR BLD AUTO: 59.4 %
PLATELET # BLD AUTO: 318 K/UL
POTASSIUM SERPL-SCNC: 4.1 MMOL/L
PROLACTIN SERPL-MCNC: 27.6 NG/ML
PROT SERPL-MCNC: 7.3 G/DL
RBC # BLD: 4.22 M/UL
RBC # FLD: 13.1 %
SODIUM SERPL-SCNC: 138 MMOL/L
SOURCE AMPLIFICATION: NORMAL
T4 FREE SERPL-MCNC: 1.5 NG/DL
TSH SERPL-ACNC: 9.82 UIU/ML
WBC # FLD AUTO: 7.59 K/UL

## 2022-07-21 ENCOUNTER — NON-APPOINTMENT (OUTPATIENT)
Age: 27
End: 2022-07-21

## 2022-07-27 LAB — BACTERIA UR CULT: ABNORMAL

## 2022-08-23 ENCOUNTER — EMERGENCY (EMERGENCY)
Facility: HOSPITAL | Age: 27
LOS: 1 days | Discharge: HOME | End: 2022-08-23
Attending: EMERGENCY MEDICINE
Payer: MEDICAID

## 2022-08-23 VITALS
HEIGHT: 60 IN | TEMPERATURE: 98 F | OXYGEN SATURATION: 99 % | SYSTOLIC BLOOD PRESSURE: 104 MMHG | DIASTOLIC BLOOD PRESSURE: 70 MMHG | WEIGHT: 156.09 LBS | RESPIRATION RATE: 17 BRPM | HEART RATE: 84 BPM

## 2022-08-23 DIAGNOSIS — Z98.890 OTHER SPECIFIED POSTPROCEDURAL STATES: Chronic | ICD-10-CM

## 2022-08-23 LAB
ALBUMIN SERPL ELPH-MCNC: 3.7 G/DL — SIGNIFICANT CHANGE UP (ref 3.5–5)
ALP SERPL-CCNC: 138 U/L — HIGH (ref 40–120)
ALT FLD-CCNC: 20 U/L DA — SIGNIFICANT CHANGE UP (ref 10–60)
ANION GAP SERPL CALC-SCNC: 7 MMOL/L — SIGNIFICANT CHANGE UP (ref 5–17)
APPEARANCE UR: CLEAR — SIGNIFICANT CHANGE UP
AST SERPL-CCNC: 20 U/L — SIGNIFICANT CHANGE UP (ref 10–40)
BACTERIA # UR AUTO: ABNORMAL /HPF
BASOPHILS # BLD AUTO: 0.03 K/UL — SIGNIFICANT CHANGE UP (ref 0–0.2)
BASOPHILS NFR BLD AUTO: 0.2 % — SIGNIFICANT CHANGE UP (ref 0–2)
BILIRUB SERPL-MCNC: 0.3 MG/DL — SIGNIFICANT CHANGE UP (ref 0.2–1.2)
BILIRUB UR-MCNC: NEGATIVE — SIGNIFICANT CHANGE UP
BUN SERPL-MCNC: 13 MG/DL — SIGNIFICANT CHANGE UP (ref 7–18)
CALCIUM SERPL-MCNC: 8.9 MG/DL — SIGNIFICANT CHANGE UP (ref 8.4–10.5)
CHLORIDE SERPL-SCNC: 106 MMOL/L — SIGNIFICANT CHANGE UP (ref 96–108)
CO2 SERPL-SCNC: 27 MMOL/L — SIGNIFICANT CHANGE UP (ref 22–31)
COLOR SPEC: YELLOW — SIGNIFICANT CHANGE UP
CREAT SERPL-MCNC: 0.81 MG/DL — SIGNIFICANT CHANGE UP (ref 0.5–1.3)
DIFF PNL FLD: ABNORMAL
EGFR: 101 ML/MIN/1.73M2 — SIGNIFICANT CHANGE UP
EOSINOPHIL # BLD AUTO: 0.1 K/UL — SIGNIFICANT CHANGE UP (ref 0–0.5)
EOSINOPHIL NFR BLD AUTO: 0.7 % — SIGNIFICANT CHANGE UP (ref 0–6)
EPI CELLS # UR: SIGNIFICANT CHANGE UP /HPF
GLUCOSE SERPL-MCNC: 78 MG/DL — SIGNIFICANT CHANGE UP (ref 70–99)
GLUCOSE UR QL: NEGATIVE — SIGNIFICANT CHANGE UP
HCG UR QL: NEGATIVE — SIGNIFICANT CHANGE UP
HCT VFR BLD CALC: 37.7 % — SIGNIFICANT CHANGE UP (ref 34.5–45)
HGB BLD-MCNC: 12.4 G/DL — SIGNIFICANT CHANGE UP (ref 11.5–15.5)
IMM GRANULOCYTES NFR BLD AUTO: 0.4 % — SIGNIFICANT CHANGE UP (ref 0–1.5)
KETONES UR-MCNC: NEGATIVE — SIGNIFICANT CHANGE UP
LEUKOCYTE ESTERASE UR-ACNC: ABNORMAL
LIDOCAIN IGE QN: 122 U/L — SIGNIFICANT CHANGE UP (ref 73–393)
LYMPHOCYTES # BLD AUTO: 1.6 K/UL — SIGNIFICANT CHANGE UP (ref 1–3.3)
LYMPHOCYTES # BLD AUTO: 11.5 % — LOW (ref 13–44)
MCHC RBC-ENTMCNC: 28.3 PG — SIGNIFICANT CHANGE UP (ref 27–34)
MCHC RBC-ENTMCNC: 32.9 GM/DL — SIGNIFICANT CHANGE UP (ref 32–36)
MCV RBC AUTO: 86.1 FL — SIGNIFICANT CHANGE UP (ref 80–100)
MONOCYTES # BLD AUTO: 0.64 K/UL — SIGNIFICANT CHANGE UP (ref 0–0.9)
MONOCYTES NFR BLD AUTO: 4.6 % — SIGNIFICANT CHANGE UP (ref 2–14)
NEUTROPHILS # BLD AUTO: 11.51 K/UL — HIGH (ref 1.8–7.4)
NEUTROPHILS NFR BLD AUTO: 82.6 % — HIGH (ref 43–77)
NITRITE UR-MCNC: NEGATIVE — SIGNIFICANT CHANGE UP
NRBC # BLD: 0 /100 WBCS — SIGNIFICANT CHANGE UP (ref 0–0)
PH UR: 6 — SIGNIFICANT CHANGE UP (ref 5–8)
PLATELET # BLD AUTO: 308 K/UL — SIGNIFICANT CHANGE UP (ref 150–400)
POTASSIUM SERPL-MCNC: 4.2 MMOL/L — SIGNIFICANT CHANGE UP (ref 3.5–5.3)
POTASSIUM SERPL-SCNC: 4.2 MMOL/L — SIGNIFICANT CHANGE UP (ref 3.5–5.3)
PROT SERPL-MCNC: 8.2 G/DL — SIGNIFICANT CHANGE UP (ref 6–8.3)
PROT UR-MCNC: 30 MG/DL
RBC # BLD: 4.38 M/UL — SIGNIFICANT CHANGE UP (ref 3.8–5.2)
RBC # FLD: 12.3 % — SIGNIFICANT CHANGE UP (ref 10.3–14.5)
RBC CASTS # UR COMP ASSIST: ABNORMAL /HPF (ref 0–2)
SODIUM SERPL-SCNC: 140 MMOL/L — SIGNIFICANT CHANGE UP (ref 135–145)
SP GR SPEC: 1.01 — SIGNIFICANT CHANGE UP (ref 1.01–1.02)
TROPONIN I, HIGH SENSITIVITY RESULT: 3.3 NG/L — SIGNIFICANT CHANGE UP
UROBILINOGEN FLD QL: 1
WBC # BLD: 13.94 K/UL — HIGH (ref 3.8–10.5)
WBC # FLD AUTO: 13.94 K/UL — HIGH (ref 3.8–10.5)
WBC UR QL: SIGNIFICANT CHANGE UP /HPF (ref 0–5)

## 2022-08-23 PROCEDURE — 83690 ASSAY OF LIPASE: CPT

## 2022-08-23 PROCEDURE — 76856 US EXAM PELVIC COMPLETE: CPT | Mod: 26,59

## 2022-08-23 PROCEDURE — 99284 EMERGENCY DEPT VISIT MOD MDM: CPT

## 2022-08-23 PROCEDURE — 93005 ELECTROCARDIOGRAM TRACING: CPT

## 2022-08-23 PROCEDURE — 99285 EMERGENCY DEPT VISIT HI MDM: CPT | Mod: 25

## 2022-08-23 PROCEDURE — 84484 ASSAY OF TROPONIN QUANT: CPT

## 2022-08-23 PROCEDURE — 80053 COMPREHEN METABOLIC PANEL: CPT

## 2022-08-23 PROCEDURE — 85025 COMPLETE CBC W/AUTO DIFF WBC: CPT

## 2022-08-23 PROCEDURE — 96374 THER/PROPH/DIAG INJ IV PUSH: CPT

## 2022-08-23 PROCEDURE — 76856 US EXAM PELVIC COMPLETE: CPT

## 2022-08-23 PROCEDURE — 81025 URINE PREGNANCY TEST: CPT

## 2022-08-23 PROCEDURE — 76830 TRANSVAGINAL US NON-OB: CPT

## 2022-08-23 PROCEDURE — 81001 URINALYSIS AUTO W/SCOPE: CPT

## 2022-08-23 PROCEDURE — 76830 TRANSVAGINAL US NON-OB: CPT | Mod: 26

## 2022-08-23 PROCEDURE — 36415 COLL VENOUS BLD VENIPUNCTURE: CPT

## 2022-08-23 RX ORDER — KETOROLAC TROMETHAMINE 30 MG/ML
30 SYRINGE (ML) INJECTION ONCE
Refills: 0 | Status: DISCONTINUED | OUTPATIENT
Start: 2022-08-23 | End: 2022-08-23

## 2022-08-23 RX ORDER — SODIUM CHLORIDE 9 MG/ML
3 INJECTION INTRAMUSCULAR; INTRAVENOUS; SUBCUTANEOUS ONCE
Refills: 0 | Status: COMPLETED | OUTPATIENT
Start: 2022-08-23 | End: 2022-08-23

## 2022-08-23 RX ORDER — IBUPROFEN 200 MG
1 TABLET ORAL
Qty: 28 | Refills: 0
Start: 2022-08-23 | End: 2022-08-29

## 2022-08-23 RX ADMIN — Medication 30 MILLIGRAM(S): at 13:29

## 2022-08-23 RX ADMIN — SODIUM CHLORIDE 3 MILLILITER(S): 9 INJECTION INTRAMUSCULAR; INTRAVENOUS; SUBCUTANEOUS at 13:27

## 2022-08-23 NOTE — ED PROVIDER NOTE - NSFOLLOWUPINSTRUCTIONS_ED_ALL_ED_FT
Log Out.      [employer]® CareNotes®     :  [facility]  	                     PELVIC PAIN IN WOMEN - General Information           Pelvic Pain in Women    WHAT YOU NEED TO KNOW:    What do I need to know about pelvic pain? You may have pain on one or both sides of your pelvis. Pelvic pain may occur with certain body positions or activities, such as when you have sex or a bowel movement. It may worsen during your monthly period or after you sit or stand for a long time. Chronic pelvic pain is pain that continues for longer than 6 months.    What causes pelvic pain in women?   •Gynecologic conditions, such as pelvic inflammatory disease (PID), endometriosis, or uterine fibroids      •Bowel and bladder conditions, such as irritable bowel syndrome, bladder inflammation, or tumors       •Muscle and nerve conditions, such as swelling or weakness of your pelvic muscles, or damage to the nerves of your pelvic area (neuropathy)      •Psychological issues as a result of physical or sexual abuse, or drug abuse      How is pelvic pain treated?   •Pain medicine may be given in pills, injections, or creams to relieve your pain.       •Hormones may be given if your pain gets worse with your menstrual cycle.      •Antibiotics may be given if your pain is caused by infection.      •Surgery may be done if other treatments do not relieve your pain.       How can I manage my pelvic pain?   •Keep a pain diary. Write down when your pain happens, how severe it is, and any other symptoms you have with your pain. A diary will help you keep track of pain cycles. It may also help your healthcare provider find out what is causing your pain.      •Learn ways to relax. Deep breathing, meditation, and relaxation techniques can help decrease your pain. When you are tense, your pain may increase.      •Change the foods you eat if you have irritable bowel syndrome. Ask your healthcare provider about the best foods for you.       Call 911 for any of the following:   •You have severe chest pain and sudden trouble breathing.          When should I seek immediate care?   •You have heavy or unusual vaginal bleeding, and you feel lightheaded or faint.          When should I contact my healthcare provider?   •You have pelvic pain that does not go away after you take pain medicine.      •You develop new symptoms or your symptoms are worse than before.      •You have questions or concerns about your condition or care.      CARE AGREEMENT:    You have the right to help plan your care. Learn about your health condition and how it may be treated. Discuss treatment options with your healthcare providers to decide what care you want to receive. You always have the right to refuse treatment.        © Copyright [employer] 2022           back to top                          © Copyright [employer] 2022

## 2022-08-23 NOTE — ED PROVIDER NOTE - CLINICAL SUMMARY MEDICAL DECISION MAKING FREE TEXT BOX
Amenorrhea and pelvic pain. Will obtain pelvic ultrasound, labs, urinalysis, and pregnancy test. Will give Toradol.

## 2022-08-23 NOTE — ED PROVIDER NOTE - OBJECTIVE STATEMENT
27 year old female with hypothyroidism presents to ED complaining of bilateral pelvic pain which developed today, l > r. Patient reports that she has not gotten her period for at least a few weeks to a few months. She describes the pain as radiating to her lower back. Denies fever, nausea, vomiting, diarrhea, frequency, urgency, or dysuria. NKDA.

## 2022-08-23 NOTE — ED PROVIDER NOTE - PROGRESS NOTE DETAILS
patient is stable unclear why patient not getting her period will refer to gyn had episode of chest pain ekg shows no acute change troponin is negative

## 2022-08-23 NOTE — ED PROVIDER NOTE - PATIENT PORTAL LINK FT
You can access the FollowMyHealth Patient Portal offered by [facility] by registering at the following website: [email or web address]. By joining [facility]’s FollowMyHealth portal, you will also be able to view your health information using other applications (apps) compatible with our system.

## 2022-08-23 NOTE — ED PROVIDER NOTE - PRINCIPAL DIAGNOSIS
Mother calling reporting patient has a fever of 98.8 F axillary. States patient was given immunization today. Patient was sleepier than usual today. Per guideline, advised patient to be seen at the emergency department. Father states they will monitor patient and will give tylenol for the fever.     Kristofer Baum RN  [facility]       Reason for Disposition    Axillary fever  99 F (37.2 C) or higher (preference: take rectal temp)    Additional Information    Negative: Shock suspected (very weak, limp, not moving, pale cool skin, etc)    Negative: Unconscious (can't be awakened)    Negative: Difficult to awaken or to keep awake  (Exception: needs normal sleep)    Negative: [1] Difficulty breathing AND [2] severe (struggling for each breath, unable to speak or cry, grunting sounds, severe retractions)    Negative: Bluish lips, tongue or face    Negative: Multiple purple (or blood-colored) spots or dots on skin    Negative: Sounds like a life-threatening emergency to the triager    Negative: Age > 3 months (12 weeks or older)    Negative: [1] Fever onset within 24 hours of receiving vaccine AND [2] age 8 weeks or older    Negative: Fever 100.4 F (38.0 C) or higher by any route    Protocols used: FEVER BEFORE 3 MONTHS OLD-PEDIATRIC-      
Pain pelvic

## 2022-09-02 ENCOUNTER — EMERGENCY (EMERGENCY)
Facility: HOSPITAL | Age: 27
LOS: 1 days | Discharge: HOME | End: 2022-09-02
Attending: EMERGENCY MEDICINE
Payer: MEDICAID

## 2022-09-02 VITALS
HEIGHT: 60 IN | OXYGEN SATURATION: 98 % | RESPIRATION RATE: 20 BRPM | DIASTOLIC BLOOD PRESSURE: 76 MMHG | HEART RATE: 75 BPM | SYSTOLIC BLOOD PRESSURE: 112 MMHG | TEMPERATURE: 98 F | WEIGHT: 156.09 LBS

## 2022-09-02 DIAGNOSIS — Z98.890 OTHER SPECIFIED POSTPROCEDURAL STATES: Chronic | ICD-10-CM

## 2022-09-02 PROCEDURE — 99285 EMERGENCY DEPT VISIT HI MDM: CPT

## 2022-09-02 RX ORDER — SODIUM CHLORIDE 9 MG/ML
1000 INJECTION INTRAMUSCULAR; INTRAVENOUS; SUBCUTANEOUS ONCE
Refills: 0 | Status: COMPLETED | OUTPATIENT
Start: 2022-09-02 | End: 2022-09-02

## 2022-09-02 RX ORDER — KETOROLAC TROMETHAMINE 30 MG/ML
30 SYRINGE (ML) INJECTION ONCE
Refills: 0 | Status: DISCONTINUED | OUTPATIENT
Start: 2022-09-02 | End: 2022-09-02

## 2022-09-02 RX ORDER — ONDANSETRON 8 MG/1
4 TABLET, FILM COATED ORAL ONCE
Refills: 0 | Status: COMPLETED | OUTPATIENT
Start: 2022-09-02 | End: 2022-09-02

## 2022-09-02 RX ADMIN — SODIUM CHLORIDE 1000 MILLILITER(S): 9 INJECTION INTRAMUSCULAR; INTRAVENOUS; SUBCUTANEOUS at 23:54

## 2022-09-02 RX ADMIN — Medication 30 MILLIGRAM(S): at 23:53

## 2022-09-02 RX ADMIN — ONDANSETRON 4 MILLIGRAM(S): 8 TABLET, FILM COATED ORAL at 23:53

## 2022-09-02 NOTE — ED ADULT TRIAGE NOTE - CHIEF COMPLAINT QUOTE
LLQ pain w nausea and diarrhea since Tuesday w blood in the stool, and lower back pain, no dysuria/ fever

## 2022-09-03 VITALS
HEART RATE: 75 BPM | OXYGEN SATURATION: 99 % | TEMPERATURE: 98 F | RESPIRATION RATE: 18 BRPM | DIASTOLIC BLOOD PRESSURE: 70 MMHG | SYSTOLIC BLOOD PRESSURE: 116 MMHG

## 2022-09-03 LAB
ALBUMIN SERPL ELPH-MCNC: 4 G/DL — SIGNIFICANT CHANGE UP (ref 3.5–5)
ALP SERPL-CCNC: 143 U/L — HIGH (ref 40–120)
ALT FLD-CCNC: 20 U/L DA — SIGNIFICANT CHANGE UP (ref 10–60)
ANION GAP SERPL CALC-SCNC: 5 MMOL/L — SIGNIFICANT CHANGE UP (ref 5–17)
APPEARANCE UR: CLEAR — SIGNIFICANT CHANGE UP
AST SERPL-CCNC: 15 U/L — SIGNIFICANT CHANGE UP (ref 10–40)
BASOPHILS # BLD AUTO: 0.03 K/UL — SIGNIFICANT CHANGE UP (ref 0–0.2)
BASOPHILS NFR BLD AUTO: 0.4 % — SIGNIFICANT CHANGE UP (ref 0–2)
BILIRUB SERPL-MCNC: 0.1 MG/DL — LOW (ref 0.2–1.2)
BILIRUB UR-MCNC: NEGATIVE — SIGNIFICANT CHANGE UP
BUN SERPL-MCNC: 19 MG/DL — HIGH (ref 7–18)
CALCIUM SERPL-MCNC: 10.1 MG/DL — SIGNIFICANT CHANGE UP (ref 8.4–10.5)
CHLORIDE SERPL-SCNC: 107 MMOL/L — SIGNIFICANT CHANGE UP (ref 96–108)
CO2 SERPL-SCNC: 28 MMOL/L — SIGNIFICANT CHANGE UP (ref 22–31)
COLOR SPEC: YELLOW — SIGNIFICANT CHANGE UP
CREAT SERPL-MCNC: 0.9 MG/DL — SIGNIFICANT CHANGE UP (ref 0.5–1.3)
DIFF PNL FLD: ABNORMAL
EGFR: 89 ML/MIN/1.73M2 — SIGNIFICANT CHANGE UP
EOSINOPHIL # BLD AUTO: 0.29 K/UL — SIGNIFICANT CHANGE UP (ref 0–0.5)
EOSINOPHIL NFR BLD AUTO: 3.9 % — SIGNIFICANT CHANGE UP (ref 0–6)
GLUCOSE SERPL-MCNC: 89 MG/DL — SIGNIFICANT CHANGE UP (ref 70–99)
GLUCOSE UR QL: NEGATIVE — SIGNIFICANT CHANGE UP
HCG SERPL-ACNC: <1 MIU/ML — SIGNIFICANT CHANGE UP
HCG UR QL: NEGATIVE — SIGNIFICANT CHANGE UP
HCT VFR BLD CALC: 37.6 % — SIGNIFICANT CHANGE UP (ref 34.5–45)
HGB BLD-MCNC: 12.3 G/DL — SIGNIFICANT CHANGE UP (ref 11.5–15.5)
IMM GRANULOCYTES NFR BLD AUTO: 0.3 % — SIGNIFICANT CHANGE UP (ref 0–1.5)
KETONES UR-MCNC: NEGATIVE — SIGNIFICANT CHANGE UP
LEUKOCYTE ESTERASE UR-ACNC: NEGATIVE — SIGNIFICANT CHANGE UP
LIDOCAIN IGE QN: 126 U/L — SIGNIFICANT CHANGE UP (ref 73–393)
LYMPHOCYTES # BLD AUTO: 2.84 K/UL — SIGNIFICANT CHANGE UP (ref 1–3.3)
LYMPHOCYTES # BLD AUTO: 38.1 % — SIGNIFICANT CHANGE UP (ref 13–44)
MCHC RBC-ENTMCNC: 28.5 PG — SIGNIFICANT CHANGE UP (ref 27–34)
MCHC RBC-ENTMCNC: 32.7 GM/DL — SIGNIFICANT CHANGE UP (ref 32–36)
MCV RBC AUTO: 87 FL — SIGNIFICANT CHANGE UP (ref 80–100)
MONOCYTES # BLD AUTO: 0.44 K/UL — SIGNIFICANT CHANGE UP (ref 0–0.9)
MONOCYTES NFR BLD AUTO: 5.9 % — SIGNIFICANT CHANGE UP (ref 2–14)
NEUTROPHILS # BLD AUTO: 3.84 K/UL — SIGNIFICANT CHANGE UP (ref 1.8–7.4)
NEUTROPHILS NFR BLD AUTO: 51.4 % — SIGNIFICANT CHANGE UP (ref 43–77)
NITRITE UR-MCNC: NEGATIVE — SIGNIFICANT CHANGE UP
NRBC # BLD: 0 /100 WBCS — SIGNIFICANT CHANGE UP (ref 0–0)
PH UR: 5 — SIGNIFICANT CHANGE UP (ref 5–8)
PLATELET # BLD AUTO: 314 K/UL — SIGNIFICANT CHANGE UP (ref 150–400)
POTASSIUM SERPL-MCNC: 4 MMOL/L — SIGNIFICANT CHANGE UP (ref 3.5–5.3)
POTASSIUM SERPL-SCNC: 4 MMOL/L — SIGNIFICANT CHANGE UP (ref 3.5–5.3)
PROT SERPL-MCNC: 8.2 G/DL — SIGNIFICANT CHANGE UP (ref 6–8.3)
PROT UR-MCNC: NEGATIVE — SIGNIFICANT CHANGE UP
RBC # BLD: 4.32 M/UL — SIGNIFICANT CHANGE UP (ref 3.8–5.2)
RBC # FLD: 12.2 % — SIGNIFICANT CHANGE UP (ref 10.3–14.5)
SODIUM SERPL-SCNC: 140 MMOL/L — SIGNIFICANT CHANGE UP (ref 135–145)
SP GR SPEC: 1.02 — SIGNIFICANT CHANGE UP (ref 1.01–1.02)
UROBILINOGEN FLD QL: NEGATIVE — SIGNIFICANT CHANGE UP
WBC # BLD: 7.46 K/UL — SIGNIFICANT CHANGE UP (ref 3.8–10.5)
WBC # FLD AUTO: 7.46 K/UL — SIGNIFICANT CHANGE UP (ref 3.8–10.5)

## 2022-09-03 PROCEDURE — 36415 COLL VENOUS BLD VENIPUNCTURE: CPT

## 2022-09-03 PROCEDURE — 87086 URINE CULTURE/COLONY COUNT: CPT

## 2022-09-03 PROCEDURE — 99284 EMERGENCY DEPT VISIT MOD MDM: CPT | Mod: 25

## 2022-09-03 PROCEDURE — 87186 SC STD MICRODIL/AGAR DIL: CPT

## 2022-09-03 PROCEDURE — 74177 CT ABD & PELVIS W/CONTRAST: CPT | Mod: MA

## 2022-09-03 PROCEDURE — 96374 THER/PROPH/DIAG INJ IV PUSH: CPT

## 2022-09-03 PROCEDURE — 80053 COMPREHEN METABOLIC PANEL: CPT

## 2022-09-03 PROCEDURE — 81025 URINE PREGNANCY TEST: CPT

## 2022-09-03 PROCEDURE — 83690 ASSAY OF LIPASE: CPT

## 2022-09-03 PROCEDURE — 96375 TX/PRO/DX INJ NEW DRUG ADDON: CPT

## 2022-09-03 PROCEDURE — 84702 CHORIONIC GONADOTROPIN TEST: CPT

## 2022-09-03 PROCEDURE — 74177 CT ABD & PELVIS W/CONTRAST: CPT | Mod: 26,MA

## 2022-09-03 PROCEDURE — 81001 URINALYSIS AUTO W/SCOPE: CPT

## 2022-09-03 PROCEDURE — 85025 COMPLETE CBC W/AUTO DIFF WBC: CPT

## 2022-09-03 NOTE — ED PROVIDER NOTE - OBJECTIVE STATEMENT
27 year old female denies PMH coming in with LLQ abd pain and diarrhea. pt states she has been having intermittent diarrhea over the past 1-2 weeks. states earlier this week she did have some solid stools and there ws some blood mixed in with the stool. states tonight with multiple episodes of diarrhea although no blood mixed in. states she was seen in the ED recently and had an US which showed a possible dermoid on left ovary but otherwise unremarkable. states some nausea but no vomiting. denies all other complaints.

## 2022-09-03 NOTE — ED PROVIDER NOTE - PROGRESS NOTE DETAILS
labs and ua are unremarkable. pending ct abdo/pelvis. ct abdo/pelvis is unremarkable. will dc. f/u with GI. return precautions discussed.

## 2022-09-03 NOTE — ED PROVIDER NOTE - PATIENT PORTAL LINK FT
You can access the FollowMyHealth Patient Portal offered by [facility] by registering at the following website: [email or web address]. By joining [facility]’s FollowMyHealth portal, you will also be able to view your health information using other applications (apps) compatible with our system.

## 2022-09-03 NOTE — ED ADULT NURSE NOTE - NS ED NURSE RECORD ANOTHER HT AND WT
Please confirm whether he took Keflex or clindamycin for his antibiotic. Also asked patient to increase his Lasix to 40 mg in the morning, 20 mg at noon to try to get the swelling even lower. If he was on Keflex we will give him clindamycin that he was on clindamycin will give him Keflex.  Also he should return to the office to see me Wednesday or Thursday this week may use a same day Yes

## 2022-09-03 NOTE — ED PROVIDER NOTE - NSFOLLOWUPCLINICS_GEN_ALL_ED_FT
[facility]  Gastroenterology  [address] [place] [address]  Phone: [phone]  Fax: [phone]

## 2022-09-03 NOTE — ED PROVIDER NOTE - CHILD ABUSE FACILITY
H Propranolol Pregnancy And Lactation Text: This medication is Pregnancy Category C and it isn't known if it is safe during pregnancy. It is excreted in breast milk.

## 2022-09-03 NOTE — ED PROVIDER NOTE - CLINICAL SUMMARY MEDICAL DECISION MAKING FREE TEXT BOX
27 year old female with LLQ abd pain and diarrhea. PE as above.  labs, ua, pain control, ct abdo/pelvis, reassess

## 2022-09-08 ENCOUNTER — APPOINTMENT (OUTPATIENT)
Dept: ULTRASOUND IMAGING | Facility: CLINIC | Age: 27
End: 2022-09-08

## 2022-09-08 PROCEDURE — 76856 US EXAM PELVIC COMPLETE: CPT

## 2022-09-08 PROCEDURE — 76700 US EXAM ABDOM COMPLETE: CPT

## 2022-09-08 PROCEDURE — 76830 TRANSVAGINAL US NON-OB: CPT

## 2022-09-08 PROCEDURE — 76775 US EXAM ABDO BACK WALL LIM: CPT

## 2022-09-12 RX ORDER — CEFUROXIME AXETIL 250 MG
1 TABLET ORAL
Qty: 14 | Refills: 0
Start: 2022-09-12 | End: 2022-09-18

## 2022-09-16 ENCOUNTER — APPOINTMENT (OUTPATIENT)
Dept: OBGYN | Facility: CLINIC | Age: 27
End: 2022-09-16

## 2022-09-16 ENCOUNTER — OUTPATIENT (OUTPATIENT)
Dept: OUTPATIENT SERVICES | Facility: HOSPITAL | Age: 27
LOS: 1 days | End: 2022-09-16
Payer: MEDICAID

## 2022-09-16 VITALS
BODY MASS INDEX: 30.63 KG/M2 | DIASTOLIC BLOOD PRESSURE: 55 MMHG | WEIGHT: 156 LBS | HEART RATE: 77 BPM | OXYGEN SATURATION: 98 % | TEMPERATURE: 97.7 F | SYSTOLIC BLOOD PRESSURE: 95 MMHG | HEIGHT: 60 IN

## 2022-09-16 DIAGNOSIS — N91.1 SECONDARY AMENORRHEA: ICD-10-CM

## 2022-09-16 DIAGNOSIS — Z98.51 TUBAL LIGATION STATUS: ICD-10-CM

## 2022-09-16 DIAGNOSIS — Z00.00 ENCOUNTER FOR GENERAL ADULT MEDICAL EXAMINATION WITHOUT ABNORMAL FINDINGS: ICD-10-CM

## 2022-09-16 DIAGNOSIS — Z87.448 PERSONAL HISTORY OF OTHER DISEASES OF URINARY SYSTEM: ICD-10-CM

## 2022-09-16 DIAGNOSIS — Z87.898 PERSONAL HISTORY OF OTHER SPECIFIED CONDITIONS: ICD-10-CM

## 2022-09-16 DIAGNOSIS — Z01.419 ENCOUNTER FOR GYNECOLOGICAL EXAMINATION (GENERAL) (ROUTINE) W/OUT ABNORMAL FINDINGS: ICD-10-CM

## 2022-09-16 DIAGNOSIS — Z12.39 ENCOUNTER FOR OTHER SCREENING FOR MALIGNANT NEOPLASM OF BREAST: ICD-10-CM

## 2022-09-16 DIAGNOSIS — Z98.890 OTHER SPECIFIED POSTPROCEDURAL STATES: Chronic | ICD-10-CM

## 2022-09-16 DIAGNOSIS — R39.9 UNSPECIFIED SYMPTOMS AND SIGNS INVOLVING THE GENITOURINARY SYSTEM: ICD-10-CM

## 2022-09-16 DIAGNOSIS — Z11.3 ENCOUNTER FOR SCREENING FOR INFECTIONS WITH A PREDOMINANTLY SEXUAL MODE OF TRANSMISSION: ICD-10-CM

## 2022-09-16 DIAGNOSIS — Z71.2 PERSON CONSULTING FOR EXPLANATION OF EXAMINATION OR TEST FINDINGS: ICD-10-CM

## 2022-09-16 DIAGNOSIS — R10.2 PELVIC AND PERINEAL PAIN: ICD-10-CM

## 2022-09-16 DIAGNOSIS — Z78.9 OTHER SPECIFIED HEALTH STATUS: ICD-10-CM

## 2022-09-16 PROCEDURE — 87591 N.GONORRHOEAE DNA AMP PROB: CPT

## 2022-09-16 PROCEDURE — 87800 DETECT AGNT MULT DNA DIREC: CPT

## 2022-09-16 PROCEDURE — G0463: CPT

## 2022-09-16 PROCEDURE — 87491 CHLMYD TRACH DNA AMP PROBE: CPT

## 2022-09-16 PROCEDURE — 99214 OFFICE O/P EST MOD 30 MIN: CPT

## 2022-09-16 RX ORDER — AMOXICILLIN 500 MG/1
500 CAPSULE ORAL 3 TIMES DAILY
Qty: 21 | Refills: 1 | Status: COMPLETED | COMMUNITY
Start: 2022-07-21 | End: 2022-09-16

## 2022-09-16 NOTE — HISTORY OF PRESENT ILLNESS
[Patient reported PAP Smear was normal] : Patient reported PAP Smear was normal [PapSmeardate] : 8/2/2019 [TextBox_31] : WNL [Menarche Age: ____] : age at menarche was [unfilled] [FreeTextEntry1] : 2020-s/p  and breastfeeding with secondary amenorrhea [No] : Patient does not have concerns regarding sex [Currently Active] : currently active [Men] : men [Vaginal] : vaginal [FreeTextEntry3] : BTL

## 2022-09-16 NOTE — PHYSICAL EXAM

## 2022-09-19 DIAGNOSIS — Z11.3 ENCOUNTER FOR SCREENING FOR INFECTIONS WITH A PREDOMINANTLY SEXUAL MODE OF TRANSMISSION: ICD-10-CM

## 2022-09-19 DIAGNOSIS — N76.1 SUBACUTE AND CHRONIC VAGINITIS: ICD-10-CM

## 2022-09-19 DIAGNOSIS — N91.1 SECONDARY AMENORRHEA: ICD-10-CM

## 2022-09-19 DIAGNOSIS — Z78.9 OTHER SPECIFIED HEALTH STATUS: ICD-10-CM

## 2022-09-19 DIAGNOSIS — Z12.39 ENCOUNTER FOR OTHER SCREENING FOR MALIGNANT NEOPLASM OF BREAST: ICD-10-CM

## 2022-09-19 DIAGNOSIS — E03.9 HYPOTHYROIDISM, UNSPECIFIED: ICD-10-CM

## 2022-09-19 DIAGNOSIS — R39.9 UNSPECIFIED SYMPTOMS AND SIGNS INVOLVING THE GENITOURINARY SYSTEM: ICD-10-CM

## 2022-09-19 DIAGNOSIS — Z01.419 ENCOUNTER FOR GYNECOLOGICAL EXAMINATION (GENERAL) (ROUTINE) WITHOUT ABNORMAL FINDINGS: ICD-10-CM

## 2022-09-19 DIAGNOSIS — Z98.51 TUBAL LIGATION STATUS: ICD-10-CM

## 2022-09-19 DIAGNOSIS — Z71.2 PERSON CONSULTING FOR EXPLANATION OF EXAMINATION OR TEST FINDINGS: ICD-10-CM

## 2022-09-19 LAB
C TRACH RRNA SPEC QL NAA+PROBE: NOT DETECTED
CANDIDA VAG CYTO: NOT DETECTED
G VAGINALIS+PREV SP MTYP VAG QL MICRO: DETECTED
N GONORRHOEA RRNA SPEC QL NAA+PROBE: NOT DETECTED
SOURCE TP AMPLIFICATION: NORMAL
T VAGINALIS VAG QL WET PREP: NOT DETECTED

## 2022-09-22 LAB — CYTOLOGY CVX/VAG DOC THIN PREP: NORMAL

## 2022-11-07 ENCOUNTER — APPOINTMENT (OUTPATIENT)
Dept: GASTROENTEROLOGY | Facility: CLINIC | Age: 27
End: 2022-11-07

## 2022-11-18 ENCOUNTER — APPOINTMENT (OUTPATIENT)
Dept: GASTROENTEROLOGY | Facility: CLINIC | Age: 27
End: 2022-11-18

## 2022-11-24 ENCOUNTER — RX RENEWAL (OUTPATIENT)
Age: 27
End: 2022-11-24

## 2022-12-28 ENCOUNTER — NON-APPOINTMENT (OUTPATIENT)
Age: 27
End: 2022-12-28

## 2023-01-12 NOTE — ED PROVIDER NOTE - NSFOLLOWUPINSTRUCTIONS_ED_ALL_ED_FT
Fasting labs drawn RA/mv  [facility] [place]                                                                                                                                                                       Abdominal Pain, Adult      Pain in the abdomen (abdominal pain) can be caused by many things. Often, abdominal pain is not serious and it gets better with no treatment or by being treated at home. However, sometimes abdominal pain is serious.    Your health care provider will ask questions about your medical history and do a physical exam to try to determine the cause of your abdominal pain.      Follow these instructions at home:    Medicines     •Take over-the-counter and prescription medicines only as told by your health care provider.      • Do not take a laxative unless told by your health care provider.        General instructions      •Watch your condition for any changes.      •Drink enough fluid to keep your urine pale yellow.      •Keep all follow-up visits as told by your health care provider. This is important.        Contact a health care provider if:    •Your abdominal pain changes or gets worse.      •You are not hungry or you lose weight without trying.      •You are constipated or have diarrhea for more than 2–3 days.      •You have pain when you urinate or have a bowel movement.      •Your abdominal pain wakes you up at night.      •Your pain gets worse with meals, after eating, or with certain foods.      •You are vomiting and cannot keep anything down.      •You have a fever.      •You have blood in your urine.        Get help right away if:    •Your pain does not go away as soon as your health care provider told you to expect.      •You cannot stop vomiting.      •Your pain is only in areas of the abdomen, such as the right side or the left lower portion of the abdomen. Pain on the right side could be caused by appendicitis.      •You have bloody or black stools, or stools that look like tar.      •You have severe pain, cramping, or bloating in your abdomen.    •You have signs of dehydration, such as:  •Dark urine, very little urine, or no urine.      •Cracked lips.      •Dry mouth.      •Sunken eyes.      •Sleepiness.      •Weakness.        •You have trouble breathing or chest pain.        Summary    •Often, abdominal pain is not serious and it gets better with no treatment or by being treated at home. However, sometimes abdominal pain is serious.      •Watch your condition for any changes.      •Take over-the-counter and prescription medicines only as told by your health care provider.      •Contact a health care provider if your abdominal pain changes or gets worse.      •Get help right away if you have severe pain, cramping, or bloating in your abdomen.      This information is not intended to replace advice given to you by your health care provider. Make sure you discuss any questions you have with your health care provider.      Document Revised: 02/05/2021 Document Reviewed: 04/27/2020    Elsevier Patient Education © 2022 [employer]

## 2023-02-02 ENCOUNTER — TRANSCRIPTION ENCOUNTER (OUTPATIENT)
Age: 28
End: 2023-02-02

## 2023-02-14 ENCOUNTER — LABORATORY RESULT (OUTPATIENT)
Age: 28
End: 2023-02-14

## 2023-02-14 ENCOUNTER — APPOINTMENT (OUTPATIENT)
Dept: GASTROENTEROLOGY | Facility: CLINIC | Age: 28
End: 2023-02-14
Payer: MEDICAID

## 2023-02-14 VITALS
HEART RATE: 95 BPM | DIASTOLIC BLOOD PRESSURE: 74 MMHG | SYSTOLIC BLOOD PRESSURE: 104 MMHG | WEIGHT: 155 LBS | HEIGHT: 60 IN | TEMPERATURE: 98 F | OXYGEN SATURATION: 98 % | RESPIRATION RATE: 16 BRPM | BODY MASS INDEX: 30.43 KG/M2

## 2023-02-14 DIAGNOSIS — K92.1 MELENA: ICD-10-CM

## 2023-02-14 DIAGNOSIS — R19.7 DIARRHEA, UNSPECIFIED: ICD-10-CM

## 2023-02-14 DIAGNOSIS — R74.8 ABNORMAL LEVELS OF OTHER SERUM ENZYMES: ICD-10-CM

## 2023-02-14 DIAGNOSIS — K59.00 CONSTIPATION, UNSPECIFIED: ICD-10-CM

## 2023-02-14 PROCEDURE — 99204 OFFICE O/P NEW MOD 45 MIN: CPT

## 2023-02-14 PROCEDURE — 99214 OFFICE O/P EST MOD 30 MIN: CPT

## 2023-02-15 PROBLEM — R74.8 ELEVATED ALKALINE PHOSPHATASE LEVEL: Status: ACTIVE | Noted: 2023-02-15

## 2023-02-15 PROBLEM — K59.00 CONSTIPATION IN FEMALE: Status: ACTIVE | Noted: 2023-02-14

## 2023-02-15 PROBLEM — K92.1 BLOOD IN STOOL: Status: ACTIVE | Noted: 2023-02-14

## 2023-02-15 PROBLEM — R19.7 ACUTE DIARRHEA: Status: ACTIVE | Noted: 2023-02-14

## 2023-02-15 LAB
25(OH)D3 SERPL-MCNC: 17.7 NG/ML
ALBUMIN SERPL ELPH-MCNC: 4.9 G/DL
ALP BLD-CCNC: 134 U/L
ALT SERPL-CCNC: 11 U/L
ANION GAP SERPL CALC-SCNC: 16 MMOL/L
AST SERPL-CCNC: 16 U/L
BARLEY IGE QN: 0.12 KUA/L
BASOPHILS # BLD AUTO: 0.02 K/UL
BASOPHILS NFR BLD AUTO: 0.2 %
BILIRUB SERPL-MCNC: 0.4 MG/DL
BUN SERPL-MCNC: 16 MG/DL
CALCIUM SERPL-MCNC: 9.2 MG/DL
CHERRY IGE QN: 3.91 KUA/L
CHLORIDE SERPL-SCNC: 101 MMOL/L
CO2 SERPL-SCNC: 20 MMOL/L
COW MILK IGE QN: <0.1 KUA/L
CRAB IGE QN: <0.1 KUA/L
CREAT SERPL-MCNC: 0.79 MG/DL
CRP SERPL-MCNC: 7 MG/L
DEPRECATED BARLEY IGE RAST QL: NORMAL
DEPRECATED CHERRY IGE RAST QL: 3
DEPRECATED COW MILK IGE RAST QL: 0
DEPRECATED CRAB IGE RAST QL: 0
DEPRECATED EGG WHITE IGE RAST QL: 0
DEPRECATED OAT IGE RAST QL: NORMAL
DEPRECATED PEANUT IGE RAST QL: NORMAL
DEPRECATED RYE IGE RAST QL: NORMAL
DEPRECATED SOYBEAN IGE RAST QL: NORMAL
DEPRECATED WHEAT IGE RAST QL: NORMAL
EGFR: 104 ML/MIN/1.73M2
EGG WHITE IGE QN: <0.1 KUA/L
EOSINOPHIL # BLD AUTO: 0.41 K/UL
EOSINOPHIL NFR BLD AUTO: 4.1 %
FERRITIN SERPL-MCNC: 25 NG/ML
FOLATE SERPL-MCNC: 14.7 NG/ML
GLIADIN IGA SER QL: <5 UNITS
GLIADIN IGG SER QL: <5 UNITS
GLIADIN PEPTIDE IGA SER-ACNC: NEGATIVE
GLIADIN PEPTIDE IGG SER-ACNC: NEGATIVE
GLUCOSE SERPL-MCNC: 79 MG/DL
HCT VFR BLD CALC: 40.8 %
HGB BLD-MCNC: 12.8 G/DL
IGA SER QL IEP: 336 MG/DL
IMM GRANULOCYTES NFR BLD AUTO: 0.2 %
IRON SATN MFR SERPL: 19 %
IRON SERPL-MCNC: 89 UG/DL
LYMPHOCYTES # BLD AUTO: 1.59 K/UL
LYMPHOCYTES NFR BLD AUTO: 16 %
MAN DIFF?: NORMAL
MCHC RBC-ENTMCNC: 26.8 PG
MCHC RBC-ENTMCNC: 31.4 GM/DL
MCV RBC AUTO: 85.5 FL
MONOCYTES # BLD AUTO: 0.53 K/UL
MONOCYTES NFR BLD AUTO: 5.3 %
NEUTROPHILS # BLD AUTO: 7.35 K/UL
NEUTROPHILS NFR BLD AUTO: 74.2 %
OAT IGE QN: 0.14 KUA/L
PEANUT IGE QN: 0.22 KUA/L
PLATELET # BLD AUTO: 410 K/UL
POTASSIUM SERPL-SCNC: 3.8 MMOL/L
PROT SERPL-MCNC: 8.2 G/DL
RBC # BLD: 4.77 M/UL
RBC # FLD: 13.7 %
RYE IGE QN: 0.14 KUA/L
SODIUM SERPL-SCNC: 137 MMOL/L
SOYBEAN IGE QN: 0.13 KUA/L
TIBC SERPL-MCNC: 460 UG/DL
TOTAL IGE SMQN RAST: 184 KU/L
TSH SERPL-ACNC: 10.2 UIU/ML
TTG IGA SER IA-ACNC: <1.2 U/ML
TTG IGA SER-ACNC: NEGATIVE
TTG IGG SER IA-ACNC: 3 U/ML
TTG IGG SER IA-ACNC: NEGATIVE
UIBC SERPL-MCNC: 371 UG/DL
VIT B12 SERPL-MCNC: 646 PG/ML
WBC # FLD AUTO: 9.92 K/UL
WHEAT IGE QN: 0.19 KUA/L

## 2023-02-15 NOTE — ASSESSMENT
[FreeTextEntry1] : 27F with PMHx hypothyroidism referred by ED for evaluation of multiple GI complaints. \par \par Alternating constipation and bloody diarrhea with urgency \par - very likely over flow diarrhea however need to investigate further \par - labs/stool test\par - schedule patient for egd/cscope, r/a/i/b discussed and patient agreeable\par - currently having diarrhea so recommend miralax prep but can consider GoLYTELY if becomes constipated again prior to procedure \par - suggest MiraLAX daily once diarrhea subsides to keep self regular\par - discussed adequate hydration, diet and exercise \par \par Elevated alk phos \par - repeat CMP \par \par f/u after above

## 2023-02-15 NOTE — PHYSICAL EXAM
[Abnormal Walk] : normal gait [Normal Color / Pigmentation] : normal skin color and pigmentation [] : no rash [Normal Turgor] : normal skin turgor [Normal] : oriented to person, place, and time [Oriented To Time, Place, And Person] : oriented to person, place, and time [Normal Affect] : the affect was normal [Normal Mood] : the mood was normal [Normal Sphincter Tone] : normal sphincter tone [de-identified] : pt endorses discomfort on palpation throughout abdomen but espeically epigastric and right side  [de-identified] : hemorrhoid present, no blood noted on gloved finger

## 2023-02-15 NOTE — HISTORY OF PRESENT ILLNESS
[FreeTextEntry1] : 27F with PMHx hypothyroidism referred by ED for evaluation of multiple GI complaints. \par \par HPI- \par cycles of constipation (5 days) followed by diarrhea with extreme urgency. recent blood in stool with this episode which occurred yesterday. Since has been having watery diarrhea. does have hx of  hemorrhoid \par 2 years ago- bright red blood in the stool\par no meds including antibiotics because still currently breastfeeding \par when younger was first time ever happened (2011/2012) from coffee, diarrhea with urgency \par episodes were happening twice a year but recently every 3 months\par ED for this in September 2022 \par always little irregular \par during episodes had really bad lower back pain and right sided abdominal pain, epigastric pain\par some nausea \par nothing has helped \par probiotic tea before came here (lemon elmer) which has helped somewhat \par tried magnesium, mylanta, tums \par \par [place] stool score- varies \par Colon cancer screening- never \par \par PMHx- hypothyroidism \par PSHx- 3 vaginal births, tubal ligation \par Rx- Synthroid \par Supplements/herbs/OTC- none \par A/c or NSAIDs? none\par FHx- unsure \par Allergies- none\par ETOH- social \par Smoking- none \par Drugs- none \par Diet- no restrictions, tries to stay away from fried/greasy foods \par \par Labs/stool tests- none recent, September 2022 Alk phos elevated @ 143, cbc wnl \par Breath tests- none \par Imaging- ultrasound normal, ct abdomen and pelvis normal \par EGD- never\par Colonoscopy- never

## 2023-02-15 NOTE — REASON FOR VISIT
[Initial Evaluation] : an initial evaluation [FreeTextEntry1] : constipation, diarrhea, blood in stool

## 2023-02-16 LAB — GI PCR PANEL: NOT DETECTED

## 2023-02-17 DIAGNOSIS — E55.9 VITAMIN D DEFICIENCY, UNSPECIFIED: ICD-10-CM

## 2023-02-17 LAB — DEPRECATED O AND P PREP STL: NORMAL

## 2023-02-17 RX ORDER — UBIDECARENONE/VIT E ACET 100MG-5
25 MCG CAPSULE ORAL
Qty: 30 | Refills: 2 | Status: ACTIVE | COMMUNITY
Start: 2023-02-17 | End: 1900-01-01

## 2023-02-27 ENCOUNTER — TRANSCRIPTION ENCOUNTER (OUTPATIENT)
Age: 28
End: 2023-02-27

## 2023-02-27 LAB — CALPROTECTIN FECAL: 138 UG/G

## 2023-03-06 ENCOUNTER — RESULT REVIEW (OUTPATIENT)
Age: 28
End: 2023-03-06

## 2023-03-06 ENCOUNTER — APPOINTMENT (OUTPATIENT)
Age: 28
End: 2023-03-06
Payer: MEDICAID

## 2023-03-06 PROCEDURE — 43235 EGD DIAGNOSTIC BRUSH WASH: CPT

## 2023-03-06 PROCEDURE — 45378 DIAGNOSTIC COLONOSCOPY: CPT

## 2023-03-21 ENCOUNTER — APPOINTMENT (OUTPATIENT)
Dept: GASTROENTEROLOGY | Facility: CLINIC | Age: 28
End: 2023-03-21
Payer: MEDICAID

## 2023-03-21 ENCOUNTER — NON-APPOINTMENT (OUTPATIENT)
Age: 28
End: 2023-03-21

## 2023-03-21 PROCEDURE — 99441: CPT

## 2023-04-03 ENCOUNTER — APPOINTMENT (OUTPATIENT)
Dept: ENDOCRINOLOGY | Facility: CLINIC | Age: 28
End: 2023-04-03
Payer: MEDICAID

## 2023-04-03 VITALS
WEIGHT: 166 LBS | SYSTOLIC BLOOD PRESSURE: 121 MMHG | DIASTOLIC BLOOD PRESSURE: 75 MMHG | HEART RATE: 97 BPM | BODY MASS INDEX: 32.42 KG/M2

## 2023-04-03 PROCEDURE — 99213 OFFICE O/P EST LOW 20 MIN: CPT

## 2023-04-04 LAB
T4 FREE SERPL-MCNC: 1.1 NG/DL
TSH SERPL-ACNC: 7.61 UIU/ML

## 2023-04-04 RX ORDER — LEVOTHYROXINE SODIUM 0.1 MG/1
100 TABLET ORAL
Qty: 90 | Refills: 0 | Status: COMPLETED | COMMUNITY
Start: 2021-11-02 | End: 2023-04-04

## 2023-04-06 ENCOUNTER — APPOINTMENT (OUTPATIENT)
Dept: GASTROENTEROLOGY | Facility: CLINIC | Age: 28
End: 2023-04-06
Payer: MEDICAID

## 2023-04-06 VITALS
WEIGHT: 165 LBS | DIASTOLIC BLOOD PRESSURE: 72 MMHG | HEART RATE: 83 BPM | BODY MASS INDEX: 32.39 KG/M2 | SYSTOLIC BLOOD PRESSURE: 122 MMHG | TEMPERATURE: 97.9 F | HEIGHT: 60 IN | OXYGEN SATURATION: 98 % | RESPIRATION RATE: 16 BRPM

## 2023-04-06 DIAGNOSIS — K59.00 CONSTIPATION, UNSPECIFIED: ICD-10-CM

## 2023-04-06 PROCEDURE — 99203 OFFICE O/P NEW LOW 30 MIN: CPT

## 2023-04-06 RX ORDER — LORATADINE 5 MG
17 TABLET,CHEWABLE ORAL DAILY
Qty: 1 | Refills: 2 | Status: ACTIVE | COMMUNITY
Start: 2023-04-06 | End: 1900-01-01

## 2023-04-10 ENCOUNTER — TRANSCRIPTION ENCOUNTER (OUTPATIENT)
Age: 28
End: 2023-04-10

## 2023-04-10 NOTE — ASSESSMENT
[FreeTextEntry1] : 27F with PMHx h/o hypothyroidism, Chiari I malformation; s/p tubal ligation Dec 2021 referred by ED for evaluation of multiple GI complaints with abnormal bowel movemetns s/p normal egd cscope\par - continue to correct thyroid with endo\par - monitor and journal BMs, miralax or magnesium as needed\par - avoid nsaids, alcohol, smoking and other gut toxins\par - counseled extensively on diet, lifestyle, gutbrain axis and modulation\par \par Elevated alk phos \par - CMP consistently elevated check GGT and isoenzyme\par - US normal 9/2022 \par \par Refer to ENT for lymph nodes\par \par f/u after above

## 2023-04-10 NOTE — PHYSICAL EXAM
[Normal Sphincter Tone] : normal sphincter tone [Abnormal Walk] : normal gait [Normal Color / Pigmentation] : normal skin color and pigmentation [] : no rash [Normal Turgor] : normal skin turgor [Normal] : oriented to person, place, and time [Oriented To Time, Place, And Person] : oriented to person, place, and time [Normal Affect] : the affect was normal [Normal Mood] : the mood was normal [de-identified] : pt endorses discomfort on palpation throughout abdomen but espeically epigastric and right side  [de-identified] : hemorrhoid present, no blood noted on gloved finger

## 2023-04-10 NOTE — HISTORY OF PRESENT ILLNESS
[FreeTextEntry1] : 27F with PMHx h/o hypothyroidism, Chiari I malformation; s/p tubal ligation Dec 2021 referred by ED for evaluation of multiple GI complaints. \par s/p egd and cscope- normal \par tsh elevated \par US abd nml\par \par HPI- \par cycles of constipation (5 days) followed by diarrhea with extreme urgency. recent blood in stool with this episode which occurred yesterday. Since has been having watery diarrhea. does have hx of  hemorrhoid \par 2 years ago- bright red blood in the stool\par no meds including antibiotics because still currently breastfeeding \par when younger was first time ever happened (2011/2012) from coffee, diarrhea with urgency \par episodes were happening twice a year but recently every 3 months\par ED for this in September 2022 \par always little irregular \par during episodes had really bad lower back pain and right sided abdominal pain, epigastric pain\par some nausea \par nothing has helped \par probiotic tea before came here (lemon elmer) which has helped somewhat \par tried magnesium, mylanta, tums \par \par [place] stool score- varies \par Colon cancer screening- never \par \par PMHx- hypothyroidism \par PSHx- 3 vaginal births, tubal ligation \par Rx- Synthroid \par Supplements/herbs/OTC- none \par A/c or NSAIDs? none\par FHx- unsure \par Allergies- none\par ETOH- social \par Smoking- none \par Drugs- none \par Diet- no restrictions, tries to stay away from fried/greasy foods \par \par Labs/stool tests- none recent, September 2022 Alk phos elevated @ 143, cbc wnl \par Breath tests- none \par Imaging- ultrasound normal, ct abdomen and pelvis normal \par EGD- never\par Colonoscopy- never

## 2023-04-11 ENCOUNTER — TRANSCRIPTION ENCOUNTER (OUTPATIENT)
Age: 28
End: 2023-04-11

## 2023-04-12 ENCOUNTER — TRANSCRIPTION ENCOUNTER (OUTPATIENT)
Age: 28
End: 2023-04-12

## 2023-04-12 LAB — GGT SERPL-CCNC: 10 U/L

## 2023-04-14 LAB
ALP BLD-CCNC: 105 IU/L
ALP BONE CFR SERPL: 40 %
ALP INTEST CFR SERPL: 8 %
ALP LIVER CFR SERPL: 52 %

## 2023-04-24 ENCOUNTER — APPOINTMENT (OUTPATIENT)
Dept: OTOLARYNGOLOGY | Facility: CLINIC | Age: 28
End: 2023-04-24
Payer: MEDICAID

## 2023-04-24 VITALS
HEART RATE: 79 BPM | BODY MASS INDEX: 32.39 KG/M2 | HEIGHT: 60 IN | OXYGEN SATURATION: 95 % | TEMPERATURE: 98 F | DIASTOLIC BLOOD PRESSURE: 82 MMHG | SYSTOLIC BLOOD PRESSURE: 118 MMHG | WEIGHT: 165 LBS

## 2023-04-24 DIAGNOSIS — J30.2 OTHER SEASONAL ALLERGIC RHINITIS: ICD-10-CM

## 2023-04-24 PROCEDURE — 31575 DIAGNOSTIC LARYNGOSCOPY: CPT

## 2023-04-24 PROCEDURE — 99204 OFFICE O/P NEW MOD 45 MIN: CPT | Mod: 25

## 2023-04-24 RX ORDER — FAMOTIDINE 20 MG/1
20 TABLET, FILM COATED ORAL
Qty: 180 | Refills: 1 | Status: ACTIVE | COMMUNITY
Start: 2023-04-24 | End: 1900-01-01

## 2023-04-24 RX ORDER — IPRATROPIUM BROMIDE 42 UG/1
0.06 SPRAY NASAL
Qty: 1 | Refills: 2 | Status: ACTIVE | COMMUNITY
Start: 2023-04-24 | End: 1900-01-01

## 2023-04-24 NOTE — CONSULT LETTER
[Dear  ___] : Dear  [unfilled], [Consult Letter:] : I had the pleasure of evaluating your patient, [unfilled]. [Please see my note below.] : Please see my note below. [Consult Closing:] : Thank you very much for allowing me to participate in the care of this patient.  If you have any questions, please do not hesitate to contact me. [Sincerely,] : Sincerely, [FreeTextEntry3] : PRISCILLA Spain Jr, MD, FAAOHNS\par Otolaryngologist\par [facility]

## 2023-04-24 NOTE — HISTORY OF PRESENT ILLNESS
[de-identified] : Swelling that she feels inside her neck (points at the R thyroid cartilage area) and some itching in the area for an indeterminate period of time. Some cough & PND sx; mild heartburn. No dysphagia or unexp. wt loss. \par Hx Hashimotos & reports a negative sono (unavailable) ~ 4 yrs ago. \par Seasonal allergies that are hard to control w/ nasal steroids; frequent clear runny nose. Singulair previously worked but was then denied by insurance. She's now on Zyrtec which helps a little. Has never seen an allergist. Chronic difficulty breathing through her nose hasn't responded to meds.

## 2023-04-24 NOTE — ASSESSMENT
[FreeTextEntry1] : We discussed a possible turbinoplasty or other nasal surgery but I recommended that she see an allergist first. \par \par In case of a significant contribution to her globus by LPR, Reviewed reflux precautions and provided the patient with the corresponding educational handout.\par

## 2023-04-24 NOTE — PHYSICAL EXAM
[de-identified] : generous thyroid [de-identified] : large R>L ITs [Laryngoscopy Performed] : laryngoscopy was performed, see procedure section for findings [Normal] : no masses and lesions seen, face is symmetric

## 2023-05-05 ENCOUNTER — APPOINTMENT (OUTPATIENT)
Dept: ULTRASOUND IMAGING | Facility: CLINIC | Age: 28
End: 2023-05-05
Payer: MEDICAID

## 2023-05-05 ENCOUNTER — OUTPATIENT (OUTPATIENT)
Dept: OUTPATIENT SERVICES | Facility: HOSPITAL | Age: 28
LOS: 1 days | End: 2023-05-05

## 2023-05-05 DIAGNOSIS — Z98.890 OTHER SPECIFIED POSTPROCEDURAL STATES: Chronic | ICD-10-CM

## 2023-05-05 PROCEDURE — 76536 US EXAM OF HEAD AND NECK: CPT | Mod: 26

## 2023-06-13 ENCOUNTER — APPOINTMENT (OUTPATIENT)
Dept: ENDOCRINOLOGY | Facility: CLINIC | Age: 28
End: 2023-06-13

## 2023-07-19 ENCOUNTER — APPOINTMENT (OUTPATIENT)
Dept: ENDOCRINOLOGY | Facility: CLINIC | Age: 28
End: 2023-07-19

## 2023-07-25 ENCOUNTER — APPOINTMENT (OUTPATIENT)
Dept: ENDOCRINOLOGY | Facility: CLINIC | Age: 28
End: 2023-07-25

## 2023-07-27 ENCOUNTER — NON-APPOINTMENT (OUTPATIENT)
Age: 28
End: 2023-07-27

## 2023-07-28 ENCOUNTER — NON-APPOINTMENT (OUTPATIENT)
Age: 28
End: 2023-07-28

## 2023-08-14 NOTE — PATIENT PROFILE OB - NS PRO DEPRESSION SCREENING Y/N6
OUTPATIENT CARDIOLOGY CONSULT    Name: Solomon Dow    Age: 59 y.o. Date of Service: 8/14/2023    Reason for Consultation:   Chief Complaint   Patient presents with    Abnormal Test Results     NP consult per Pcp d/t [email or web address]        Referring Physician: Juanito Gardner DO    History of Present Illness:  64-year-old female who is referred for cardiac evaluation due to abnormal EKG. She has history of hypertension, hyperlipidemia, prediabetes and mitral valve prolapse. Patient had an ear infection and sepsis in February of this year. She works as an . Patient quit smoking this past March. She used to smoke 1 pack a day for almost 40 years. She is active. She walks with her dog. She had an episode of chest pain last week lasting few minutes. She felt an episode of chest pressure today lasting approximately an hour. The discomfort subsided spontaneously. It was rated as 1/10 intensity and did not radiate to her, neck arms or back. Patient denies dyspnea on exertion, palpitations or lower extremity edema. She feels lightheaded at times. EKG done today revealed this bradycardia at 52 bpm, left atrial enlargement, and right bundle branch block with nonspecific T wave changes. Review of Systems:  Review of Systems   Constitutional:  Negative for chills, fatigue and fever. HENT:  Negative for nosebleeds. Respiratory:  Positive for chest tightness. Negative for cough, shortness of breath and wheezing. Gastrointestinal:  Negative for abdominal pain, blood in stool, constipation, diarrhea, nausea and vomiting. Genitourinary:  Negative for dysuria and hematuria. Musculoskeletal:  Negative for back pain, joint swelling and myalgias. Aching. Neurological:  Positive for light-headedness. Negative for syncope. Psychiatric/Behavioral:  The patient is not nervous/anxious.          Past Medical History:  Past Medical History:   Diagnosis Date    Hyperlipidemia
no

## 2023-08-16 ENCOUNTER — APPOINTMENT (OUTPATIENT)
Dept: RHEUMATOLOGY | Facility: CLINIC | Age: 28
End: 2023-08-16
Payer: COMMERCIAL

## 2023-08-16 ENCOUNTER — LABORATORY RESULT (OUTPATIENT)
Age: 28
End: 2023-08-16

## 2023-08-16 VITALS
OXYGEN SATURATION: 99 % | SYSTOLIC BLOOD PRESSURE: 119 MMHG | WEIGHT: 166 LBS | BODY MASS INDEX: 32.59 KG/M2 | RESPIRATION RATE: 15 BRPM | TEMPERATURE: 97.8 F | DIASTOLIC BLOOD PRESSURE: 75 MMHG | HEART RATE: 79 BPM | HEIGHT: 60 IN

## 2023-08-16 DIAGNOSIS — R79.82 ELEVATED C-REACTIVE PROTEIN (CRP): ICD-10-CM

## 2023-08-16 PROCEDURE — 99204 OFFICE O/P NEW MOD 45 MIN: CPT

## 2023-08-16 NOTE — HISTORY OF PRESENT ILLNESS
[FreeTextEntry1] : 28 F here for initial visit  Pt concerned that her CRP was elevated in 3/23.   pt was having cramps, diarrhea, bloating, and saw GI. Symptoms were weekly.  Pt had endoscopy and colonoscopy which was negative.   No fevers, h/a, rashes, hair loss, oral ulcers, epistaxis, sinusitis,  swollen glands, dry mouth, dry eyes, CP, SOB, vision changes, abdominal pain, nausea, blood in stool or urine, focal weakness, sensory loss,  Raynaud's, joint pain, swelling, weight loss.  +cough  +SOB; pt feels she has take deeper breaths  +numbness in fingers, feet   Pt reports this week she feels her legs are heavy and her toes at night "want to crack"  OB: no hx of miscarriages

## 2023-08-16 NOTE — DATA REVIEWED
[FreeTextEntry1] : Labs reviewed from 4/23  TSH 7.61  Labs reviewed from 2/23 GI PCR negative O & P negative CRP 7 calprotectin   EGD/colonoscopy 3/23 wnl   Bx reviewed from 3/23 Surgical Pathology Report - Auth (Verified)  Specimen(s) Submitted 1  Duodenum 2  Stomach 3  Distal esophagus 4  Terminal Ileum 5  Random colon biopsy  Final Diagnosis  1.  Duodenum: -   Duodenal mucosa with well-formed villi and within normal limits  2.  Stomach: -   Gastric fundal and antral mucosa within normal limits -   Negative for H. pylori  3.  Distal esophagus: -   Esophageal squamous epithelium within normal limits  4.  Terminal ileum: -   Small intestinal mucosa with well-formed villi and within normal limits  5.  Random colon biopsy colonic mucosa within normal limits: -   No evidence of lymphocytic or collagenous colitis

## 2023-08-16 NOTE — PHYSICAL EXAM
[FreeTextEntry1] : obese [Auscultation Breath Sounds / Voice Sounds] : lungs were clear to auscultation bilaterally [Heart Sounds] : normal S1 and S2 [Heart Sounds Gallop] : no gallops [Murmurs] : no murmurs [Heart Sounds Pericardial Friction Rub] : no pericardial rub [Cervical Lymph Nodes Enlarged Posterior Bilaterally] : posterior cervical [Cervical Lymph Nodes Enlarged Anterior Bilaterally] : anterior cervical [Musculoskeletal - Swelling] : no joint swelling seen [] : no rash [Skin Lesions] : no skin lesions [Oriented To Time, Place, And Person] : oriented to person, place, and time

## 2023-08-16 NOTE — ASSESSMENT
[FreeTextEntry1] : 28 F w mild elevation of CRP =bloating, diarrhea =extremity numbness =cough, SOB =3/23 CRP 7  CRP can be mildly elevated in setting of overweight/obese status. I doubt pt has AI dz. Will send work up.  Plan -Labs w serologies, inflammatory markers RTO depending on above results

## 2023-08-22 LAB
ALBUMIN SERPL ELPH-MCNC: 4.5 G/DL
ALP BLD-CCNC: 113 U/L
ALT SERPL-CCNC: 10 U/L
ANA SER IF-ACNC: NEGATIVE
ANION GAP SERPL CALC-SCNC: 14 MMOL/L
APPEARANCE: CLEAR
AST SERPL-CCNC: 14 U/L
BACTERIA: NEGATIVE /HPF
BILIRUB SERPL-MCNC: 0.2 MG/DL
BILIRUBIN URINE: NEGATIVE
BLOOD URINE: ABNORMAL
BUN SERPL-MCNC: 12 MG/DL
C3 SERPL-MCNC: 148 MG/DL
C4 SERPL-MCNC: 35 MG/DL
CALCIUM SERPL-MCNC: 9.2 MG/DL
CAST: 0 /LPF
CHLORIDE SERPL-SCNC: 104 MMOL/L
CK SERPL-CCNC: 85 U/L
CO2 SERPL-SCNC: 22 MMOL/L
COLOR: YELLOW
CREAT SERPL-MCNC: 0.65 MG/DL
CREAT SPEC-SCNC: 168 MG/DL
CREAT/PROT UR: 0.2 RATIO
CRP SERPL-MCNC: <3 MG/L
DSDNA AB SER-ACNC: <12 IU/ML
EGFR: 121 ML/MIN/1.73M2
ENA RNP AB SER IA-ACNC: 0.2 AL
ENA SM AB SER IA-ACNC: <0.2 AL
ENA SS-A AB SER IA-ACNC: <0.2 AL
ENA SS-B AB SER IA-ACNC: <0.2 AL
EPITHELIAL CELLS: 0 /HPF
ERYTHROCYTE [SEDIMENTATION RATE] IN BLOOD BY WESTERGREN METHOD: 26 MM/HR
GLUCOSE QUALITATIVE U: 250 MG/DL
GLUCOSE SERPL-MCNC: 77 MG/DL
KETONES URINE: ABNORMAL MG/DL
LEUKOCYTE ESTERASE URINE: NEGATIVE
MICROSCOPIC-UA: NORMAL
NITRITE URINE: NEGATIVE
PH URINE: 6.5
POTASSIUM SERPL-SCNC: 4.1 MMOL/L
PROT SERPL-MCNC: 7.4 G/DL
PROT UR-MCNC: 27 MG/DL
PROTEIN URINE: 30 MG/DL
RED BLOOD CELLS URINE: 20 /HPF
SODIUM SERPL-SCNC: 140 MMOL/L
SPECIFIC GRAVITY URINE: 1.03
UROBILINOGEN URINE: 0.2 MG/DL
WHITE BLOOD CELLS URINE: 0 /HPF

## 2023-08-24 ENCOUNTER — APPOINTMENT (OUTPATIENT)
Dept: OTOLARYNGOLOGY | Facility: CLINIC | Age: 28
End: 2023-08-24
Payer: COMMERCIAL

## 2023-08-24 VITALS
WEIGHT: 160 LBS | DIASTOLIC BLOOD PRESSURE: 74 MMHG | BODY MASS INDEX: 31.41 KG/M2 | OXYGEN SATURATION: 97 % | TEMPERATURE: 97.7 F | SYSTOLIC BLOOD PRESSURE: 114 MMHG | HEIGHT: 60 IN | HEART RATE: 75 BPM

## 2023-08-24 DIAGNOSIS — J34.3 HYPERTROPHY OF NASAL TURBINATES: ICD-10-CM

## 2023-08-24 DIAGNOSIS — R12 HEARTBURN: ICD-10-CM

## 2023-08-24 DIAGNOSIS — R09.89 OTHER SPECIFIED SYMPTOMS AND SIGNS INVOLVING THE CIRCULATORY AND RESPIRATORY SYSTEMS: ICD-10-CM

## 2023-08-24 PROCEDURE — 99214 OFFICE O/P EST MOD 30 MIN: CPT

## 2023-08-24 NOTE — ASSESSMENT
[FreeTextEntry1] : I explained that the famotidine is for daily use & Reinforced behavioral modification as previously discussed. RTC 6 wks  She may also return to see GI w/ consideration of a Bravo or pH probe.   I offered turbinoplasty if desired.

## 2023-08-24 NOTE — DATA REVIEWED
[de-identified] : 5/23 thyroid: no nodules, heterog [de-identified] : EGD 3/23: ess neg, no pH testing found

## 2023-08-24 NOTE — PHYSICAL EXAM
[Laryngoscopy Performed] : laryngoscopy was performed, see procedure section for findings [Normal] : no masses and lesions seen, face is symmetric [de-identified] : generous thyroid [de-identified] : large R>L ITs

## 2023-08-24 NOTE — HISTORY OF PRESENT ILLNESS
[de-identified] : Ongoing unchanged feeling of something inside her neck (points at the R thyroid cartilage area) and some itching in the area for an indeterminate period of time. Ongoing cough & PND sx; no further heartburn using famotidine prn. No dysphagia or unexp. wt loss.  Hx Hashimotos & now follows up a sono Seasonal allergies that are hard to control w/ nasal steroids; frequent clear runny nose. Singulair previously worked but was then denied by insurance. She's now on Zyrtec which helps a little. She has now seen an allergist and had skin testing pos for outdoor & dust mites. Chronic difficulty breathing through her nose hasn't responded to nasal steroids.

## 2023-10-05 ENCOUNTER — APPOINTMENT (OUTPATIENT)
Dept: OTOLARYNGOLOGY | Facility: CLINIC | Age: 28
End: 2023-10-05

## 2023-11-09 ENCOUNTER — TRANSCRIPTION ENCOUNTER (OUTPATIENT)
Age: 28
End: 2023-11-09

## 2023-11-09 ENCOUNTER — NON-APPOINTMENT (OUTPATIENT)
Age: 28
End: 2023-11-09

## 2023-11-10 ENCOUNTER — TRANSCRIPTION ENCOUNTER (OUTPATIENT)
Age: 28
End: 2023-11-10

## 2023-11-16 NOTE — ED PROVIDER NOTE - CARDIAC, MLM
Report given to Capri KEARNEY   HR to 120's. Normal rate, regular rhythm.  Heart sounds S1, S2.  No murmurs, rubs or gallops.

## 2023-11-17 ENCOUNTER — TRANSCRIPTION ENCOUNTER (OUTPATIENT)
Age: 28
End: 2023-11-17

## 2023-11-17 ENCOUNTER — NON-APPOINTMENT (OUTPATIENT)
Age: 28
End: 2023-11-17

## 2023-11-20 ENCOUNTER — APPOINTMENT (OUTPATIENT)
Dept: NEUROLOGY | Facility: CLINIC | Age: 28
End: 2023-11-20
Payer: COMMERCIAL

## 2023-11-20 VITALS
WEIGHT: 173 LBS | BODY MASS INDEX: 33.96 KG/M2 | DIASTOLIC BLOOD PRESSURE: 73 MMHG | SYSTOLIC BLOOD PRESSURE: 107 MMHG | HEIGHT: 60 IN | HEART RATE: 85 BPM | OXYGEN SATURATION: 100 % | TEMPERATURE: 98.5 F

## 2023-11-20 DIAGNOSIS — R51.9 HEADACHE, UNSPECIFIED: ICD-10-CM

## 2023-11-20 DIAGNOSIS — G93.5 COMPRESSION OF BRAIN: ICD-10-CM

## 2023-11-20 DIAGNOSIS — M54.12 RADICULOPATHY, CERVICAL REGION: ICD-10-CM

## 2023-11-20 DIAGNOSIS — G89.29 HEADACHE, UNSPECIFIED: ICD-10-CM

## 2023-11-20 DIAGNOSIS — M54.2 CERVICALGIA: ICD-10-CM

## 2023-11-20 DIAGNOSIS — G89.29 CERVICALGIA: ICD-10-CM

## 2023-11-20 PROCEDURE — 99215 OFFICE O/P EST HI 40 MIN: CPT

## 2023-11-30 ENCOUNTER — APPOINTMENT (OUTPATIENT)
Dept: MRI IMAGING | Facility: CLINIC | Age: 28
End: 2023-11-30
Payer: COMMERCIAL

## 2023-11-30 PROCEDURE — 72141 MRI NECK SPINE W/O DYE: CPT

## 2023-11-30 PROCEDURE — 72148 MRI LUMBAR SPINE W/O DYE: CPT

## 2023-12-15 ENCOUNTER — APPOINTMENT (OUTPATIENT)
Dept: NEUROLOGY | Facility: CLINIC | Age: 28
End: 2023-12-15
Payer: COMMERCIAL

## 2023-12-15 DIAGNOSIS — R20.0 ANESTHESIA OF SKIN: ICD-10-CM

## 2023-12-15 DIAGNOSIS — R20.2 ANESTHESIA OF SKIN: ICD-10-CM

## 2023-12-15 PROCEDURE — 95885 MUSC TST DONE W/NERV TST LIM: CPT | Mod: 59

## 2023-12-15 PROCEDURE — 95912 NRV CNDJ TEST 11-12 STUDIES: CPT

## 2023-12-15 PROCEDURE — 95886 MUSC TEST DONE W/N TEST COMP: CPT

## 2023-12-18 PROBLEM — R20.0 NUMBNESS AND TINGLING: Status: ACTIVE | Noted: 2023-11-20

## 2023-12-26 ENCOUNTER — RX RENEWAL (OUTPATIENT)
Age: 28
End: 2023-12-26

## 2023-12-27 ENCOUNTER — APPOINTMENT (OUTPATIENT)
Dept: ENDOCRINOLOGY | Facility: CLINIC | Age: 28
End: 2023-12-27
Payer: COMMERCIAL

## 2023-12-27 VITALS
SYSTOLIC BLOOD PRESSURE: 106 MMHG | DIASTOLIC BLOOD PRESSURE: 73 MMHG | HEART RATE: 88 BPM | WEIGHT: 177 LBS | BODY MASS INDEX: 34.57 KG/M2

## 2023-12-27 DIAGNOSIS — E06.3 AUTOIMMUNE THYROIDITIS: ICD-10-CM

## 2023-12-27 LAB
T4 FREE SERPL-MCNC: 1.4 NG/DL
TSH SERPL-ACNC: 19.8 UIU/ML

## 2023-12-27 PROCEDURE — 99214 OFFICE O/P EST MOD 30 MIN: CPT

## 2023-12-27 RX ORDER — LEVOTHYROXINE SODIUM 0.11 MG/1
112 TABLET ORAL
Qty: 5 | Refills: 0 | Status: COMPLETED | COMMUNITY
Start: 2023-04-04 | End: 2023-12-27

## 2023-12-27 RX ORDER — METRONIDAZOLE 7.5 MG/G
0.75 GEL VAGINAL
Qty: 1 | Refills: 0 | Status: COMPLETED | COMMUNITY
Start: 2022-09-19 | End: 2023-12-27

## 2023-12-27 RX ORDER — NYSTATIN 100000 U/G
100000 OINTMENT TOPICAL
Qty: 1 | Refills: 0 | Status: COMPLETED | COMMUNITY
Start: 2022-02-16 | End: 2023-12-27

## 2024-03-08 ENCOUNTER — APPOINTMENT (OUTPATIENT)
Dept: ENDOCRINOLOGY | Facility: CLINIC | Age: 29
End: 2024-03-08

## 2024-03-13 ENCOUNTER — APPOINTMENT (OUTPATIENT)
Dept: ENDOCRINOLOGY | Facility: CLINIC | Age: 29
End: 2024-03-13
Payer: COMMERCIAL

## 2024-03-13 VITALS
DIASTOLIC BLOOD PRESSURE: 70 MMHG | WEIGHT: 173 LBS | HEART RATE: 76 BPM | BODY MASS INDEX: 33.79 KG/M2 | SYSTOLIC BLOOD PRESSURE: 103 MMHG

## 2024-03-13 DIAGNOSIS — Z00.00 ENCOUNTER FOR GENERAL ADULT MEDICAL EXAMINATION W/OUT ABNORMAL FINDINGS: ICD-10-CM

## 2024-03-13 PROCEDURE — 36415 COLL VENOUS BLD VENIPUNCTURE: CPT

## 2024-03-13 PROCEDURE — 99214 OFFICE O/P EST MOD 30 MIN: CPT

## 2024-03-13 PROCEDURE — G2211 COMPLEX E/M VISIT ADD ON: CPT

## 2024-03-14 LAB
CHOLEST SERPL-MCNC: 225 MG/DL
ESTIMATED AVERAGE GLUCOSE: 120 MG/DL
HBA1C MFR BLD HPLC: 5.8 %
HDLC SERPL-MCNC: 50 MG/DL
LDLC SERPL CALC-MCNC: 153 MG/DL
NONHDLC SERPL-MCNC: 175 MG/DL
T4 FREE SERPL-MCNC: 1.4 NG/DL
TRIGL SERPL-MCNC: 125 MG/DL
TSH SERPL-ACNC: 9.19 UIU/ML

## 2024-03-14 RX ORDER — LEVOTHYROXINE SODIUM 0.14 MG/1
137 TABLET ORAL
Qty: 90 | Refills: 1 | Status: ACTIVE | COMMUNITY
Start: 2024-03-14 | End: 1900-01-01

## 2024-03-14 RX ORDER — LEVOTHYROXINE SODIUM 0.12 MG/1
125 TABLET ORAL DAILY
Qty: 90 | Refills: 1 | Status: COMPLETED | COMMUNITY
Start: 2023-12-27 | End: 2024-03-14

## 2024-05-10 ENCOUNTER — APPOINTMENT (OUTPATIENT)
Dept: NEUROLOGY | Facility: CLINIC | Age: 29
End: 2024-05-10
Payer: COMMERCIAL

## 2024-05-10 PROCEDURE — 99446 NTRPROF PH1/NTRNET/EHR 5-10: CPT

## 2024-05-10 NOTE — HISTORY OF PRESENT ILLNESS
[FreeTextEntry1] :   This visit was provided via telehealth using real-time 2-way audio technology. The patient, Ally Hu was located at home,[address] [place] [address] , at the time of the visit.  The provider, ELIO DOW, was located at the medical office located in [address] [place] [address] at [facility] [place] the time of the visit. The patient,Ally Hu and Provider participated in the telehealth encounter.  Verbal consent for telehealth services was given on 5/10/2024 by the patient, Ally Hu.

## 2024-05-10 NOTE — DISCUSSION/SUMMARY
[FreeTextEntry1] : Explained to the patient that was reporting the result of the EMG.  The EMG was done by Dr. Gowdin Nobles.  According to the report the test is normal.  She understood the EMG test was normal but could not understand why she continues to feel pain. She reported the result of an MRI that showed a torn Achilles tendon. It is apparent that the torn Achilles tendon is the cause of the pain. I brought this to her attention and asked her to follow up

## 2024-06-05 NOTE — H&P PST ADULT - REASON FOR ADMISSION
educated patient on proper gait pattern when utilizing large based quad cane. bilateral tubal ligation

## 2024-06-19 ENCOUNTER — APPOINTMENT (OUTPATIENT)
Dept: ENDOCRINOLOGY | Facility: CLINIC | Age: 29
End: 2024-06-19
Payer: COMMERCIAL

## 2024-06-19 VITALS
WEIGHT: 174 LBS | BODY MASS INDEX: 33.98 KG/M2 | SYSTOLIC BLOOD PRESSURE: 105 MMHG | DIASTOLIC BLOOD PRESSURE: 73 MMHG | HEART RATE: 76 BPM

## 2024-06-19 DIAGNOSIS — E03.9 HYPOTHYROIDISM, UNSPECIFIED: ICD-10-CM

## 2024-06-19 DIAGNOSIS — E78.2 MIXED HYPERLIPIDEMIA: ICD-10-CM

## 2024-06-19 DIAGNOSIS — R73.03 PREDIABETES.: ICD-10-CM

## 2024-06-19 PROCEDURE — 99214 OFFICE O/P EST MOD 30 MIN: CPT

## 2024-06-19 PROCEDURE — G2211 COMPLEX E/M VISIT ADD ON: CPT | Mod: NC,1L

## 2024-06-20 LAB
ESTIMATED AVERAGE GLUCOSE: 117 MG/DL
HBA1C MFR BLD HPLC: 5.7 %
T4 FREE SERPL-MCNC: 1.1 NG/DL
TSH SERPL-ACNC: 1.42 UIU/ML

## 2024-07-19 ENCOUNTER — APPOINTMENT (OUTPATIENT)
Dept: HEART AND VASCULAR | Facility: CLINIC | Age: 29
End: 2024-07-19

## 2024-09-16 ENCOUNTER — RX RENEWAL (OUTPATIENT)
Age: 29
End: 2024-09-16

## 2024-10-14 ENCOUNTER — APPOINTMENT (OUTPATIENT)
Dept: ENDOCRINOLOGY | Facility: CLINIC | Age: 29
End: 2024-10-14

## 2024-11-15 NOTE — PATIENT PROFILE OB - VISION (WITH CORRECTIVE LENSES IF THE PATIENT USUALLY WEARS THEM):
What Type Of Note Output Would You Prefer (Optional)?: Bullet Format What Is The Reason For Today's Visit?: Full Body Skin Examination What Is The Reason For Today's Visit? (Being Monitored For X): concerning skin lesions on an annual basis How Severe Are Your Spot(S)?: moderate Normal vision: sees adequately in most situations; can see medication labels, newsprint

## 2024-11-25 ENCOUNTER — APPOINTMENT (OUTPATIENT)
Dept: UROLOGY | Facility: CLINIC | Age: 29
End: 2024-11-25

## 2024-11-25 DIAGNOSIS — R31.29 OTHER MICROSCOPIC HEMATURIA: ICD-10-CM

## 2025-02-27 NOTE — H&P PST ADULT - NSSUBSTANCEUSE_GEN_ALL_CORE_SD
How Many Mls Were Removed From The 40 Mg/Ml (10ml) Vial When Preparing The Injectable Solution?: 0 Include Z78.9 (Other Specified Conditions Influencing Health Status) As An Associated Diagnosis?: Yes Bill For Wasted Drug (Kenalog)?: no Ndc# For Kenalog Only: 4462-3789-08 Detail Level: Detailed Which Kenalog Vial Was Used?: Kenalog 10 mg/ml (5 ml vial) Kenalog Preparation: Kenalog Consent: The risks of atrophy were reviewed with the patient. Medical Necessity Clause: This procedure was medically necessary because the lesions that were treated were: Total Volume (Ccs): 0.4 Show Inventory Tab: Hide Administered By (Optional): VICKY Funez Concentration Of Kenalog Solution Injected (Mg/Ml): 4.0 Kenalog Type Of Vial: Multiple Dose caffeine

## 2025-08-13 ENCOUNTER — NON-APPOINTMENT (OUTPATIENT)
Age: 30
End: 2025-08-13

## 2025-09-08 ENCOUNTER — APPOINTMENT (OUTPATIENT)
Dept: OBGYN | Facility: CLINIC | Age: 30
End: 2025-09-08

## (undated) DEVICE — PREP BETADINE SPONGE STICKS

## (undated) DEVICE — SCOPE WARMER SEAL DISP

## (undated) DEVICE — ELCTR GROUNDING PAD ADULT COVIDIEN

## (undated) DEVICE — UTERINE MANIPULATOR COOPER SURGICAL 5MM 33CM GREEN

## (undated) DEVICE — SUT POLYSORB 4-0 27" P-12 UNDYED

## (undated) DEVICE — PACK PERI GYN

## (undated) DEVICE — PACK GENERAL LAPAROSCOPY

## (undated) DEVICE — FOR-ESU VALLEYLAB T7E14999DX: Type: DURABLE MEDICAL EQUIPMENT

## (undated) DEVICE — DRAPE HALF SHEET 40X57"

## (undated) DEVICE — FOLEY TRAY 16FR SURESTEP LTX BG

## (undated) DEVICE — SUT POLYSORB 0 30" GU-46

## (undated) DEVICE — SOL IRR POUR NS 0.9% 1500ML

## (undated) DEVICE — BLANKET WARMER FULL ADULT

## (undated) DEVICE — GLV 7.5 PROTEXIS

## (undated) DEVICE — DRAPE LIGHT HANDLE COVER BLUE

## (undated) DEVICE — SYR LUER LOK 10CC

## (undated) DEVICE — WRAP COMPRESSION CALF MED